# Patient Record
Sex: MALE | Race: WHITE | NOT HISPANIC OR LATINO | Employment: OTHER | URBAN - METROPOLITAN AREA
[De-identification: names, ages, dates, MRNs, and addresses within clinical notes are randomized per-mention and may not be internally consistent; named-entity substitution may affect disease eponyms.]

---

## 2019-11-22 ENCOUNTER — CATARACT EVALUATION (OUTPATIENT)
Dept: URBAN - METROPOLITAN AREA CLINIC 39 | Facility: CLINIC | Age: 74
End: 2019-11-22

## 2019-11-22 DIAGNOSIS — H25.811: ICD-10-CM

## 2019-11-22 DIAGNOSIS — H02.884: ICD-10-CM

## 2019-11-22 DIAGNOSIS — H43.21: ICD-10-CM

## 2019-11-22 DIAGNOSIS — H25.812: ICD-10-CM

## 2019-11-22 DIAGNOSIS — H04.123: ICD-10-CM

## 2019-11-22 DIAGNOSIS — H02.881: ICD-10-CM

## 2019-11-22 PROCEDURE — 99204 OFFICE O/P NEW MOD 45 MIN: CPT

## 2019-11-22 PROCEDURE — 92025-3 CORNEAL TOPO, REFUSED

## 2019-11-22 PROCEDURE — 92134 CPTRZ OPH DX IMG PST SGM RTA: CPT

## 2019-11-22 PROCEDURE — 92136TC INTERFEROMETRY - TECHNICAL COMPONENT

## 2019-11-22 RX ORDER — KETOROLAC TROMETHAMINE 5 MG/ML: 1 SOLUTION OPHTHALMIC

## 2019-11-22 RX ORDER — MOXIFLOXACIN HYDROCHLORIDE 5 MG/ML: 1 SOLUTION/ DROPS OPHTHALMIC

## 2019-11-22 RX ORDER — PREDNISOLONE ACETATE 10 MG/ML: 1 SUSPENSION/ DROPS OPHTHALMIC

## 2019-11-22 ASSESSMENT — KERATOMETRY
OD_AXISANGLE2_DEGREES: 11
OD_K1POWER_DIOPTERS: 45.25
OD_K2POWER_DIOPTERS: 46
OS_K1POWER_DIOPTERS: 44.25
OS_AXISANGLE2_DEGREES: 166
OS_AXISANGLE_DEGREES: 76
OS_K2POWER_DIOPTERS: 45.75
OD_AXISANGLE_DEGREES: 101

## 2019-11-22 ASSESSMENT — VISUAL ACUITY
OS_BAT: 20/400
OU_CC: 20/25-1
OU_SC: J16
OU_CC: J1
OS_SC: 20/80-1
OS_SC: J16
OS_CC: J8
OD_CC: 20/30-1
OD_BAT: 20/70
OD_SC: J16
OD_SC: 20/200-1
OU_SC: 20/80+1
OS_CC: 20/80-1
OD_CC: J2

## 2019-11-22 ASSESSMENT — TONOMETRY
OS_IOP_MMHG: 18
OD_IOP_MMHG: 18

## 2020-01-28 ENCOUNTER — SURGERY/PROCEDURE (OUTPATIENT)
Dept: URBAN - METROPOLITAN AREA CLINIC 39 | Facility: CLINIC | Age: 75
End: 2020-01-28

## 2020-01-28 ENCOUNTER — PRE-OP/H&P (OUTPATIENT)
Dept: URBAN - METROPOLITAN AREA CLINIC 39 | Facility: CLINIC | Age: 75
End: 2020-01-28

## 2020-01-28 DIAGNOSIS — H04.123: ICD-10-CM

## 2020-01-28 DIAGNOSIS — H25.811: ICD-10-CM

## 2020-01-28 DIAGNOSIS — H25.812: ICD-10-CM

## 2020-01-28 DIAGNOSIS — H02.881: ICD-10-CM

## 2020-01-28 DIAGNOSIS — H02.884: ICD-10-CM

## 2020-01-28 DIAGNOSIS — H21.81: ICD-10-CM

## 2020-01-28 DIAGNOSIS — H57.03: ICD-10-CM

## 2020-01-28 DIAGNOSIS — H43.21: ICD-10-CM

## 2020-01-28 PROCEDURE — 66982 XCAPSL CTRC RMVL CPLX WO ECP: CPT

## 2020-01-28 PROCEDURE — 99211T TECH SERVICE

## 2020-01-28 ASSESSMENT — KERATOMETRY
OD_AXISANGLE2_DEGREES: 11
OD_AXISANGLE_DEGREES: 101
OS_K2POWER_DIOPTERS: 45.75
OD_AXISANGLE2_DEGREES: 11
OS_AXISANGLE2_DEGREES: 166
OD_K1POWER_DIOPTERS: 45.25
OS_AXISANGLE2_DEGREES: 166
OD_AXISANGLE_DEGREES: 101
OD_K2POWER_DIOPTERS: 46
OS_K1POWER_DIOPTERS: 44.25
OS_AXISANGLE_DEGREES: 76
OS_K2POWER_DIOPTERS: 45.75
OS_K1POWER_DIOPTERS: 44.25
OD_K1POWER_DIOPTERS: 45.25
OS_AXISANGLE_DEGREES: 76
OD_K2POWER_DIOPTERS: 46

## 2020-01-29 ENCOUNTER — CATARACT POST-OP 1-DAY (OUTPATIENT)
Dept: URBAN - METROPOLITAN AREA CLINIC 35 | Facility: CLINIC | Age: 75
End: 2020-01-29

## 2020-01-29 DIAGNOSIS — Z96.1: ICD-10-CM

## 2020-01-29 PROCEDURE — 99024 POSTOP FOLLOW-UP VISIT: CPT

## 2020-01-29 ASSESSMENT — KERATOMETRY
OS_AXISANGLE_DEGREES: 76
OD_K1POWER_DIOPTERS: 45.25
OS_K2POWER_DIOPTERS: 45.75
OD_AXISANGLE2_DEGREES: 11
OD_K2POWER_DIOPTERS: 46
OD_AXISANGLE_DEGREES: 101
OS_AXISANGLE2_DEGREES: 166
OS_K1POWER_DIOPTERS: 44.25

## 2020-01-29 ASSESSMENT — VISUAL ACUITY
OD_SC: 20/200
OS_SC: J10
OS_SC: 20/40-2

## 2020-01-29 ASSESSMENT — TONOMETRY
OS_IOP_MMHG: 19
OD_IOP_MMHG: 18

## 2020-02-13 ENCOUNTER — POST-OP CATARACT (OUTPATIENT)
Dept: URBAN - METROPOLITAN AREA CLINIC 35 | Facility: CLINIC | Age: 75
End: 2020-02-13

## 2020-02-13 DIAGNOSIS — Z96.1: ICD-10-CM

## 2020-02-13 PROCEDURE — 99024 POSTOP FOLLOW-UP VISIT: CPT

## 2020-02-13 ASSESSMENT — VISUAL ACUITY
OD_SC: 20/100
OS_SC: 20/50

## 2020-02-13 ASSESSMENT — TONOMETRY
OS_IOP_MMHG: 12
OD_IOP_MMHG: 18

## 2024-01-01 ENCOUNTER — HOSPITAL ENCOUNTER (EMERGENCY)
Age: 79
Discharge: ANOTHER HOSPITAL | DRG: 288 | End: 2024-12-11
Attending: EMERGENCY MEDICINE
Payer: MEDICARE

## 2024-01-01 ENCOUNTER — HOSPITAL ENCOUNTER (INPATIENT)
Age: 79
LOS: 6 days | Discharge: HOSPICE | DRG: 288 | End: 2024-12-17
Attending: INTERNAL MEDICINE | Admitting: HOSPITALIST
Payer: MEDICARE

## 2024-01-01 ENCOUNTER — APPOINTMENT (OUTPATIENT)
Dept: GENERAL RADIOLOGY | Age: 79
DRG: 288 | End: 2024-01-01
Attending: INTERNAL MEDICINE
Payer: MEDICARE

## 2024-01-01 ENCOUNTER — APPOINTMENT (OUTPATIENT)
Dept: GENERAL RADIOLOGY | Age: 79
DRG: 288 | End: 2024-01-01
Payer: MEDICARE

## 2024-01-01 VITALS
WEIGHT: 156 LBS | OXYGEN SATURATION: 89 % | BODY MASS INDEX: 23.64 KG/M2 | SYSTOLIC BLOOD PRESSURE: 98 MMHG | HEART RATE: 120 BPM | HEIGHT: 68 IN | TEMPERATURE: 97.9 F | DIASTOLIC BLOOD PRESSURE: 47 MMHG | RESPIRATION RATE: 19 BRPM

## 2024-01-01 VITALS
HEIGHT: 68 IN | BODY MASS INDEX: 23.64 KG/M2 | HEART RATE: 96 BPM | WEIGHT: 156 LBS | DIASTOLIC BLOOD PRESSURE: 40 MMHG | OXYGEN SATURATION: 98 % | TEMPERATURE: 97.7 F | SYSTOLIC BLOOD PRESSURE: 132 MMHG | RESPIRATION RATE: 22 BRPM

## 2024-01-01 DIAGNOSIS — I33.0 INFECTIVE ENDOCARDITIS OF AORTIC VALVE: Primary | ICD-10-CM

## 2024-01-01 DIAGNOSIS — J18.9 PNEUMONIA OF BOTH LUNGS DUE TO INFECTIOUS ORGANISM, UNSPECIFIED PART OF LUNG: Primary | ICD-10-CM

## 2024-01-01 LAB
ABO + RH BLD: NORMAL
ALBUMIN SERPL-MCNC: 2.1 G/DL (ref 3.2–4.6)
ALBUMIN SERPL-MCNC: 2.3 G/DL (ref 3.2–4.6)
ALBUMIN SERPL-MCNC: 3.2 G/DL (ref 3.2–4.6)
ALBUMIN/GLOB SERPL: 0.5 (ref 1–1.9)
ALBUMIN/GLOB SERPL: 0.5 (ref 1–1.9)
ALBUMIN/GLOB SERPL: 0.8 (ref 1–1.9)
ALP SERPL-CCNC: 100 U/L (ref 40–129)
ALP SERPL-CCNC: 113 U/L (ref 40–129)
ALP SERPL-CCNC: 113 U/L (ref 40–129)
ALT SERPL-CCNC: 16 U/L (ref 8–55)
ALT SERPL-CCNC: 16 U/L (ref 8–55)
ALT SERPL-CCNC: 19 U/L (ref 12–65)
ANION GAP SERPL CALC-SCNC: 12 MMOL/L (ref 7–16)
ANION GAP SERPL CALC-SCNC: 13 MMOL/L (ref 7–16)
ANION GAP SERPL CALC-SCNC: 17 MMOL/L (ref 7–16)
APPEARANCE UR: ABNORMAL
APTT PPP: 32.6 SEC (ref 23.3–37.4)
ARTERIAL PATENCY WRIST A: POSITIVE
AST SERPL-CCNC: 31 U/L (ref 15–37)
AST SERPL-CCNC: 33 U/L (ref 15–37)
AST SERPL-CCNC: 36 U/L (ref 15–37)
BACTERIA SPEC CULT: NORMAL
BACTERIA URNS QL MICRO: ABNORMAL /HPF
BASE DEFICIT BLD-SCNC: 4.1 MMOL/L
BASOPHILS # BLD: 0 K/UL (ref 0–0.2)
BASOPHILS # BLD: 0.1 K/UL (ref 0–0.2)
BASOPHILS NFR BLD: 0 % (ref 0–2)
BASOPHILS NFR BLD: 1 % (ref 0–2)
BDY SITE: ABNORMAL
BILIRUB DIRECT SERPL-MCNC: <0.2 MG/DL (ref 0–0.4)
BILIRUB SERPL-MCNC: 0.2 MG/DL (ref 0–1.2)
BILIRUB SERPL-MCNC: 0.3 MG/DL (ref 0–1.2)
BILIRUB SERPL-MCNC: <0.2 MG/DL (ref 0–1.2)
BILIRUB UR QL: NEGATIVE
BLOOD GROUP ANTIBODIES SERPL: NORMAL
BUN SERPL-MCNC: 13 MG/DL (ref 8–23)
BUN SERPL-MCNC: 13 MG/DL (ref 8–23)
BUN SERPL-MCNC: 18 MG/DL (ref 8–23)
CALCIUM SERPL-MCNC: 8.4 MG/DL (ref 8.8–10.2)
CALCIUM SERPL-MCNC: 8.6 MG/DL (ref 8.8–10.2)
CALCIUM SERPL-MCNC: 8.8 MG/DL (ref 8.8–10.2)
CHLORIDE SERPL-SCNC: 103 MMOL/L (ref 98–107)
CHLORIDE SERPL-SCNC: 104 MMOL/L (ref 98–107)
CHLORIDE SERPL-SCNC: 105 MMOL/L (ref 98–107)
CO2 SERPL-SCNC: 16 MMOL/L (ref 20–29)
CO2 SERPL-SCNC: 20 MMOL/L (ref 20–29)
CO2 SERPL-SCNC: 22 MMOL/L (ref 20–29)
COLOR UR: ABNORMAL
CREAT SERPL-MCNC: 0.58 MG/DL (ref 0.8–1.3)
CREAT SERPL-MCNC: 0.64 MG/DL (ref 0.8–1.3)
CREAT SERPL-MCNC: 0.9 MG/DL (ref 0.8–1.3)
D DIMER PPP FEU-MCNC: 1.11 UG/ML(FEU)
DIFFERENTIAL METHOD BLD: ABNORMAL
DIFFERENTIAL METHOD BLD: ABNORMAL
EKG ATRIAL RATE: 115 BPM
EKG ATRIAL RATE: 94 BPM
EKG ATRIAL RATE: 95 BPM
EKG DIAGNOSIS: NORMAL
EKG P AXIS: 72 DEGREES
EKG P AXIS: 77 DEGREES
EKG P-R INTERVAL: 168 MS
EKG P-R INTERVAL: 175 MS
EKG Q-T INTERVAL: 316 MS
EKG Q-T INTERVAL: 384 MS
EKG Q-T INTERVAL: 410 MS
EKG QRS DURATION: 103 MS
EKG QRS DURATION: 111 MS
EKG QRS DURATION: 85 MS
EKG QTC CALCULATION (BAZETT): 447 MS
EKG QTC CALCULATION (BAZETT): 481 MS
EKG QTC CALCULATION (BAZETT): 519 MS
EKG R AXIS: 67 DEGREES
EKG R AXIS: 72 DEGREES
EKG R AXIS: 86 DEGREES
EKG T AXIS: 73 DEGREES
EKG T AXIS: 84 DEGREES
EKG T AXIS: 87 DEGREES
EKG VENTRICULAR RATE: 120 BPM
EKG VENTRICULAR RATE: 94 BPM
EKG VENTRICULAR RATE: 96 BPM
EOSINOPHIL # BLD: 0 K/UL (ref 0–0.8)
EOSINOPHIL # BLD: 0.3 K/UL (ref 0–0.8)
EOSINOPHIL NFR BLD: 0 % (ref 0.5–7.8)
EOSINOPHIL NFR BLD: 4 % (ref 0.5–7.8)
EPI CELLS #/AREA URNS HPF: ABNORMAL /HPF
ERYTHROCYTE [DISTWIDTH] IN BLOOD BY AUTOMATED COUNT: 20.3 % (ref 11.9–14.6)
ERYTHROCYTE [DISTWIDTH] IN BLOOD BY AUTOMATED COUNT: 20.6 % (ref 11.9–14.6)
ERYTHROCYTE [DISTWIDTH] IN BLOOD BY AUTOMATED COUNT: 20.9 % (ref 11.9–14.6)
GAS FLOW.O2 O2 DELIVERY SYS: ABNORMAL
GLOBULIN SER CALC-MCNC: 3.9 G/DL (ref 2.3–3.5)
GLOBULIN SER CALC-MCNC: 4.3 G/DL (ref 2.3–3.5)
GLOBULIN SER CALC-MCNC: 4.9 G/DL (ref 2.3–3.5)
GLUCOSE BLD STRIP.AUTO-MCNC: 180 MG/DL (ref 65–100)
GLUCOSE SERPL-MCNC: 107 MG/DL (ref 65–100)
GLUCOSE SERPL-MCNC: 139 MG/DL (ref 70–99)
GLUCOSE SERPL-MCNC: 235 MG/DL (ref 70–99)
GLUCOSE UR STRIP.AUTO-MCNC: NEGATIVE MG/DL
HCO3 BLD-SCNC: 19.5 MMOL/L (ref 22–26)
HCT VFR BLD AUTO: 29.1 % (ref 41.1–50.3)
HCT VFR BLD AUTO: 34.4 % (ref 41.1–50.3)
HCT VFR BLD AUTO: 34.7 % (ref 41.1–50.3)
HGB BLD-MCNC: 10.7 G/DL (ref 13.6–17.2)
HGB BLD-MCNC: 11 G/DL (ref 13.6–17.2)
HGB BLD-MCNC: 9.1 G/DL (ref 13.6–17.2)
HGB UR QL STRIP: ABNORMAL
IMM GRANULOCYTES # BLD AUTO: 0.1 K/UL (ref 0–0.5)
IMM GRANULOCYTES # BLD AUTO: 0.2 K/UL (ref 0–0.5)
IMM GRANULOCYTES NFR BLD AUTO: 1 % (ref 0–5)
IMM GRANULOCYTES NFR BLD AUTO: 1 % (ref 0–5)
INR PPP: 1.1
KETONES UR QL STRIP.AUTO: 15 MG/DL
LACTATE SERPL-SCNC: 1.6 MMOL/L (ref 0.5–2)
LACTATE SERPL-SCNC: 3.8 MMOL/L (ref 0.5–2)
LEUKOCYTE ESTERASE UR QL STRIP.AUTO: NEGATIVE
LYMPHOCYTES # BLD: 1.4 K/UL (ref 0.5–4.6)
LYMPHOCYTES # BLD: 2.1 K/UL (ref 0.5–4.6)
LYMPHOCYTES NFR BLD: 11 % (ref 13–44)
LYMPHOCYTES NFR BLD: 14 % (ref 13–44)
MAGNESIUM SERPL-MCNC: 1.9 MG/DL (ref 1.8–2.4)
MCH RBC QN AUTO: 27.6 PG (ref 26.1–32.9)
MCH RBC QN AUTO: 27.7 PG (ref 26.1–32.9)
MCH RBC QN AUTO: 28.5 PG (ref 26.1–32.9)
MCHC RBC AUTO-ENTMCNC: 30.8 G/DL (ref 31.4–35)
MCHC RBC AUTO-ENTMCNC: 31.3 G/DL (ref 31.4–35)
MCHC RBC AUTO-ENTMCNC: 32 G/DL (ref 31.4–35)
MCV RBC AUTO: 88.7 FL (ref 82–102)
MCV RBC AUTO: 89.1 FL (ref 82–102)
MCV RBC AUTO: 89.4 FL (ref 82–102)
MONOCYTES # BLD: 0.7 K/UL (ref 0.1–1.3)
MONOCYTES # BLD: 0.7 K/UL (ref 0.1–1.3)
MONOCYTES NFR BLD: 4 % (ref 4–12)
MONOCYTES NFR BLD: 7 % (ref 4–12)
MUCOUS THREADS URNS QL MICRO: 0 /LPF
NEUTS SEG # BLD: 16.3 K/UL (ref 1.7–8.2)
NEUTS SEG # BLD: 6.9 K/UL (ref 1.7–8.2)
NEUTS SEG NFR BLD: 73 % (ref 43–78)
NEUTS SEG NFR BLD: 84 % (ref 43–78)
NITRITE UR QL STRIP.AUTO: NEGATIVE
NRBC # BLD: 0 K/UL (ref 0–0.2)
NT PRO BNP: 6388 PG/ML (ref 0–450)
NT PRO BNP: 6875 PG/ML (ref 0–450)
OTHER OBSERVATIONS: ABNORMAL
PCO2 BLD: 30.5 MMHG (ref 35–45)
PH BLD: 7.41 (ref 7.35–7.45)
PH UR STRIP: 5.5 (ref 5–9)
PLATELET # BLD AUTO: 350 K/UL (ref 150–450)
PLATELET # BLD AUTO: 388 K/UL (ref 150–450)
PLATELET # BLD AUTO: 570 K/UL (ref 150–450)
PMV BLD AUTO: 7.9 FL (ref 9.4–12.3)
PMV BLD AUTO: 8.2 FL (ref 9.4–12.3)
PMV BLD AUTO: 8.3 FL (ref 9.4–12.3)
PO2 BLD: 57 MMHG (ref 75–100)
POC FIO2: 6
POTASSIUM SERPL-SCNC: 3.4 MMOL/L (ref 3.5–5.1)
POTASSIUM SERPL-SCNC: 3.7 MMOL/L (ref 3.5–5.1)
POTASSIUM SERPL-SCNC: 4.2 MMOL/L (ref 3.5–5.1)
PROCALCITONIN SERPL-MCNC: 0.11 NG/ML (ref 0–0.1)
PROCALCITONIN SERPL-MCNC: 0.13 NG/ML (ref 0–0.49)
PROT SERPL-MCNC: 6.5 G/DL (ref 6.3–8.2)
PROT SERPL-MCNC: 7.1 G/DL (ref 6.3–8.2)
PROT SERPL-MCNC: 7.3 G/DL (ref 6.3–8.2)
PROT UR STRIP-MCNC: 100 MG/DL
PROTHROMBIN TIME: 14.4 SEC (ref 11.3–14.9)
RBC # BLD AUTO: 3.28 M/UL (ref 4.23–5.6)
RBC # BLD AUTO: 3.86 M/UL (ref 4.23–5.6)
RBC # BLD AUTO: 3.88 M/UL (ref 4.23–5.6)
RBC #/AREA URNS HPF: ABNORMAL /HPF
SAO2 % BLD: 90.1 % (ref 95–98)
SERVICE CMNT-IMP: ABNORMAL
SERVICE CMNT-IMP: ABNORMAL
SERVICE CMNT-IMP: NORMAL
SODIUM SERPL-SCNC: 135 MMOL/L (ref 136–145)
SODIUM SERPL-SCNC: 137 MMOL/L (ref 136–145)
SODIUM SERPL-SCNC: 139 MMOL/L (ref 133–143)
SP GR UR REFRACTOMETRY: 1.02 (ref 1–1.02)
SPECIMEN EXP DATE BLD: NORMAL
SPECIMEN TYPE: ABNORMAL
TROPONIN T SERPL HS-MCNC: 64.2 NG/L (ref 0–22)
TROPONIN T SERPL HS-MCNC: 68.2 NG/L (ref 0–22)
TROPONIN T SERPL HS-MCNC: 71 NG/L (ref 0–22)
TROPONIN T SERPL HS-MCNC: 82 NG/L (ref 0–22)
UFH PPP CHRO-ACNC: 0.1 IU/ML (ref 0.3–0.7)
UROBILINOGEN UR QL STRIP.AUTO: 0.2 EU/DL (ref 0.2–1)
WBC # BLD AUTO: 19.3 K/UL (ref 4.3–11.1)
WBC # BLD AUTO: 9.3 K/UL (ref 4.3–11.1)
WBC # BLD AUTO: 9.4 K/UL (ref 4.3–11.1)
WBC URNS QL MICRO: ABNORMAL /HPF

## 2024-01-01 PROCEDURE — 6360000002 HC RX W HCPCS: Performed by: PHYSICIAN ASSISTANT

## 2024-01-01 PROCEDURE — 2580000003 HC RX 258: Performed by: PHYSICIAN ASSISTANT

## 2024-01-01 PROCEDURE — 2580000003 HC RX 258: Performed by: HOSPITALIST

## 2024-01-01 PROCEDURE — 93010 ELECTROCARDIOGRAM REPORT: CPT | Performed by: INTERNAL MEDICINE

## 2024-01-01 PROCEDURE — 84145 PROCALCITONIN (PCT): CPT

## 2024-01-01 PROCEDURE — 1100000003 HC PRIVATE W/ TELEMETRY

## 2024-01-01 PROCEDURE — 2500000003 HC RX 250 WO HCPCS: Performed by: STUDENT IN AN ORGANIZED HEALTH CARE EDUCATION/TRAINING PROGRAM

## 2024-01-01 PROCEDURE — 6370000000 HC RX 637 (ALT 250 FOR IP): Performed by: HOSPITALIST

## 2024-01-01 PROCEDURE — 85730 THROMBOPLASTIN TIME PARTIAL: CPT

## 2024-01-01 PROCEDURE — 83605 ASSAY OF LACTIC ACID: CPT

## 2024-01-01 PROCEDURE — 2580000003 HC RX 258: Performed by: INTERNAL MEDICINE

## 2024-01-01 PROCEDURE — 82803 BLOOD GASES ANY COMBINATION: CPT

## 2024-01-01 PROCEDURE — 94760 N-INVAS EAR/PLS OXIMETRY 1: CPT

## 2024-01-01 PROCEDURE — 85520 HEPARIN ASSAY: CPT

## 2024-01-01 PROCEDURE — 85027 COMPLETE CBC AUTOMATED: CPT

## 2024-01-01 PROCEDURE — 94660 CPAP INITIATION&MGMT: CPT

## 2024-01-01 PROCEDURE — 80053 COMPREHEN METABOLIC PANEL: CPT

## 2024-01-01 PROCEDURE — 6360000002 HC RX W HCPCS: Performed by: INTERNAL MEDICINE

## 2024-01-01 PROCEDURE — 86850 RBC ANTIBODY SCREEN: CPT

## 2024-01-01 PROCEDURE — 86901 BLOOD TYPING SEROLOGIC RH(D): CPT

## 2024-01-01 PROCEDURE — 84484 ASSAY OF TROPONIN QUANT: CPT

## 2024-01-01 PROCEDURE — 86900 BLOOD TYPING SEROLOGIC ABO: CPT

## 2024-01-01 PROCEDURE — 6370000000 HC RX 637 (ALT 250 FOR IP): Performed by: FAMILY MEDICINE

## 2024-01-01 PROCEDURE — 1100000000 HC RM PRIVATE

## 2024-01-01 PROCEDURE — 96366 THER/PROPH/DIAG IV INF ADDON: CPT

## 2024-01-01 PROCEDURE — 2700000000 HC OXYGEN THERAPY PER DAY

## 2024-01-01 PROCEDURE — 99223 1ST HOSP IP/OBS HIGH 75: CPT | Performed by: INTERNAL MEDICINE

## 2024-01-01 PROCEDURE — 85379 FIBRIN DEGRADATION QUANT: CPT

## 2024-01-01 PROCEDURE — 36415 COLL VENOUS BLD VENIPUNCTURE: CPT

## 2024-01-01 PROCEDURE — 81001 URINALYSIS AUTO W/SCOPE: CPT

## 2024-01-01 PROCEDURE — 71045 X-RAY EXAM CHEST 1 VIEW: CPT

## 2024-01-01 PROCEDURE — 83880 ASSAY OF NATRIURETIC PEPTIDE: CPT

## 2024-01-01 PROCEDURE — 6370000000 HC RX 637 (ALT 250 FOR IP): Performed by: STUDENT IN AN ORGANIZED HEALTH CARE EDUCATION/TRAINING PROGRAM

## 2024-01-01 PROCEDURE — 6360000002 HC RX W HCPCS: Performed by: HOSPITALIST

## 2024-01-01 PROCEDURE — 6360000002 HC RX W HCPCS: Performed by: STUDENT IN AN ORGANIZED HEALTH CARE EDUCATION/TRAINING PROGRAM

## 2024-01-01 PROCEDURE — 85025 COMPLETE CBC W/AUTO DIFF WBC: CPT

## 2024-01-01 PROCEDURE — 6360000002 HC RX W HCPCS: Performed by: EMERGENCY MEDICINE

## 2024-01-01 PROCEDURE — 6370000000 HC RX 637 (ALT 250 FOR IP)

## 2024-01-01 PROCEDURE — 99222 1ST HOSP IP/OBS MODERATE 55: CPT | Performed by: INTERNAL MEDICINE

## 2024-01-01 PROCEDURE — 94640 AIRWAY INHALATION TREATMENT: CPT

## 2024-01-01 PROCEDURE — 93005 ELECTROCARDIOGRAM TRACING: CPT | Performed by: STUDENT IN AN ORGANIZED HEALTH CARE EDUCATION/TRAINING PROGRAM

## 2024-01-01 PROCEDURE — 99285 EMERGENCY DEPT VISIT HI MDM: CPT

## 2024-01-01 PROCEDURE — 87040 BLOOD CULTURE FOR BACTERIA: CPT

## 2024-01-01 PROCEDURE — 2500000003 HC RX 250 WO HCPCS: Performed by: HOSPITALIST

## 2024-01-01 PROCEDURE — 96375 TX/PRO/DX INJ NEW DRUG ADDON: CPT

## 2024-01-01 PROCEDURE — 5A09357 ASSISTANCE WITH RESPIRATORY VENTILATION, LESS THAN 24 CONSECUTIVE HOURS, CONTINUOUS POSITIVE AIRWAY PRESSURE: ICD-10-PCS

## 2024-01-01 PROCEDURE — 2400000000

## 2024-01-01 PROCEDURE — 6370000000 HC RX 637 (ALT 250 FOR IP): Performed by: PHYSICIAN ASSISTANT

## 2024-01-01 PROCEDURE — 93005 ELECTROCARDIOGRAM TRACING: CPT | Performed by: HOSPITALIST

## 2024-01-01 PROCEDURE — 85610 PROTHROMBIN TIME: CPT

## 2024-01-01 PROCEDURE — 82962 GLUCOSE BLOOD TEST: CPT

## 2024-01-01 PROCEDURE — 93005 ELECTROCARDIOGRAM TRACING: CPT | Performed by: EMERGENCY MEDICINE

## 2024-01-01 PROCEDURE — 96365 THER/PROPH/DIAG IV INF INIT: CPT

## 2024-01-01 PROCEDURE — 82248 BILIRUBIN DIRECT: CPT

## 2024-01-01 PROCEDURE — 83735 ASSAY OF MAGNESIUM: CPT

## 2024-01-01 PROCEDURE — 36600 WITHDRAWAL OF ARTERIAL BLOOD: CPT

## 2024-01-01 RX ORDER — IPRATROPIUM BROMIDE AND ALBUTEROL SULFATE 2.5; .5 MG/3ML; MG/3ML
1 SOLUTION RESPIRATORY (INHALATION) EVERY 4 HOURS PRN
Status: DISCONTINUED | OUTPATIENT
Start: 2024-01-01 | End: 2024-01-01 | Stop reason: HOSPADM

## 2024-01-01 RX ORDER — HEPARIN SODIUM 1000 [USP'U]/ML
40 INJECTION, SOLUTION INTRAVENOUS; SUBCUTANEOUS PRN
Status: DISCONTINUED | OUTPATIENT
Start: 2024-01-01 | End: 2024-01-01

## 2024-01-01 RX ORDER — ACETAMINOPHEN 325 MG/1
650 TABLET ORAL EVERY 6 HOURS PRN
Status: DISCONTINUED | OUTPATIENT
Start: 2024-01-01 | End: 2024-01-01 | Stop reason: HOSPADM

## 2024-01-01 RX ORDER — HEPARIN SODIUM 1000 [USP'U]/ML
80 INJECTION, SOLUTION INTRAVENOUS; SUBCUTANEOUS PRN
Status: DISCONTINUED | OUTPATIENT
Start: 2024-01-01 | End: 2024-01-01

## 2024-01-01 RX ORDER — ASPIRIN 325 MG
325 TABLET ORAL ONCE
Status: COMPLETED | OUTPATIENT
Start: 2024-01-01 | End: 2024-01-01

## 2024-01-01 RX ORDER — MORPHINE SULFATE 4 MG/ML
4 INJECTION, SOLUTION INTRAMUSCULAR; INTRAVENOUS
Status: DISCONTINUED | OUTPATIENT
Start: 2024-01-01 | End: 2024-01-01

## 2024-01-01 RX ORDER — HEPARIN SODIUM 1000 [USP'U]/ML
2000 INJECTION, SOLUTION INTRAVENOUS; SUBCUTANEOUS PRN
Status: DISCONTINUED | OUTPATIENT
Start: 2024-01-01 | End: 2024-01-01

## 2024-01-01 RX ORDER — GUAIFENESIN/DEXTROMETHORPHAN 100-10MG/5
5 SYRUP ORAL EVERY 4 HOURS PRN
Status: DISCONTINUED | OUTPATIENT
Start: 2024-01-01 | End: 2024-01-01 | Stop reason: HOSPADM

## 2024-01-01 RX ORDER — LEVOFLOXACIN 5 MG/ML
750 INJECTION, SOLUTION INTRAVENOUS
Status: COMPLETED | OUTPATIENT
Start: 2024-01-01 | End: 2024-01-01

## 2024-01-01 RX ORDER — SODIUM CHLORIDE 0.9 % (FLUSH) 0.9 %
5-40 SYRINGE (ML) INJECTION EVERY 12 HOURS SCHEDULED
Status: DISCONTINUED | OUTPATIENT
Start: 2024-01-01 | End: 2024-01-01 | Stop reason: HOSPADM

## 2024-01-01 RX ORDER — MORPHINE SULFATE 2 MG/ML
2 INJECTION, SOLUTION INTRAMUSCULAR; INTRAVENOUS ONCE
Status: COMPLETED | OUTPATIENT
Start: 2024-01-01 | End: 2024-01-01

## 2024-01-01 RX ORDER — SODIUM CHLORIDE 0.9 % (FLUSH) 0.9 %
5-40 SYRINGE (ML) INJECTION PRN
Status: DISCONTINUED | OUTPATIENT
Start: 2024-01-01 | End: 2024-01-01 | Stop reason: HOSPADM

## 2024-01-01 RX ORDER — FUROSEMIDE 10 MG/ML
40 INJECTION INTRAMUSCULAR; INTRAVENOUS ONCE
Status: COMPLETED | OUTPATIENT
Start: 2024-01-01 | End: 2024-01-01

## 2024-01-01 RX ORDER — HEPARIN SODIUM 1000 [USP'U]/ML
80 INJECTION, SOLUTION INTRAVENOUS; SUBCUTANEOUS ONCE
Status: DISCONTINUED | OUTPATIENT
Start: 2024-01-01 | End: 2024-01-01

## 2024-01-01 RX ORDER — METOPROLOL TARTRATE 1 MG/ML
5 INJECTION, SOLUTION INTRAVENOUS ONCE
Status: COMPLETED | OUTPATIENT
Start: 2024-01-01 | End: 2024-01-01

## 2024-01-01 RX ORDER — POLYETHYLENE GLYCOL 3350 17 G/17G
17 POWDER, FOR SOLUTION ORAL DAILY PRN
Status: DISCONTINUED | OUTPATIENT
Start: 2024-01-01 | End: 2024-01-01 | Stop reason: HOSPADM

## 2024-01-01 RX ORDER — ASPIRIN 81 MG/1
81 TABLET, CHEWABLE ORAL DAILY
Status: DISCONTINUED | OUTPATIENT
Start: 2024-01-01 | End: 2024-01-01

## 2024-01-01 RX ORDER — ENOXAPARIN SODIUM 100 MG/ML
40 INJECTION SUBCUTANEOUS DAILY
Status: DISCONTINUED | OUTPATIENT
Start: 2024-01-01 | End: 2024-01-01

## 2024-01-01 RX ORDER — METOPROLOL TARTRATE 1 MG/ML
INJECTION, SOLUTION INTRAVENOUS
Status: DISPENSED
Start: 2024-01-01 | End: 2024-01-01

## 2024-01-01 RX ORDER — MORPHINE SULFATE 4 MG/ML
4 INJECTION, SOLUTION INTRAMUSCULAR; INTRAVENOUS
Status: DISCONTINUED | OUTPATIENT
Start: 2024-01-01 | End: 2024-01-01 | Stop reason: HOSPADM

## 2024-01-01 RX ORDER — NITROGLYCERIN 0.4 MG/1
0.4 TABLET SUBLINGUAL EVERY 5 MIN PRN
Status: DISCONTINUED | OUTPATIENT
Start: 2024-01-01 | End: 2024-01-01 | Stop reason: HOSPADM

## 2024-01-01 RX ORDER — IPRATROPIUM BROMIDE AND ALBUTEROL SULFATE 2.5; .5 MG/3ML; MG/3ML
1 SOLUTION RESPIRATORY (INHALATION)
Status: DISCONTINUED | OUTPATIENT
Start: 2024-01-01 | End: 2024-01-01 | Stop reason: SDUPTHER

## 2024-01-01 RX ORDER — SODIUM CHLORIDE 9 MG/ML
INJECTION, SOLUTION INTRAVENOUS PRN
Status: DISCONTINUED | OUTPATIENT
Start: 2024-01-01 | End: 2024-01-01 | Stop reason: HOSPADM

## 2024-01-01 RX ORDER — PANTOPRAZOLE SODIUM 40 MG/1
40 TABLET, DELAYED RELEASE ORAL
Status: DISCONTINUED | OUTPATIENT
Start: 2024-01-01 | End: 2024-01-01

## 2024-01-01 RX ORDER — HEPARIN SODIUM 10000 [USP'U]/100ML
5-30 INJECTION, SOLUTION INTRAVENOUS CONTINUOUS
Status: DISCONTINUED | OUTPATIENT
Start: 2024-01-01 | End: 2024-01-01

## 2024-01-01 RX ORDER — SCOPOLAMINE 1 MG/3D
1 PATCH, EXTENDED RELEASE TRANSDERMAL
Status: DISCONTINUED | OUTPATIENT
Start: 2024-01-01 | End: 2024-01-01 | Stop reason: HOSPADM

## 2024-01-01 RX ORDER — HEPARIN SODIUM 1000 [USP'U]/ML
4000 INJECTION, SOLUTION INTRAVENOUS; SUBCUTANEOUS PRN
Status: DISCONTINUED | OUTPATIENT
Start: 2024-01-01 | End: 2024-01-01

## 2024-01-01 RX ORDER — LACTOBACILLUS RHAMNOSUS GG 10B CELL
1 CAPSULE ORAL
Status: DISCONTINUED | OUTPATIENT
Start: 2024-01-01 | End: 2024-01-01

## 2024-01-01 RX ORDER — ONDANSETRON 4 MG/1
4 TABLET, ORALLY DISINTEGRATING ORAL EVERY 8 HOURS PRN
Status: DISCONTINUED | OUTPATIENT
Start: 2024-01-01 | End: 2024-01-01 | Stop reason: HOSPADM

## 2024-01-01 RX ORDER — ONDANSETRON 2 MG/ML
4 INJECTION INTRAMUSCULAR; INTRAVENOUS EVERY 6 HOURS PRN
Status: DISCONTINUED | OUTPATIENT
Start: 2024-01-01 | End: 2024-01-01 | Stop reason: HOSPADM

## 2024-01-01 RX ORDER — LORAZEPAM 1 MG/1
1 TABLET ORAL ONCE
Status: DISCONTINUED | OUTPATIENT
Start: 2024-01-01 | End: 2024-01-01

## 2024-01-01 RX ORDER — IPRATROPIUM BROMIDE AND ALBUTEROL SULFATE 2.5; .5 MG/3ML; MG/3ML
1 SOLUTION RESPIRATORY (INHALATION)
Status: DISCONTINUED | OUTPATIENT
Start: 2024-01-01 | End: 2024-01-01

## 2024-01-01 RX ORDER — DEXMEDETOMIDINE HYDROCHLORIDE 4 UG/ML
.1-1.5 INJECTION, SOLUTION INTRAVENOUS CONTINUOUS
Status: DISCONTINUED | OUTPATIENT
Start: 2024-01-01 | End: 2024-01-01

## 2024-01-01 RX ORDER — ATORVASTATIN CALCIUM 80 MG/1
80 TABLET, FILM COATED ORAL NIGHTLY
Status: DISCONTINUED | OUTPATIENT
Start: 2024-01-01 | End: 2024-01-01

## 2024-01-01 RX ORDER — NITROGLYCERIN 0.4 MG/1
TABLET SUBLINGUAL
Status: COMPLETED
Start: 2024-01-01 | End: 2024-01-01

## 2024-01-01 RX ORDER — MORPHINE SULFATE 4 MG/ML
4 INJECTION, SOLUTION INTRAMUSCULAR; INTRAVENOUS ONCE
Status: COMPLETED | OUTPATIENT
Start: 2024-01-01 | End: 2024-01-01

## 2024-01-01 RX ORDER — HEPARIN SODIUM 1000 [USP'U]/ML
4000 INJECTION, SOLUTION INTRAVENOUS; SUBCUTANEOUS ONCE
Status: COMPLETED | OUTPATIENT
Start: 2024-01-01 | End: 2024-01-01

## 2024-01-01 RX ORDER — HYDRALAZINE HYDROCHLORIDE 20 MG/ML
10 INJECTION INTRAMUSCULAR; INTRAVENOUS EVERY 6 HOURS PRN
Status: DISCONTINUED | OUTPATIENT
Start: 2024-01-01 | End: 2024-01-01 | Stop reason: HOSPADM

## 2024-01-01 RX ORDER — ACETAMINOPHEN 650 MG/1
650 SUPPOSITORY RECTAL EVERY 6 HOURS PRN
Status: DISCONTINUED | OUTPATIENT
Start: 2024-01-01 | End: 2024-01-01 | Stop reason: HOSPADM

## 2024-01-01 RX ORDER — NITROGLYCERIN 0.4 MG/1
TABLET SUBLINGUAL
Status: DISCONTINUED
Start: 2024-01-01 | End: 2024-01-01 | Stop reason: WASHOUT

## 2024-01-01 RX ADMIN — MORPHINE SULFATE 4 MG: 4 INJECTION INTRAVENOUS at 10:22

## 2024-01-01 RX ADMIN — CEFTRIAXONE SODIUM 2000 MG: 2 INJECTION, POWDER, FOR SOLUTION INTRAMUSCULAR; INTRAVENOUS at 14:51

## 2024-01-01 RX ADMIN — NITROGLYCERIN 0.4 MG: 0.4 TABLET SUBLINGUAL at 02:48

## 2024-01-01 RX ADMIN — SODIUM CHLORIDE, PRESERVATIVE FREE 1 MG: 5 INJECTION INTRAVENOUS at 19:31

## 2024-01-01 RX ADMIN — SODIUM CHLORIDE, PRESERVATIVE FREE 1 MG: 5 INJECTION INTRAVENOUS at 01:13

## 2024-01-01 RX ADMIN — SODIUM CHLORIDE, PRESERVATIVE FREE 10 ML: 5 INJECTION INTRAVENOUS at 09:02

## 2024-01-01 RX ADMIN — ENOXAPARIN SODIUM 40 MG: 100 INJECTION SUBCUTANEOUS at 08:48

## 2024-01-01 RX ADMIN — SODIUM CHLORIDE, PRESERVATIVE FREE 1 MG: 5 INJECTION INTRAVENOUS at 05:22

## 2024-01-01 RX ADMIN — VANCOMYCIN HYDROCHLORIDE 750 MG: 750 INJECTION, POWDER, LYOPHILIZED, FOR SOLUTION INTRAVENOUS at 11:48

## 2024-01-01 RX ADMIN — SODIUM CHLORIDE, PRESERVATIVE FREE 40 ML: 5 INJECTION INTRAVENOUS at 19:54

## 2024-01-01 RX ADMIN — ASPIRIN 325 MG: 325 TABLET, FILM COATED ORAL at 21:06

## 2024-01-01 RX ADMIN — MORPHINE SULFATE 4 MG: 4 INJECTION INTRAVENOUS at 01:15

## 2024-01-01 RX ADMIN — SODIUM CHLORIDE, PRESERVATIVE FREE 5 ML: 5 INJECTION INTRAVENOUS at 08:48

## 2024-01-01 RX ADMIN — SODIUM CHLORIDE, PRESERVATIVE FREE 1 MG: 5 INJECTION INTRAVENOUS at 16:28

## 2024-01-01 RX ADMIN — WATER 40 MG: 1 INJECTION INTRAMUSCULAR; INTRAVENOUS; SUBCUTANEOUS at 01:18

## 2024-01-01 RX ADMIN — MORPHINE SULFATE 4 MG: 4 INJECTION INTRAVENOUS at 22:50

## 2024-01-01 RX ADMIN — MORPHINE SULFATE 4 MG: 4 INJECTION INTRAVENOUS at 05:15

## 2024-01-01 RX ADMIN — MORPHINE SULFATE 4 MG: 4 INJECTION INTRAVENOUS at 17:15

## 2024-01-01 RX ADMIN — SODIUM CHLORIDE, PRESERVATIVE FREE 1 MG: 5 INJECTION INTRAVENOUS at 04:41

## 2024-01-01 RX ADMIN — MORPHINE SULFATE 4 MG: 4 INJECTION INTRAVENOUS at 09:37

## 2024-01-01 RX ADMIN — MORPHINE SULFATE 4 MG: 4 INJECTION INTRAVENOUS at 17:59

## 2024-01-01 RX ADMIN — FUROSEMIDE 40 MG: 10 INJECTION, SOLUTION INTRAMUSCULAR; INTRAVENOUS at 18:29

## 2024-01-01 RX ADMIN — ONDANSETRON 4 MG: 2 INJECTION INTRAMUSCULAR; INTRAVENOUS at 02:57

## 2024-01-01 RX ADMIN — SODIUM CHLORIDE, PRESERVATIVE FREE 1 MG: 5 INJECTION INTRAVENOUS at 05:15

## 2024-01-01 RX ADMIN — MORPHINE SULFATE 4 MG: 4 INJECTION INTRAVENOUS at 08:12

## 2024-01-01 RX ADMIN — SODIUM CHLORIDE, PRESERVATIVE FREE 1 MG: 5 INJECTION INTRAVENOUS at 19:57

## 2024-01-01 RX ADMIN — SODIUM CHLORIDE, PRESERVATIVE FREE 1 MG: 5 INJECTION INTRAVENOUS at 10:03

## 2024-01-01 RX ADMIN — WATER 40 MG: 1 INJECTION INTRAMUSCULAR; INTRAVENOUS; SUBCUTANEOUS at 00:46

## 2024-01-01 RX ADMIN — DEXMEDETOMIDINE HYDROCHLORIDE 0.2 MCG/KG/HR: 400 INJECTION INTRAVENOUS at 09:06

## 2024-01-01 RX ADMIN — MORPHINE SULFATE 4 MG: 4 INJECTION INTRAVENOUS at 19:37

## 2024-01-01 RX ADMIN — PIPERACILLIN AND TAZOBACTAM 4500 MG: 4; .5 INJECTION, POWDER, FOR SOLUTION INTRAVENOUS at 00:50

## 2024-01-01 RX ADMIN — SODIUM CHLORIDE, PRESERVATIVE FREE 1 MG: 5 INJECTION INTRAVENOUS at 02:27

## 2024-01-01 RX ADMIN — SODIUM CHLORIDE, PRESERVATIVE FREE 1 MG: 5 INJECTION INTRAVENOUS at 12:29

## 2024-01-01 RX ADMIN — CEFTRIAXONE SODIUM 2000 MG: 2 INJECTION, POWDER, FOR SOLUTION INTRAMUSCULAR; INTRAVENOUS at 01:13

## 2024-01-01 RX ADMIN — NITROGLYCERIN: 0.4 TABLET SUBLINGUAL at 18:56

## 2024-01-01 RX ADMIN — WATER 40 MG: 1 INJECTION INTRAMUSCULAR; INTRAVENOUS; SUBCUTANEOUS at 08:48

## 2024-01-01 RX ADMIN — SODIUM CHLORIDE, PRESERVATIVE FREE 1 MG: 5 INJECTION INTRAVENOUS at 23:51

## 2024-01-01 RX ADMIN — SODIUM CHLORIDE, PRESERVATIVE FREE 1 MG: 5 INJECTION INTRAVENOUS at 04:36

## 2024-01-01 RX ADMIN — IPRATROPIUM BROMIDE AND ALBUTEROL SULFATE 1 DOSE: 2.5; .5 SOLUTION RESPIRATORY (INHALATION) at 08:54

## 2024-01-01 RX ADMIN — SODIUM CHLORIDE, PRESERVATIVE FREE 1 MG: 5 INJECTION INTRAVENOUS at 11:07

## 2024-01-01 RX ADMIN — IPRATROPIUM BROMIDE AND ALBUTEROL SULFATE 1 DOSE: 2.5; .5 SOLUTION RESPIRATORY (INHALATION) at 07:33

## 2024-01-01 RX ADMIN — SODIUM CHLORIDE, PRESERVATIVE FREE 1 MG: 5 INJECTION INTRAVENOUS at 23:05

## 2024-01-01 RX ADMIN — PIPERACILLIN AND TAZOBACTAM 3375 MG: 3; .375 INJECTION, POWDER, LYOPHILIZED, FOR SOLUTION INTRAVENOUS at 05:54

## 2024-01-01 RX ADMIN — MORPHINE SULFATE 4 MG: 4 INJECTION INTRAVENOUS at 16:22

## 2024-01-01 RX ADMIN — MORPHINE SULFATE 4 MG: 4 INJECTION INTRAVENOUS at 08:06

## 2024-01-01 RX ADMIN — METOROPROLOL TARTRATE 5 MG: 5 INJECTION, SOLUTION INTRAVENOUS at 02:51

## 2024-01-01 RX ADMIN — LEVOFLOXACIN 750 MG: 750 INJECTION, SOLUTION INTRAVENOUS at 20:01

## 2024-01-01 RX ADMIN — SODIUM CHLORIDE, PRESERVATIVE FREE 10 ML: 5 INJECTION INTRAVENOUS at 21:06

## 2024-01-01 RX ADMIN — MORPHINE SULFATE 4 MG: 4 INJECTION INTRAVENOUS at 12:29

## 2024-01-01 RX ADMIN — WATER 40 MG: 1 INJECTION INTRAMUSCULAR; INTRAVENOUS; SUBCUTANEOUS at 18:29

## 2024-01-01 RX ADMIN — MORPHINE SULFATE 4 MG: 4 INJECTION INTRAVENOUS at 07:55

## 2024-01-01 RX ADMIN — ASPIRIN 81 MG: 81 TABLET, CHEWABLE ORAL at 18:29

## 2024-01-01 RX ADMIN — IPRATROPIUM BROMIDE AND ALBUTEROL SULFATE 1 DOSE: 2.5; .5 SOLUTION RESPIRATORY (INHALATION) at 11:23

## 2024-01-01 RX ADMIN — MORPHINE SULFATE 4 MG: 4 INJECTION INTRAVENOUS at 00:27

## 2024-01-01 RX ADMIN — SODIUM CHLORIDE 20 MILLION UNITS: 9 INJECTION, SOLUTION INTRAVENOUS at 15:50

## 2024-01-01 RX ADMIN — SODIUM CHLORIDE, PRESERVATIVE FREE 1 MG: 5 INJECTION INTRAVENOUS at 19:51

## 2024-01-01 RX ADMIN — SODIUM CHLORIDE, PRESERVATIVE FREE 1 MG: 5 INJECTION INTRAVENOUS at 14:24

## 2024-01-01 RX ADMIN — FUROSEMIDE 40 MG: 10 INJECTION, SOLUTION INTRAMUSCULAR; INTRAVENOUS at 21:34

## 2024-01-01 RX ADMIN — HEPARIN SODIUM 4000 UNITS: 1000 INJECTION INTRAVENOUS; SUBCUTANEOUS at 21:05

## 2024-01-01 RX ADMIN — SODIUM CHLORIDE 1 MG: 9 INJECTION INTRAMUSCULAR; INTRAVENOUS; SUBCUTANEOUS at 17:43

## 2024-01-01 RX ADMIN — MORPHINE SULFATE 4 MG: 4 INJECTION INTRAVENOUS at 11:57

## 2024-01-01 RX ADMIN — MORPHINE SULFATE 4 MG: 4 INJECTION INTRAVENOUS at 17:13

## 2024-01-01 RX ADMIN — HEPARIN SODIUM 12 UNITS/KG/HR: 10000 INJECTION, SOLUTION INTRAVENOUS at 21:05

## 2024-01-01 RX ADMIN — NITROGLYCERIN: 0.4 TABLET SUBLINGUAL at 18:59

## 2024-01-01 RX ADMIN — SODIUM CHLORIDE 1500 MG: 9 INJECTION, SOLUTION INTRAVENOUS at 00:55

## 2024-01-01 RX ADMIN — SODIUM CHLORIDE, PRESERVATIVE FREE 1 MG: 5 INJECTION INTRAVENOUS at 06:36

## 2024-01-01 RX ADMIN — SODIUM CHLORIDE, PRESERVATIVE FREE 10 ML: 5 INJECTION INTRAVENOUS at 08:09

## 2024-01-01 RX ADMIN — SODIUM CHLORIDE, PRESERVATIVE FREE 1 MG: 5 INJECTION INTRAVENOUS at 02:31

## 2024-01-01 RX ADMIN — MORPHINE SULFATE 4 MG: 4 INJECTION INTRAVENOUS at 08:42

## 2024-01-01 RX ADMIN — MORPHINE SULFATE 4 MG: 4 INJECTION INTRAVENOUS at 01:14

## 2024-01-01 RX ADMIN — MORPHINE SULFATE 4 MG: 4 INJECTION INTRAVENOUS at 14:27

## 2024-01-01 RX ADMIN — MORPHINE SULFATE 4 MG: 4 INJECTION INTRAVENOUS at 08:59

## 2024-01-01 RX ADMIN — SODIUM CHLORIDE, PRESERVATIVE FREE 1 MG: 5 INJECTION INTRAVENOUS at 12:16

## 2024-01-01 RX ADMIN — ATORVASTATIN CALCIUM 80 MG: 80 TABLET, FILM COATED ORAL at 21:06

## 2024-01-01 RX ADMIN — IPRATROPIUM BROMIDE AND ALBUTEROL SULFATE 1 DOSE: 2.5; .5 SOLUTION RESPIRATORY (INHALATION) at 19:52

## 2024-01-01 RX ADMIN — MORPHINE SULFATE 4 MG: 4 INJECTION INTRAVENOUS at 02:34

## 2024-01-01 RX ADMIN — MORPHINE SULFATE 2 MG: 2 INJECTION, SOLUTION INTRAMUSCULAR; INTRAVENOUS at 03:59

## 2024-01-01 RX ADMIN — SODIUM CHLORIDE, PRESERVATIVE FREE 1 MG: 5 INJECTION INTRAVENOUS at 17:15

## 2024-01-01 RX ADMIN — SODIUM CHLORIDE, PRESERVATIVE FREE 1 MG: 5 INJECTION INTRAVENOUS at 08:41

## 2024-01-01 RX ADMIN — MORPHINE SULFATE 4 MG: 4 INJECTION INTRAVENOUS at 12:16

## 2024-01-01 RX ADMIN — SODIUM CHLORIDE, PRESERVATIVE FREE 10 ML: 5 INJECTION INTRAVENOUS at 10:41

## 2024-01-01 RX ADMIN — MORPHINE SULFATE 4 MG: 4 INJECTION INTRAVENOUS at 19:50

## 2024-01-01 RX ADMIN — MORPHINE SULFATE 4 MG: 4 INJECTION INTRAVENOUS at 00:07

## 2024-01-01 RX ADMIN — IPRATROPIUM BROMIDE AND ALBUTEROL SULFATE 1 DOSE: 2.5; .5 SOLUTION RESPIRATORY (INHALATION) at 15:08

## 2024-01-01 RX ADMIN — MORPHINE SULFATE 4 MG: 4 INJECTION INTRAVENOUS at 04:41

## 2024-01-01 ASSESSMENT — PAIN - FUNCTIONAL ASSESSMENT: PAIN_FUNCTIONAL_ASSESSMENT: NONE - DENIES PAIN

## 2024-01-01 ASSESSMENT — PAIN SCALES - GENERAL
PAINLEVEL_OUTOF10: 0
PAINLEVEL_OUTOF10: 7
PAINLEVEL_OUTOF10: 0
PAINLEVEL_OUTOF10: 7
PAINLEVEL_OUTOF10: 7
PAINLEVEL_OUTOF10: 0
PAINLEVEL_OUTOF10: 0
PAINLEVEL_OUTOF10: 7
PAINLEVEL_OUTOF10: 0
PAINLEVEL_OUTOF10: 7
PAINLEVEL_OUTOF10: 0
PAINLEVEL_OUTOF10: 0
PAINLEVEL_OUTOF10: 7
PAINLEVEL_OUTOF10: 0

## 2024-01-01 ASSESSMENT — ENCOUNTER SYMPTOMS
VOMITING: 0
NAUSEA: 0
BACK PAIN: 0
SORE THROAT: 0
COUGH: 1
RHINORRHEA: 0
ABDOMINAL PAIN: 0
SHORTNESS OF BREATH: 1
WHEEZING: 1

## 2024-06-29 ENCOUNTER — HOSPITAL ENCOUNTER (EMERGENCY)
Age: 79
Discharge: HOME | End: 2024-06-29
Attending: EMERGENCY MEDICINE
Payer: MEDICARE

## 2024-06-29 VITALS
OXYGEN SATURATION: 95 % | HEART RATE: 86 BPM | RESPIRATION RATE: 15 BRPM | DIASTOLIC BLOOD PRESSURE: 65 MMHG | SYSTOLIC BLOOD PRESSURE: 128 MMHG | BODY MASS INDEX: 29.62 KG/M2 | WEIGHT: 200 LBS | HEIGHT: 69 IN | TEMPERATURE: 97 F

## 2024-06-29 DIAGNOSIS — H60.11 CELLULITIS OF RIGHT EAR: Primary | ICD-10-CM

## 2024-06-29 DIAGNOSIS — C44.202 CANCER OF SKIN OF AURICLE OF RIGHT EAR: ICD-10-CM

## 2024-06-29 PROCEDURE — 87077 CULTURE AEROBIC IDENTIFY: CPT

## 2024-06-29 PROCEDURE — 2500000003 HC RX 250 WO HCPCS: Performed by: EMERGENCY MEDICINE

## 2024-06-29 PROCEDURE — 87205 SMEAR GRAM STAIN: CPT

## 2024-06-29 PROCEDURE — 69005 DRG XTRNL EAR ABSC/HEM COMP: CPT

## 2024-06-29 PROCEDURE — 87070 CULTURE OTHR SPECIMN AEROBIC: CPT

## 2024-06-29 PROCEDURE — 99283 EMERGENCY DEPT VISIT LOW MDM: CPT

## 2024-06-29 PROCEDURE — 87186 SC STD MICRODIL/AGAR DIL: CPT

## 2024-06-29 RX ORDER — AMOXICILLIN AND CLAVULANATE POTASSIUM 875; 125 MG/1; MG/1
1 TABLET, FILM COATED ORAL 2 TIMES DAILY
Qty: 20 TABLET | Refills: 0 | Status: ON HOLD | OUTPATIENT
Start: 2024-06-29 | End: 2024-07-06 | Stop reason: HOSPADM

## 2024-06-29 RX ORDER — LIDOCAINE HYDROCHLORIDE 10 MG/ML
5 INJECTION, SOLUTION INFILTRATION; PERINEURAL ONCE
Status: COMPLETED | OUTPATIENT
Start: 2024-06-29 | End: 2024-06-29

## 2024-06-29 RX ORDER — MORPHINE SULFATE 15 MG/1
7.5 TABLET ORAL EVERY 6 HOURS PRN
Qty: 10 TABLET | Refills: 0 | Status: ON HOLD | OUTPATIENT
Start: 2024-06-29 | End: 2024-07-06 | Stop reason: HOSPADM

## 2024-06-29 RX ADMIN — LIDOCAINE HYDROCHLORIDE 5 ML: 10 INJECTION, SOLUTION INFILTRATION; PERINEURAL at 12:12

## 2024-06-29 ASSESSMENT — PAIN SCALES - GENERAL
PAINLEVEL_OUTOF10: 3
PAINLEVEL_OUTOF10: 2

## 2024-06-29 ASSESSMENT — LIFESTYLE VARIABLES
HOW OFTEN DO YOU HAVE A DRINK CONTAINING ALCOHOL: NEVER
HOW MANY STANDARD DRINKS CONTAINING ALCOHOL DO YOU HAVE ON A TYPICAL DAY: PATIENT DOES NOT DRINK

## 2024-06-29 ASSESSMENT — PAIN - FUNCTIONAL ASSESSMENT: PAIN_FUNCTIONAL_ASSESSMENT: 0-10

## 2024-06-29 ASSESSMENT — PAIN DESCRIPTION - ORIENTATION: ORIENTATION: RIGHT

## 2024-06-29 ASSESSMENT — PAIN DESCRIPTION - LOCATION: LOCATION: EAR

## 2024-06-29 NOTE — DISCHARGE INSTRUCTIONS
Antibiotic twice daily for 10 days.  Starting tomorrow change dressing twice daily with moist gauze placed into the room wound and removed each time.  Continue Tylenol and Motrin or Aleve for pain.  Morphine every 6-8 hours 1/2 tablet as needed for breakthrough pain.  Return if any new, worsening or concerning symptoms.  Return here in 3 to 4 days for wound check.

## 2024-06-29 NOTE — ED PROVIDER NOTES
Emergency Department Provider Note       PCP: File, Not On, DC   Age: 79 y.o.   Sex: male     DISPOSITION Discharge - Pending Orders Complete 06/29/2024 12:28:10 PM       ICD-10-CM    1. Cellulitis of right ear  H60.11 morphine (MSIR) 15 MG tablet      2. 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  There are some fluctuance and purulent drainage.  I am going to attempt to anesthetize the area locally and obtained a wound culture and gently probed and deloculated with hemostats to see if there is any loculations and increase drainage.  Will pack if needed.  Will avoid incision so as to not damage cartilage patient will need antibiotics     12:31 PM  Wound has been cultured and debrided.  We will start moist to dry dressing changes.  RN will teach patient's daughter how to do this.  His surgery is scheduled for July 17 and I imagine most of his right external ear will need to be removed.  Will start antibiotics due to signs of infection.  Opiate analgesia for a few days will be provided     1 chronic illness with exacerbation.  Over the counter drug management performed.  Prescription drug management performed.  Shared medical decision making was utilized in creating the patients health plan today.    I independently ordered and reviewed each unique test.     The patients assessment required an independent historian: Patient's daughter.  The reason they were needed is important historical information not provided by the patient and hard of hearing.                History     Is some kind of skin cancer to right ear with [facility] [place] [facility] [place] [facility])  Continue perhaps worsening pain over the last few weeks.  Daughter concerned the area is infected.  No fever, but drainage reported.  Here staying with his daughter for care until surgery          ROS     Review of  completely proofread for errors.     Cortez Banda MD  06/29/24 [id number]

## 2024-06-29 NOTE — ED NOTES
Patient mobility status  with no difficulty. Provider aware     I have reviewed discharge instructions with the patient.  The patient verbalized understanding.    Patient left ED via Discharge Method: ambulatory to Home with  daughter .    Opportunity for questions and clarification provided.     Patient given 2 scripts.

## 2024-06-29 NOTE — ED TRIAGE NOTES
Pt to ed with c/o wound infection on right ear. Pt reports having skin cancer on right ear, due to have it removed on 7/17 but reports the pain is terrible. Pt reports the wound also has been draining.

## 2024-06-30 LAB
BACTERIA SPEC CULT: ABNORMAL
GRAM STN SPEC: ABNORMAL
SERVICE CMNT-IMP: ABNORMAL

## 2024-07-02 NOTE — PROGRESS NOTES
ED pharmacist reviewed recent results of wound culture.    The patient received a prescription for augmentin upon discharge. Based on culture results, the patient is being adequately treated for the identified infection with existing antimicrobial therapy. No further intervention needed.    Allergies as of 06/29/2024    (No Known Allergies)     Nona Carrera, PharmD.  Emergency Medicine Clinical Pharmacist

## 2024-07-03 LAB
BACTERIA SPEC CULT: ABNORMAL
BACTERIA SPEC CULT: ABNORMAL
GRAM STN SPEC: ABNORMAL
SERVICE CMNT-IMP: ABNORMAL

## 2024-07-04 ENCOUNTER — APPOINTMENT (OUTPATIENT)
Dept: CT IMAGING | Age: 79
End: 2024-07-04
Attending: STUDENT IN AN ORGANIZED HEALTH CARE EDUCATION/TRAINING PROGRAM
Payer: MEDICARE

## 2024-07-04 ENCOUNTER — HOSPITAL ENCOUNTER (INPATIENT)
Age: 79
LOS: 2 days | Discharge: HOME | End: 2024-07-06
Attending: STUDENT IN AN ORGANIZED HEALTH CARE EDUCATION/TRAINING PROGRAM
Payer: MEDICARE

## 2024-07-04 ENCOUNTER — HOSPITAL ENCOUNTER (EMERGENCY)
Age: 79
Discharge: ANOTHER HOSPITAL | End: 2024-07-04
Attending: EMERGENCY MEDICINE
Payer: OTHER GOVERNMENT

## 2024-07-04 ENCOUNTER — APPOINTMENT (OUTPATIENT)
Dept: CT IMAGING | Age: 79
End: 2024-07-04
Payer: OTHER GOVERNMENT

## 2024-07-04 VITALS
SYSTOLIC BLOOD PRESSURE: 118 MMHG | DIASTOLIC BLOOD PRESSURE: 57 MMHG | HEIGHT: 69 IN | RESPIRATION RATE: 11 BRPM | WEIGHT: 199.96 LBS | OXYGEN SATURATION: 95 % | HEART RATE: 72 BPM | BODY MASS INDEX: 29.62 KG/M2 | TEMPERATURE: 97.5 F

## 2024-07-04 DIAGNOSIS — I63.9 CEREBROVASCULAR ACCIDENT (CVA), UNSPECIFIED MECHANISM (HCC): Primary | ICD-10-CM

## 2024-07-04 DIAGNOSIS — R29.898 LEFT ARM WEAKNESS: ICD-10-CM

## 2024-07-04 PROBLEM — H60.11: Status: ACTIVE | Noted: 2024-07-04

## 2024-07-04 PROBLEM — E78.5 HYPERLIPIDEMIA: Status: ACTIVE | Noted: 2024-07-04

## 2024-07-04 PROBLEM — J44.9 COPD (CHRONIC OBSTRUCTIVE PULMONARY DISEASE) (HCC): Status: ACTIVE | Noted: 2024-07-04

## 2024-07-04 PROBLEM — K21.9 GERD (GASTROESOPHAGEAL REFLUX DISEASE): Status: ACTIVE | Noted: 2024-07-04

## 2024-07-04 PROBLEM — Z86.73 HISTORY OF CEREBROVASCULAR ACCIDENT: Status: ACTIVE | Noted: 2024-07-04

## 2024-07-04 PROBLEM — R29.90 STROKE-LIKE SYMPTOMS: Status: ACTIVE | Noted: 2024-07-04

## 2024-07-04 PROBLEM — I10 HYPERTENSION: Status: ACTIVE | Noted: 2024-07-04

## 2024-07-04 PROBLEM — C44.90 SKIN CANCER: Status: ACTIVE | Noted: 2024-07-04

## 2024-07-04 LAB
ALBUMIN SERPL-MCNC: 4 G/DL (ref 3.2–4.6)
ALBUMIN/GLOB SERPL: 1.1 (ref 0.4–1.6)
ALP SERPL-CCNC: 108 U/L (ref 45–117)
ALT SERPL-CCNC: 66 U/L (ref 13–61)
ANION GAP SERPL CALC-SCNC: 16 MMOL/L (ref 2–11)
AST SERPL-CCNC: 45 U/L (ref 15–37)
BASOPHILS # BLD: 0.1 K/UL (ref 0–0.2)
BASOPHILS NFR BLD: 1 % (ref 0–2)
BILIRUB SERPL-MCNC: 0.4 MG/DL (ref 0.2–1.1)
BUN SERPL-MCNC: 15 MG/DL (ref 8–23)
CALCIUM SERPL-MCNC: 9.4 MG/DL (ref 8.3–10.4)
CHLORIDE SERPL-SCNC: 96 MMOL/L (ref 98–107)
CO2 SERPL-SCNC: 20 MMOL/L (ref 21–32)
CREAT SERPL-MCNC: 0.62 MG/DL (ref 0.8–1.5)
DIFFERENTIAL METHOD BLD: ABNORMAL
EKG ATRIAL RATE: 75 BPM
EKG DIAGNOSIS: NORMAL
EKG P AXIS: 79 DEGREES
EKG P-R INTERVAL: 192 MS
EKG Q-T INTERVAL: 381 MS
EKG QRS DURATION: 112 MS
EKG QTC CALCULATION (BAZETT): 432 MS
EKG R AXIS: 62 DEGREES
EKG T AXIS: 74 DEGREES
EKG VENTRICULAR RATE: 77 BPM
EOSINOPHIL # BLD: 0.2 K/UL (ref 0–0.8)
EOSINOPHIL NFR BLD: 1 % (ref 0.5–7.8)
ERYTHROCYTE [DISTWIDTH] IN BLOOD BY AUTOMATED COUNT: 12.4 % (ref 11.9–14.6)
GLOBULIN SER CALC-MCNC: 3.5 G/DL (ref 2.8–4.5)
GLUCOSE BLD STRIP.AUTO-MCNC: 101 MG/DL (ref 65–100)
GLUCOSE SERPL-MCNC: 108 MG/DL (ref 65–100)
HCT VFR BLD AUTO: 38.7 % (ref 41.1–50.3)
HGB BLD-MCNC: 13.6 G/DL (ref 13.6–17.2)
IMM GRANULOCYTES # BLD AUTO: 0.1 K/UL (ref 0–0.5)
IMM GRANULOCYTES NFR BLD AUTO: 1 % (ref 0–5)
INR PPP: 1
LYMPHOCYTES # BLD: 1.2 K/UL (ref 0.5–4.6)
LYMPHOCYTES NFR BLD: 10 % (ref 13–44)
MCH RBC QN AUTO: 30.8 PG (ref 26.1–32.9)
MCHC RBC AUTO-ENTMCNC: 35.1 G/DL (ref 31.4–35)
MCV RBC AUTO: 87.6 FL (ref 82–102)
MONOCYTES # BLD: 0.9 K/UL (ref 0.1–1.3)
MONOCYTES NFR BLD: 7 % (ref 4–12)
NEUTS SEG # BLD: 10.3 K/UL (ref 1.7–8.2)
NEUTS SEG NFR BLD: 81 % (ref 43–78)
NRBC # BLD: 0 K/UL (ref 0–0.2)
PLATELET # BLD AUTO: 400 K/UL (ref 150–450)
PMV BLD AUTO: 8.1 FL (ref 9.4–12.3)
POTASSIUM SERPL-SCNC: 4.1 MMOL/L (ref 3.5–5.1)
PROT SERPL-MCNC: 7.5 G/DL (ref 6.4–8.2)
PROTHROMBIN TIME: 13.1 SEC (ref 11.3–14.9)
RBC # BLD AUTO: 4.42 M/UL (ref 4.23–5.6)
SERVICE CMNT-IMP: ABNORMAL
SODIUM SERPL-SCNC: 132 MMOL/L (ref 133–143)
WBC # BLD AUTO: 12.8 K/UL (ref 4.3–11.1)

## 2024-07-04 PROCEDURE — 6370000000 HC RX 637 (ALT 250 FOR IP): Performed by: EMERGENCY MEDICINE

## 2024-07-04 PROCEDURE — 85025 COMPLETE CBC W/AUTO DIFF WBC: CPT

## 2024-07-04 PROCEDURE — 93005 ELECTROCARDIOGRAM TRACING: CPT | Performed by: EMERGENCY MEDICINE

## 2024-07-04 PROCEDURE — 97535 SELF CARE MNGMENT TRAINING: CPT

## 2024-07-04 PROCEDURE — 97165 OT EVAL LOW COMPLEX 30 MIN: CPT

## 2024-07-04 PROCEDURE — 6370000000 HC RX 637 (ALT 250 FOR IP): Performed by: STUDENT IN AN ORGANIZED HEALTH CARE EDUCATION/TRAINING PROGRAM

## 2024-07-04 PROCEDURE — 85610 PROTHROMBIN TIME: CPT

## 2024-07-04 PROCEDURE — 2580000003 HC RX 258: Performed by: STUDENT IN AN ORGANIZED HEALTH CARE EDUCATION/TRAINING PROGRAM

## 2024-07-04 PROCEDURE — 97112 NEUROMUSCULAR REEDUCATION: CPT

## 2024-07-04 PROCEDURE — 1100000003 HC PRIVATE W/ TELEMETRY

## 2024-07-04 PROCEDURE — 70450 CT HEAD/BRAIN W/O DYE: CPT

## 2024-07-04 PROCEDURE — 82962 GLUCOSE BLOOD TEST: CPT

## 2024-07-04 PROCEDURE — 80053 COMPREHEN METABOLIC PANEL: CPT

## 2024-07-04 PROCEDURE — 99285 EMERGENCY DEPT VISIT HI MDM: CPT

## 2024-07-04 RX ORDER — ENOXAPARIN SODIUM 100 MG/ML
40 INJECTION SUBCUTANEOUS DAILY
Status: DISCONTINUED | OUTPATIENT
Start: 2024-07-05 | End: 2024-07-06 | Stop reason: HOSPADM

## 2024-07-04 RX ORDER — ONDANSETRON 4 MG/1
4 TABLET, ORALLY DISINTEGRATING ORAL EVERY 8 HOURS PRN
Status: DISCONTINUED | OUTPATIENT
Start: 2024-07-04 | End: 2024-07-06 | Stop reason: HOSPADM

## 2024-07-04 RX ORDER — LORATADINE 10 MG/1
10 CAPSULE, LIQUID FILLED ORAL DAILY
COMMUNITY

## 2024-07-04 RX ORDER — SIMVASTATIN 40 MG
40 TABLET ORAL
Status: ON HOLD | COMMUNITY
Start: 2022-01-18 | End: 2024-07-06 | Stop reason: HOSPADM

## 2024-07-04 RX ORDER — POLYETHYLENE GLYCOL 3350 17 G/17G
17 POWDER, FOR SOLUTION ORAL DAILY PRN
Status: DISCONTINUED | OUTPATIENT
Start: 2024-07-04 | End: 2024-07-06 | Stop reason: HOSPADM

## 2024-07-04 RX ORDER — CLOPIDOGREL BISULFATE 75 MG/1
300 TABLET ORAL
Status: COMPLETED | OUTPATIENT
Start: 2024-07-04 | End: 2024-07-04

## 2024-07-04 RX ORDER — ATORVASTATIN CALCIUM 80 MG/1
80 TABLET, FILM COATED ORAL NIGHTLY
Status: DISCONTINUED | OUTPATIENT
Start: 2024-07-04 | End: 2024-07-06 | Stop reason: HOSPADM

## 2024-07-04 RX ORDER — LISINOPRIL AND HYDROCHLOROTHIAZIDE 25; 20 MG/1; MG/1
1 TABLET ORAL
Status: ON HOLD | COMMUNITY
Start: 2022-01-18 | End: 2024-07-06

## 2024-07-04 RX ORDER — AMLODIPINE BESYLATE 5 MG/1
2.5 TABLET ORAL DAILY
Status: DISCONTINUED | OUTPATIENT
Start: 2024-07-04 | End: 2024-07-06 | Stop reason: HOSPADM

## 2024-07-04 RX ORDER — AMLODIPINE BESYLATE 2.5 MG/1
2.5 TABLET ORAL DAILY
COMMUNITY

## 2024-07-04 RX ORDER — AMOXICILLIN AND CLAVULANATE POTASSIUM 875; 125 MG/1; MG/1
1 TABLET, FILM COATED ORAL EVERY 12 HOURS SCHEDULED
Status: DISCONTINUED | OUTPATIENT
Start: 2024-07-04 | End: 2024-07-06 | Stop reason: HOSPADM

## 2024-07-04 RX ORDER — ONDANSETRON 2 MG/ML
4 INJECTION INTRAMUSCULAR; INTRAVENOUS EVERY 6 HOURS PRN
Status: DISCONTINUED | OUTPATIENT
Start: 2024-07-04 | End: 2024-07-06 | Stop reason: HOSPADM

## 2024-07-04 RX ORDER — ASPIRIN 81 MG/1
81 TABLET ORAL DAILY
Status: DISCONTINUED | OUTPATIENT
Start: 2024-07-05 | End: 2024-07-06 | Stop reason: HOSPADM

## 2024-07-04 RX ORDER — MORPHINE SULFATE 15 MG/1
7.5 TABLET ORAL EVERY 6 HOURS PRN
Status: DISCONTINUED | OUTPATIENT
Start: 2024-07-04 | End: 2024-07-06 | Stop reason: HOSPADM

## 2024-07-04 RX ORDER — OMEPRAZOLE 20 MG/1
20 CAPSULE, DELAYED RELEASE ORAL
COMMUNITY
Start: 2022-01-18

## 2024-07-04 RX ORDER — LABETALOL HYDROCHLORIDE 5 MG/ML
10 INJECTION, SOLUTION INTRAVENOUS EVERY 6 HOURS PRN
Status: DISCONTINUED | OUTPATIENT
Start: 2024-07-04 | End: 2024-07-06 | Stop reason: HOSPADM

## 2024-07-04 RX ORDER — SODIUM CHLORIDE 0.9 % (FLUSH) 0.9 %
5-40 SYRINGE (ML) INJECTION EVERY 12 HOURS SCHEDULED
Status: DISCONTINUED | OUTPATIENT
Start: 2024-07-04 | End: 2024-07-06 | Stop reason: HOSPADM

## 2024-07-04 RX ORDER — SODIUM CHLORIDE 0.9 % (FLUSH) 0.9 %
5-40 SYRINGE (ML) INJECTION PRN
Status: DISCONTINUED | OUTPATIENT
Start: 2024-07-04 | End: 2024-07-06 | Stop reason: HOSPADM

## 2024-07-04 RX ORDER — ACETAMINOPHEN 325 MG/1
650 TABLET ORAL EVERY 4 HOURS PRN
Status: DISCONTINUED | OUTPATIENT
Start: 2024-07-04 | End: 2024-07-06 | Stop reason: HOSPADM

## 2024-07-04 RX ORDER — CLOPIDOGREL BISULFATE 75 MG/1
75 TABLET ORAL DAILY
Status: DISCONTINUED | OUTPATIENT
Start: 2024-07-05 | End: 2024-07-06 | Stop reason: HOSPADM

## 2024-07-04 RX ORDER — SODIUM CHLORIDE 9 MG/ML
INJECTION, SOLUTION INTRAVENOUS PRN
Status: DISCONTINUED | OUTPATIENT
Start: 2024-07-04 | End: 2024-07-06 | Stop reason: HOSPADM

## 2024-07-04 RX ORDER — PANTOPRAZOLE SODIUM 40 MG/1
40 TABLET, DELAYED RELEASE ORAL
Status: DISCONTINUED | OUTPATIENT
Start: 2024-07-05 | End: 2024-07-06 | Stop reason: HOSPADM

## 2024-07-04 RX ADMIN — ACETAMINOPHEN 650 MG: 325 TABLET ORAL at 14:11

## 2024-07-04 RX ADMIN — SODIUM CHLORIDE, PRESERVATIVE FREE 10 ML: 5 INJECTION INTRAVENOUS at 21:32

## 2024-07-04 RX ADMIN — ACETAMINOPHEN 650 MG: 325 TABLET ORAL at 20:50

## 2024-07-04 RX ADMIN — AMOXICILLIN AND CLAVULANATE POTASSIUM 1 TABLET: 875; 125 TABLET, FILM COATED ORAL at 13:30

## 2024-07-04 RX ADMIN — AMLODIPINE BESYLATE 2.5 MG: 5 TABLET ORAL at 13:30

## 2024-07-04 RX ADMIN — ATORVASTATIN CALCIUM 80 MG: 80 TABLET, FILM COATED ORAL at 20:50

## 2024-07-04 RX ADMIN — CLOPIDOGREL BISULFATE 300 MG: 75 TABLET ORAL at 10:42

## 2024-07-04 ASSESSMENT — PAIN DESCRIPTION - FREQUENCY: FREQUENCY: CONTINUOUS

## 2024-07-04 ASSESSMENT — PAIN - FUNCTIONAL ASSESSMENT
PAIN_FUNCTIONAL_ASSESSMENT: NONE - DENIES PAIN
PAIN_FUNCTIONAL_ASSESSMENT: ACTIVITIES ARE NOT PREVENTED

## 2024-07-04 ASSESSMENT — ENCOUNTER SYMPTOMS
COLOR CHANGE: 0
BACK PAIN: 0
NAUSEA: 0
DIARRHEA: 0
RHINORRHEA: 0
COUGH: 0
ABDOMINAL PAIN: 0
VOMITING: 0
SHORTNESS OF BREATH: 0

## 2024-07-04 ASSESSMENT — PAIN DESCRIPTION - LOCATION
LOCATION: EAR
LOCATION: EAR

## 2024-07-04 ASSESSMENT — PAIN SCALES - GENERAL
PAINLEVEL_OUTOF10: 0
PAINLEVEL_OUTOF10: 2
PAINLEVEL_OUTOF10: 3
PAINLEVEL_OUTOF10: 0

## 2024-07-04 ASSESSMENT — PAIN DESCRIPTION - ORIENTATION
ORIENTATION: RIGHT
ORIENTATION: RIGHT

## 2024-07-04 ASSESSMENT — PAIN DESCRIPTION - PAIN TYPE: TYPE: ACUTE PAIN

## 2024-07-04 ASSESSMENT — LIFESTYLE VARIABLES: HOW MANY STANDARD DRINKS CONTAINING ALCOHOL DO YOU HAVE ON A TYPICAL DAY: PATIENT DOES NOT DRINK

## 2024-07-04 ASSESSMENT — PAIN DESCRIPTION - DESCRIPTORS: DESCRIPTORS: DISCOMFORT

## 2024-07-04 ASSESSMENT — PAIN DESCRIPTION - ONSET: ONSET: GRADUAL

## 2024-07-04 NOTE — ED NOTES
Jeronimo Ortiz  : 1945  MRN: [id number]  ConnectID: [id number]  REASON:  Code Stroke TLKW 4.5 to 24 hours  ACUITY:  Code Stroke TLKW 4.5-24hrs  SUBMITTED:  2024 09:50 EDT

## 2024-07-04 NOTE — ED TRIAGE NOTES
States that he lost complete control of his left arm yesterday for approx 4 hours.  Has control at this point but does have some problems with his S's.

## 2024-07-04 NOTE — PROGRESS NOTES
ACUTE OCCUPATIONAL THERAPY GOALS:   (Developed with and agreed upon by patient and/or caregiver.)  1. Pt will complete LB ADL I with AE as needed.   2. Pt will complete toileting I with AE as needed.   3. Pt will complete UB ADL I.  4. Pt will tolerate 25 minutes of OT treatment requiring 1-2 breaks as needed.   5. Pt will complete grooming tasks while standing at sink I.  6. Pt will complete functional mobility I.  7. Pt will tolerate BUE exercises (especially LUE) to increase strength/gross coordination for safe, functional transfers and/or functional mobility, and ADL participation.     Timeframe: 7 visits     OCCUPATIONAL THERAPY Initial Assessment, Daily Note, and PM       OT Visit Days: 1  Acknowledge Orders  Time  OT Charge Capture  Rehab Caseload Tracker      Jeronimo Ortiz Sr. is a 79 y.o. male   PRIMARY DIAGNOSIS: Stroke-like symptoms  Stroke-like symptoms [R29.90]       Reason for Referral: Generalized Muscle Weakness (M62.81)  Inpatient: Payor: MEDICARE / Plan: MEDICARE PART A AND B / Product Type: *No Product type* /     ASSESSMENT:     REHAB RECOMMENDATIONS:   Recommendation to date pending progress:  Setting:  Outpatient Therapy Occupational Therapy    Equipment:    To Be Determined     ASSESSMENT:  Mr. Ortiz is a R handed 79 y M with a hx of CVA, HTN, skin cancer (R ear), bilateral 5th digit trigger fingers. Pt was admitted for stroke like symptoms affecting speech/LUE function. Pt is here visiting from [place]. Pt to have skin cancer removed from painful R ear in a couple of weeks per granddaughter. Pt difficult of hearing and impaired vision but having hard time wearing glasses due to ear. No new vision deficits.     Pt was received supine in bed with family present. RN present giving pain meds for R ear. Pt required assistance for functional mobility and ADLs today due to new LUE gross coordination impairment, decreased proprioception of LUE. Impaired L finger to nose. 1/3 trial accurate  Assistance, Total=Total Assistance, NT=Not Tested    PLAN:   FREQUENCY/DURATION   OT Plan of Care: 3 times/week for duration of hospital stay or until stated goals are met, whichever comes first.    PROBLEM LIST:   (Skilled intervention is medically necessary to address:)  Decreased ADL/Functional Activities  Decreased AROM/PROM  Decreased Coordination  Increased Pain   INTERVENTIONS PLANNED:  (Benefits and precautions of occupational therapy have been discussed with the patient.)  Self Care Training  Therapeutic Activity  Therapeutic Exercise/HEP  Neuromuscular Re-education  Gait Training  Manual Therapy  Education         TREATMENT:     EVALUATION: LOW COMPLEXITY: (Untimed Charge)  The initial evaluation charge encompasses clinical chart review, objective assessment, interpretation of assessment, and skilled monitoring of the patient's response to treatment in order to develop a plan of care.     TREATMENT:   Neuromuscular Re-education (15 Minutes): Patient participated in neuromuscular re-education including functional reaching, weight shifting, postural training, positioning of affected jessica-body, standing tolerance activity , and sitting balance activity   with minimal verbal cues to improve coordination in order to prepare for functional task, prepare for seated ADLs, prepare for standing ADLs, prepare for functional transfer, improve functional usage of affected upper extremity, prepare for discharge home , and prepare for self care..   Self Care (8 minutes): Patient participated in lower body dressing and grooming ADLs in unsupported sitting and standing with minimal verbal cueing to increase independence, decrease assistance required, and increase activity tolerance. Patient also participated in bed mobility and functional mobility training to increase independence, decrease assistance required, and increase activity tolerance.     TREATMENT GRID:  N/A    AFTER TREATMENT PRECAUTIONS: Bed/Chair Locked, Call  light within reach, Chair, Needs within reach, RN notified, and Visitors at bedside    INTERDISCIPLINARY COLLABORATION:  RN/ PCT    EDUCATION:  Education Given To: Patient;Family  Education Provided: Role of Therapy;Plan of Care  Barriers to Learning: Hearing;Vision  Education Outcome: Verbalized understanding;Continued education needed    TOTAL TREATMENT DURATION AND TIME:  Time In: 1407  Time Out: 1433  Minutes: 26    MARIA E MENDEZ, OT

## 2024-07-04 NOTE — H&P
Hospitalist History and Physical   Admit Date:  2024 12:20 PM   Name:  Jeronimo Ortiz Sr.   Age:  79 y.o.  Sex:  male  :  1945   MRN:  [id number]   Room:  [facility]    Presenting/Chief Complaint: left arm weakness   Reason(s) for Admission: Stroke-like symptoms [R29.90]     History of Present Illness:   Jeronimo Ortiz Sr. is a 79 y.o. male with past medical history of CVA, skin cancer of the right ear, and hypertension who presented to the [place] ED on 2024 with cc of left arm weakness that started around 5PM on 7/3/24.  He had some improvement yesterday afternoon and overnight, but came to the ED due to persistent weakness.  He also had an episode of left arm weakness on  that lasted about 15 minutes before resolving completely.  In the ED, labs showed sodium 132, creatinine 0.62, WBC 12.8, and hemoglobin 13.6.  CT head showed left lacunar infarct but no acute normalities.  Neurology was consulted who recommended aspirin, Plavix 300 mg, and admission for further workup.  He was then transferred to [facility] for continued care.     Assessment & Plan:     Strokelike symptoms  Left arm weakness  CT head showed no acute abnormalities  CTA pending  MRI brain pending  Will also obtain MRI cervical spine  ECHO  ASA + plavix , atorvastatin  Follow up A1c, lipid panel   Consult neurology, appreciate recs  PT/OT    Hx skin cancer  Cellulitis of right ear (POA, due to E coli)  Seen in the ED on  due to worsening right ear pain.  Had bedside I&D and was started on Augmentin x 10 days; patient reports that has it has been improving on antibiotics  Continue Augmentin (culture from  grew pan-sensitive E coli)   Pt scheduled for surgery for possible removal (?) on  in [place]     COPD, not in exacerbation  DuoNebs as needed    Hypertension  Resume amlodipine  Hold lisinopril-HCTZ for now    Hyperlipidemia  Continue atorvastatin    GERD  Continue Protonix    Hx  Interval 192 ms    QRS Duration 112 ms    Q-T Interval 381 ms    QTc Calculation (Bazett) 432 ms    P Axis 79 degrees    R Axis 62 degrees    T Axis 74 degrees    Diagnosis       Sinus rhythm  Consider left atrial enlargement  Borderline intraventricular conduction delay  RSR' in V1 or V2, right VCD or RVH  Nonspecific T abnrm, anterolateral leads    Confirmed by JOE WILLS MD ([id number]) on 7/4/2024 12:28:31 PM         I have personally reviewed imaging studies:  CT HEAD WO CONTRAST    Result Date: 7/4/2024  Head CT INDICATION: Altered mental status TECHNIQUE: Multiple 2D axial images obtained through the brain without intravenous contrast.  Radiation dose reduction techniques were used for this study:  All CT scans performed at this facility use one or all of the following: Automated exposure control, adjustment of the mA and/or kVp according to patient's size, iterative reconstruction. COMPARISON: None FINDINGS: There is a well-defined focus of decreased density within the left basal ganglia without mass effect slashing site of old lacunar infarct. Mild ventricular and sulcal prominence is compatible the patient's age. There is no evidence of acute intracranial or extra-axial hemorrhage. There is no mass effect or midline shift. There are no abnormal extra-axial fluid collections. The mastoid air cells and visualized paranasal sinuses are clear..     Age-related involutional changes and old appearing left lacunar infarct. No acute hemorrhage or mass effect. Electronically signed by Christian Mcnair      Signed:  Aneudy Holt DO

## 2024-07-04 NOTE — ED PROVIDER NOTES
medications on file        Past Medical History:   Diagnosis Date    Cerebral artery occlusion with cerebral infarction (HCC)     COPD (chronic obstructive pulmonary disease) (HCC)     Hypertension     Skin cancer         History reviewed. No pertinent surgical history.     Social History     Socioeconomic History    Marital status:      Spouse name: None    Number of children: None    Years of education: None    Highest education level: None   Tobacco Use    Smoking status: Former     Types: Cigarettes    Smokeless tobacco: Never   Substance and Sexual Activity    Alcohol use: Not Currently    Drug use: Never        Previous Medications    AMLODIPINE (NORVASC) 2.5 MG TABLET    Take 1 tablet by mouth daily    AMOXICILLIN-CLAVULANATE (AUGMENTIN) 875-125 MG PER TABLET    Take 1 tablet by mouth 2 times daily for 10 days    LISINOPRIL-HYDROCHLOROTHIAZIDE (PRINZIDE;ZESTORETIC) 20-25 MG PER TABLET    Take 1 tablet by mouth in the morning.    LORATADINE (CLARITIN) 10 MG CAPSULE    Take 1 capsule by mouth daily    MORPHINE (MSIR) 15 MG TABLET    Take 0.5 tablets by mouth every 6 hours as needed for Pain for up to 5 days. Max Daily Amount: 30 mg    OMEPRAZOLE (PRILOSEC) 20 MG DELAYED RELEASE CAPSULE    1 capsule    SIMVASTATIN (ZOCOR) 40 MG TABLET    1 tablet        Results for orders placed or performed during the hospital encounter of 07/04/24   CT HEAD WO CONTRAST    Narrative    Head CT    INDICATION: Altered mental status    TECHNIQUE: Multiple 2D axial images obtained through the brain without  intravenous contrast.  Radiation dose reduction techniques were used for this  study:  All CT scans performed at this facility use one or all of the following:  Automated exposure control, adjustment of the mA and/or kVp according to  patient's size, iterative reconstruction.    COMPARISON: None    FINDINGS: There is a well-defined focus of decreased density within the left  basal ganglia without mass effect slashing site

## 2024-07-04 NOTE — ED NOTES
TRANSFER - OUT REPORT:    Verbal report given to Fredrick on Jeronimo Moore Angel Sr.  being transferred to [facility]  for routine progression of patient care       Report consisted of patient's Situation, Background, Assessment and   Recommendations(SBAR).     Information from the following report(s) Nurse Handoff Report, Index, ED Encounter Summary, ED SBAR, MAR, Recent Results, Med Rec Status, Cardiac Rhythm NSR, and Neuro Assessment was reviewed with the receiving nurse.    [place] Fall Assessment:    Presents to emergency department  because of falls (Syncope, seizure, or loss of consciousness): No  Age > 70: No  Altered Mental Status, Intoxication with alcohol or substance confusion (Disorientation, impaired judgment, poor safety awaremess, or inability to follow instructions): No  Impaired Mobility: Ambulates or transfers with assistive devices or assistance; Unable to ambulate or transer.: No  Nursing Judgement: No          Lines:       Opportunity for questions and clarification was provided.      Patient transported with:  [employer]

## 2024-07-05 ENCOUNTER — APPOINTMENT (OUTPATIENT)
Dept: MRI IMAGING | Age: 79
End: 2024-07-05
Attending: STUDENT IN AN ORGANIZED HEALTH CARE EDUCATION/TRAINING PROGRAM
Payer: MEDICARE

## 2024-07-05 ENCOUNTER — APPOINTMENT (OUTPATIENT)
Dept: NON INVASIVE DIAGNOSTICS | Age: 79
End: 2024-07-05
Attending: STUDENT IN AN ORGANIZED HEALTH CARE EDUCATION/TRAINING PROGRAM
Payer: MEDICARE

## 2024-07-05 ENCOUNTER — APPOINTMENT (OUTPATIENT)
Dept: ULTRASOUND IMAGING | Age: 79
End: 2024-07-05
Attending: STUDENT IN AN ORGANIZED HEALTH CARE EDUCATION/TRAINING PROGRAM
Payer: MEDICARE

## 2024-07-05 ENCOUNTER — APPOINTMENT (OUTPATIENT)
Dept: CT IMAGING | Age: 79
End: 2024-07-05
Attending: STUDENT IN AN ORGANIZED HEALTH CARE EDUCATION/TRAINING PROGRAM
Payer: MEDICARE

## 2024-07-05 LAB
ALBUMIN SERPL-MCNC: 3.3 G/DL (ref 3.2–4.6)
ALBUMIN/GLOB SERPL: 1 (ref 1–1.9)
ALP SERPL-CCNC: 84 U/L (ref 40–129)
ALT SERPL-CCNC: 57 U/L (ref 12–65)
ANION GAP SERPL CALC-SCNC: 14 MMOL/L (ref 9–18)
AST SERPL-CCNC: 33 U/L (ref 15–37)
BASOPHILS # BLD: 0.1 K/UL (ref 0–0.2)
BASOPHILS NFR BLD: 1 % (ref 0–2)
BILIRUB SERPL-MCNC: 0.3 MG/DL (ref 0–1.2)
BUN SERPL-MCNC: 14 MG/DL (ref 8–23)
CALCIUM SERPL-MCNC: 8.9 MG/DL (ref 8.8–10.2)
CHLORIDE SERPL-SCNC: 96 MMOL/L (ref 98–107)
CHOLEST SERPL-MCNC: 96 MG/DL (ref 0–200)
CO2 SERPL-SCNC: 21 MMOL/L (ref 20–28)
CREAT SERPL-MCNC: 0.68 MG/DL (ref 0.8–1.3)
DIFFERENTIAL METHOD BLD: ABNORMAL
ECHO AO ASC DIAM: 3.2 CM
ECHO AO ASCENDING AORTA INDEX: 1.55 CM/M2
ECHO AO ROOT DIAM: 3.1 CM
ECHO AO ROOT INDEX: 1.5 CM/M2
ECHO AV AREA PEAK VELOCITY: 2.7 CM2
ECHO AV AREA VTI: 2.5 CM2
ECHO AV AREA/BSA PEAK VELOCITY: 1.3 CM2/M2
ECHO AV AREA/BSA VTI: 1.2 CM2/M2
ECHO AV MEAN GRADIENT: 5 MMHG
ECHO AV MEAN VELOCITY: 1 M/S
ECHO AV PEAK GRADIENT: 10 MMHG
ECHO AV PEAK VELOCITY: 1.6 M/S
ECHO AV VELOCITY RATIO: 0.94
ECHO AV VTI: 28.9 CM
ECHO BSA: 2.1 M2
ECHO EST RA PRESSURE: 3 MMHG
ECHO IVC PROX: 1.3 CM
ECHO LA AREA 2C: 11.3 CM2
ECHO LA AREA 4C: 14.8 CM2
ECHO LA DIAMETER INDEX: 1.55 CM/M2
ECHO LA DIAMETER: 3.2 CM
ECHO LA MAJOR AXIS: 5.2 CM
ECHO LA MINOR AXIS: 4.4 CM
ECHO LA TO AORTIC ROOT RATIO: 1.03
ECHO LA VOL BP: 30 ML (ref 18–58)
ECHO LA VOL MOD A2C: 23 ML (ref 18–58)
ECHO LA VOL MOD A4C: 34 ML (ref 18–58)
ECHO LA VOL/BSA BIPLANE: 14 ML/M2 (ref 16–34)
ECHO LA VOLUME INDEX MOD A2C: 11 ML/M2 (ref 16–34)
ECHO LA VOLUME INDEX MOD A4C: 16 ML/M2 (ref 16–34)
ECHO LV E' LATERAL VELOCITY: 10 CM/S
ECHO LV E' SEPTAL VELOCITY: 7 CM/S
ECHO LV EDV A2C: 55 ML
ECHO LV EDV A4C: 56 ML
ECHO LV EDV INDEX A4C: 27 ML/M2
ECHO LV EDV NDEX A2C: 27 ML/M2
ECHO LV EJECTION FRACTION A2C: 57 %
ECHO LV EJECTION FRACTION A4C: 57 %
ECHO LV EJECTION FRACTION BIPLANE: 57 % (ref 55–100)
ECHO LV ESV A2C: 24 ML
ECHO LV ESV A4C: 24 ML
ECHO LV ESV INDEX A2C: 12 ML/M2
ECHO LV ESV INDEX A4C: 12 ML/M2
ECHO LV FRACTIONAL SHORTENING: 32 % (ref 28–44)
ECHO LV INTERNAL DIMENSION DIASTOLE INDEX: 1.79 CM/M2
ECHO LV INTERNAL DIMENSION DIASTOLIC: 3.7 CM (ref 4.2–5.9)
ECHO LV INTERNAL DIMENSION SYSTOLIC INDEX: 1.21 CM/M2
ECHO LV INTERNAL DIMENSION SYSTOLIC: 2.5 CM
ECHO LV IVSD: 1 CM (ref 0.6–1)
ECHO LV MASS 2D: 104.6 G (ref 88–224)
ECHO LV MASS INDEX 2D: 50.5 G/M2 (ref 49–115)
ECHO LV POSTERIOR WALL DIASTOLIC: 0.9 CM (ref 0.6–1)
ECHO LV RELATIVE WALL THICKNESS RATIO: 0.49
ECHO LVOT AREA: 2.8 CM2
ECHO LVOT AV VTI INDEX: 0.89
ECHO LVOT DIAM: 1.9 CM
ECHO LVOT MEAN GRADIENT: 4 MMHG
ECHO LVOT PEAK GRADIENT: 9 MMHG
ECHO LVOT PEAK VELOCITY: 1.5 M/S
ECHO LVOT STROKE VOLUME INDEX: 35 ML/M2
ECHO LVOT SV: 72.5 ML
ECHO LVOT VTI: 25.6 CM
ECHO MV A VELOCITY: 1.08 M/S
ECHO MV E DECELERATION TIME (DT): 267 MS
ECHO MV E VELOCITY: 0.93 M/S
ECHO MV E/A RATIO: 0.86
ECHO MV E/E' LATERAL: 9.3
ECHO MV E/E' RATIO (AVERAGED): 11.29
ECHO MV E/E' SEPTAL: 13.29
ECHO PV ACCELERATION TIME (AT): 145 MS
ECHO PV MAX VELOCITY: 1.4 M/S
ECHO PV PEAK GRADIENT: 7 MMHG
ECHO RV BASAL DIMENSION: 3.3 CM
ECHO RV FREE WALL PEAK S': 13 CM/S
ECHO RV INTERNAL DIMENSION: 3.1 CM
ECHO RV TAPSE: 2.1 CM (ref 1.7–?)
EOSINOPHIL # BLD: 0.3 K/UL (ref 0–0.8)
EOSINOPHIL NFR BLD: 2 % (ref 0.5–7.8)
ERYTHROCYTE [DISTWIDTH] IN BLOOD BY AUTOMATED COUNT: 12.6 % (ref 11.9–14.6)
EST. AVERAGE GLUCOSE BLD GHB EST-MCNC: 120 MG/DL
GLOBULIN SER CALC-MCNC: 3.2 G/DL (ref 2.3–3.5)
GLUCOSE SERPL-MCNC: 116 MG/DL (ref 70–99)
HBA1C MFR BLD: 5.8 % (ref 0–5.6)
HCT VFR BLD AUTO: 37.3 % (ref 41.1–50.3)
HDLC SERPL-MCNC: 29 MG/DL (ref 40–60)
HDLC SERPL: 3.3 (ref 0–5)
HGB BLD-MCNC: 12.5 G/DL (ref 13.6–17.2)
IMM GRANULOCYTES # BLD AUTO: 0.2 K/UL (ref 0–0.5)
IMM GRANULOCYTES NFR BLD AUTO: 1 % (ref 0–5)
LDLC SERPL CALC-MCNC: 51 MG/DL (ref 0–100)
LYMPHOCYTES # BLD: 1.4 K/UL (ref 0.5–4.6)
LYMPHOCYTES NFR BLD: 11 % (ref 13–44)
MAGNESIUM SERPL-MCNC: 1.9 MG/DL (ref 1.8–2.4)
MCH RBC QN AUTO: 30.9 PG (ref 26.1–32.9)
MCHC RBC AUTO-ENTMCNC: 33.5 G/DL (ref 31.4–35)
MCV RBC AUTO: 92.1 FL (ref 82–102)
MONOCYTES # BLD: 1.1 K/UL (ref 0.1–1.3)
MONOCYTES NFR BLD: 8 % (ref 4–12)
NEUTS SEG # BLD: 10.3 K/UL (ref 1.7–8.2)
NEUTS SEG NFR BLD: 77 % (ref 43–78)
NRBC # BLD: 0 K/UL (ref 0–0.2)
PLATELET # BLD AUTO: 373 K/UL (ref 150–450)
PMV BLD AUTO: 8.2 FL (ref 9.4–12.3)
POTASSIUM SERPL-SCNC: 4 MMOL/L (ref 3.5–5.1)
PROT SERPL-MCNC: 6.5 G/DL (ref 6.3–8.2)
RBC # BLD AUTO: 4.05 M/UL (ref 4.23–5.6)
SODIUM SERPL-SCNC: 131 MMOL/L (ref 136–145)
TRIGL SERPL-MCNC: 77 MG/DL (ref 0–150)
VLDLC SERPL CALC-MCNC: 15 MG/DL (ref 6–23)
WBC # BLD AUTO: 13.3 K/UL (ref 4.3–11.1)

## 2024-07-05 PROCEDURE — 2580000003 HC RX 258: Performed by: PSYCHIATRY & NEUROLOGY

## 2024-07-05 PROCEDURE — 80053 COMPREHEN METABOLIC PANEL: CPT

## 2024-07-05 PROCEDURE — 93306 TTE W/DOPPLER COMPLETE: CPT | Performed by: INTERNAL MEDICINE

## 2024-07-05 PROCEDURE — 6360000004 HC RX CONTRAST MEDICATION: Performed by: STUDENT IN AN ORGANIZED HEALTH CARE EDUCATION/TRAINING PROGRAM

## 2024-07-05 PROCEDURE — 93306 TTE W/DOPPLER COMPLETE: CPT

## 2024-07-05 PROCEDURE — 6360000002 HC RX W HCPCS: Performed by: STUDENT IN AN ORGANIZED HEALTH CARE EDUCATION/TRAINING PROGRAM

## 2024-07-05 PROCEDURE — 2580000003 HC RX 258: Performed by: STUDENT IN AN ORGANIZED HEALTH CARE EDUCATION/TRAINING PROGRAM

## 2024-07-05 PROCEDURE — 83036 HEMOGLOBIN GLYCOSYLATED A1C: CPT

## 2024-07-05 PROCEDURE — 6370000000 HC RX 637 (ALT 250 FOR IP): Performed by: STUDENT IN AN ORGANIZED HEALTH CARE EDUCATION/TRAINING PROGRAM

## 2024-07-05 PROCEDURE — 83735 ASSAY OF MAGNESIUM: CPT

## 2024-07-05 PROCEDURE — 1100000003 HC PRIVATE W/ TELEMETRY

## 2024-07-05 PROCEDURE — 99222 1ST HOSP IP/OBS MODERATE 55: CPT | Performed by: PSYCHIATRY & NEUROLOGY

## 2024-07-05 PROCEDURE — 97530 THERAPEUTIC ACTIVITIES: CPT

## 2024-07-05 PROCEDURE — 97161 PT EVAL LOW COMPLEX 20 MIN: CPT

## 2024-07-05 PROCEDURE — 70498 CT ANGIOGRAPHY NECK: CPT

## 2024-07-05 PROCEDURE — 36415 COLL VENOUS BLD VENIPUNCTURE: CPT

## 2024-07-05 PROCEDURE — 70496 CT ANGIOGRAPHY HEAD: CPT | Performed by: RADIOLOGY

## 2024-07-05 PROCEDURE — 6360000002 HC RX W HCPCS: Performed by: PSYCHIATRY & NEUROLOGY

## 2024-07-05 PROCEDURE — 70551 MRI BRAIN STEM W/O DYE: CPT

## 2024-07-05 PROCEDURE — 70498 CT ANGIOGRAPHY NECK: CPT | Performed by: RADIOLOGY

## 2024-07-05 PROCEDURE — 80061 LIPID PANEL: CPT

## 2024-07-05 PROCEDURE — 85025 COMPLETE CBC W/AUTO DIFF WBC: CPT

## 2024-07-05 RX ADMIN — ACETAMINOPHEN 650 MG: 325 TABLET ORAL at 22:11

## 2024-07-05 RX ADMIN — AMLODIPINE BESYLATE 2.5 MG: 5 TABLET ORAL at 10:06

## 2024-07-05 RX ADMIN — ASPIRIN 81 MG: 81 TABLET, COATED ORAL at 10:06

## 2024-07-05 RX ADMIN — ACETAMINOPHEN 650 MG: 325 TABLET ORAL at 04:06

## 2024-07-05 RX ADMIN — CLOPIDOGREL BISULFATE 75 MG: 75 TABLET ORAL at 10:06

## 2024-07-05 RX ADMIN — AMOXICILLIN AND CLAVULANATE POTASSIUM 1 TABLET: 875; 125 TABLET, FILM COATED ORAL at 20:11

## 2024-07-05 RX ADMIN — SODIUM CHLORIDE, PRESERVATIVE FREE 10 ML: 5 INJECTION INTRAVENOUS at 20:12

## 2024-07-05 RX ADMIN — AMOXICILLIN AND CLAVULANATE POTASSIUM 1 TABLET: 875; 125 TABLET, FILM COATED ORAL at 10:06

## 2024-07-05 RX ADMIN — SODIUM CHLORIDE 2 MG: 9 INJECTION INTRAMUSCULAR; INTRAVENOUS; SUBCUTANEOUS at 11:57

## 2024-07-05 RX ADMIN — ENOXAPARIN SODIUM 40 MG: 100 INJECTION SUBCUTANEOUS at 10:06

## 2024-07-05 RX ADMIN — PANTOPRAZOLE SODIUM 40 MG: 40 TABLET, DELAYED RELEASE ORAL at 04:06

## 2024-07-05 RX ADMIN — SODIUM CHLORIDE, PRESERVATIVE FREE 10 ML: 5 INJECTION INTRAVENOUS at 10:07

## 2024-07-05 RX ADMIN — IOPAMIDOL 60 ML: 755 INJECTION, SOLUTION INTRAVENOUS at 08:46

## 2024-07-05 RX ADMIN — ATORVASTATIN CALCIUM 80 MG: 80 TABLET, FILM COATED ORAL at 20:11

## 2024-07-05 ASSESSMENT — PAIN SCALES - GENERAL
PAINLEVEL_OUTOF10: 0
PAINLEVEL_OUTOF10: 0
PAINLEVEL_OUTOF10: 4
PAINLEVEL_OUTOF10: 3

## 2024-07-05 ASSESSMENT — PAIN DESCRIPTION - PAIN TYPE
TYPE: ACUTE PAIN
TYPE: CHRONIC PAIN

## 2024-07-05 ASSESSMENT — PAIN DESCRIPTION - ORIENTATION
ORIENTATION: RIGHT
ORIENTATION: RIGHT

## 2024-07-05 ASSESSMENT — PAIN DESCRIPTION - LOCATION
LOCATION: EAR
LOCATION: EAR

## 2024-07-05 ASSESSMENT — PAIN - FUNCTIONAL ASSESSMENT
PAIN_FUNCTIONAL_ASSESSMENT: ACTIVITIES ARE NOT PREVENTED
PAIN_FUNCTIONAL_ASSESSMENT: ACTIVITIES ARE NOT PREVENTED

## 2024-07-05 ASSESSMENT — PAIN DESCRIPTION - DESCRIPTORS
DESCRIPTORS: DISCOMFORT
DESCRIPTORS: ACHING;DISCOMFORT

## 2024-07-05 ASSESSMENT — PAIN DESCRIPTION - ONSET
ONSET: GRADUAL
ONSET: ON-GOING

## 2024-07-05 ASSESSMENT — PAIN DESCRIPTION - FREQUENCY
FREQUENCY: CONTINUOUS
FREQUENCY: CONTINUOUS

## 2024-07-05 NOTE — PROGRESS NOTES
Stroke Education provided to patient and the following topics were discussed    1. Patients personal risk factors for stroke are none    2. Warning signs of Stroke:        * Sudden numbness or weakness of the face, arm or leg, especially on one side of          The body            * Sudden confusion, trouble speaking or understanding        * Sudden trouble seeing in one or both eyes        * Sudden trouble walking, dizziness, loss of balance or coordination        * Sudden severe headache with no known cause      3. Importance of activation Emergency Medical Services ( 9-1-1 ) immediately if experience any warning signs of stroke.    4. Be sure and schedule a follow-up appointment with your primary care doctor or any specialists as instructed.     5. You must take medicine every day to treat your risk factors for stroke.  Be sure to take your medicines exactly as your doctor tells you: no more, no less.  Know what your medicines are for , what they do.  Anti-thrombotics /anticoagulants can help prevent strokes.  You are taking the following medicine(s)      6.  Smoking and second-hand smoke greatly increase your risk of stroke, cardiovascular disease and death. Smoking history never    7. Information provided was Stroke Handouts or Verbal Education    8. Documentation of teaching completed in Patient Education Activity and on Care Plan with teaching response noted?  yes

## 2024-07-05 NOTE — PLAN OF CARE
Problem: Discharge Planning  Goal: Discharge to home or other facility with appropriate resources  7/4/2024 2208 by Mandi Bauman RN  Outcome: Progressing  Flowsheets (Taken 7/4/2024 2000)  Discharge to home or other facility with appropriate resources: Identify barriers to discharge with patient and caregiver  7/4/2024 1613 by Fredrick Dodd RN  Outcome: Progressing     Problem: Pain  Goal: Verbalizes/displays adequate comfort level or baseline comfort level  7/4/2024 2208 by Mandi Bauman RN  Outcome: Progressing  7/4/2024 1613 by Fredrick Dodd RN  Outcome: Progressing     Problem: Neurosensory - Adult  Goal: Achieves stable or improved neurological status  Outcome: Progressing  Flowsheets (Taken 7/4/2024 2000)  Achieves stable or improved neurological status: Assess for and report changes in neurological status     Problem: Respiratory - Adult  Goal: Achieves optimal ventilation and oxygenation  Outcome: Progressing  Flowsheets (Taken 7/4/2024 2000)  Achieves optimal ventilation and oxygenation: Assess for changes in respiratory status     Problem: Cardiovascular - Adult  Goal: Maintains optimal cardiac output and hemodynamic stability  Outcome: Progressing  Flowsheets (Taken 7/4/2024 2000)  Maintains optimal cardiac output and hemodynamic stability: Monitor blood pressure and heart rate     Problem: Skin/Tissue Integrity - Adult  Goal: Skin integrity remains intact  Outcome: Progressing  Flowsheets (Taken 7/4/2024 2000)  Skin Integrity Remains Intact: Monitor for areas of redness and/or skin breakdown     Problem: Musculoskeletal - Adult  Goal: Return mobility to safest level of function  Outcome: Progressing  Flowsheets (Taken 7/4/2024 2000)  Return Mobility to Safest Level of Function: Assess patient stability and activity tolerance for standing, transferring and ambulating with or without assistive devices     Problem: Gastrointestinal - Adult  Goal: Minimal or absence of nausea and

## 2024-07-05 NOTE — PROGRESS NOTES
Hospitalist Progress Note   Admit Date:  2024 12:20 PM   Name:  Jeronimo Ortiz Sr.   Age:  79 y.o.  Sex:  male  :  1945   MRN:  [id number]   Room:  [facility]/    Presenting/Chief Complaint: No chief complaint on file.     Reason(s) for Admission: Stroke-like symptoms [R29.90]     Hospital Course:   Jeronimo Ortiz Sr. is a 79 y.o. male with medical history of CVA, skin cancer of the right ear, and hypertension who presented to the [place] ED on 2024 with cc of left arm weakness that started around 5PM on 7/3/24.  He had some improvement yesterday afternoon and overnight, but came to the ED due to persistent weakness.  He also had an episode of left arm weakness on  that lasted about 15 minutes before resolving completely.  In the ED, labs showed sodium 132, creatinine 0.62, WBC 12.8, and hemoglobin 13.6.  CT head showed left lacunar infarct but no acute normalities.  Neurology was consulted who recommended aspirin, Plavix 300 mg, and admission for further workup.    Subjective & 24hr Events:   Still pending CTA, mri brain anfd echo results.  Patient hard of hearing.  Both daughters at the bedside.  Based on physical exam explained that he likely has had a stroke.  Family reports that neurology agrees as well.  Explained depending on findings patient may require Plavix for 21 versus 90 days.  He surgery planned to excise malignancy from his ear in 2 weeks.  Explained to him it is recommended to postpone nonemergent surgeries until after the 90-day..    Assessment & Plan:     Strokelike symptoms  Left arm weakness  CT head showed no acute abnormalities  CTA without LVO, does show right bronchial obstruction-will order CT chest  MRI brain pending  ECHO-normal-structure noted in the liver, recommending liver ultrasound  ASA + plavix , atorvastatin  Consult neurology, appreciate recs  PT/OT     Hx skin cancer  Cellulitis of right ear (POA, due to E coli)  Seen in the ED on  due to  AM   Result Value Ref Range    Cholesterol, Total 96 0 - 200 MG/DL    Triglycerides 77 0 - 150 MG/DL    HDL 29 (L) 40 - 60 MG/DL    LDL Cholesterol 51 0 - 100 MG/DL    VLDL Cholesterol Calculated 15 6 - 23 MG/DL    Chol/HDL Ratio 3.3 0.0 - 5.0     Magnesium    Collection Time: 07/05/24  4:23 AM   Result Value Ref Range    Magnesium 1.9 1.8 - 2.4 mg/dL   Comprehensive Metabolic Panel    Collection Time: 07/05/24  4:23 AM   Result Value Ref Range    Sodium 131 (L) 136 - 145 mmol/L    Potassium 4.0 3.5 - 5.1 mmol/L    Chloride 96 (L) 98 - 107 mmol/L    CO2 21 20 - 28 mmol/L    Anion Gap 14 9 - 18 mmol/L    Glucose 116 (H) 70 - 99 mg/dL    BUN 14 8 - 23 MG/DL    Creatinine 0.68 (L) 0.80 - 1.30 MG/DL    Est, Glom Filt Rate >90 >60 ml/min/1.73m2    Calcium 8.9 8.8 - 10.2 MG/DL    Total Bilirubin 0.3 0.0 - 1.2 MG/DL    ALT 57 12 - 65 U/L    AST 33 15 - 37 U/L    Alk Phosphatase 84 40 - 129 U/L    Total Protein 6.5 6.3 - 8.2 g/dL    Albumin 3.3 3.2 - 4.6 g/dL    Globulin 3.2 2.3 - 3.5 g/dL    Albumin/Globulin Ratio 1.0 1.0 - 1.9     CBC with Auto Differential    Collection Time: 07/05/24  4:23 AM   Result Value Ref Range    WBC 13.3 (H) 4.3 - 11.1 K/uL    RBC 4.05 (L) 4.23 - 5.6 M/uL    Hemoglobin 12.5 (L) 13.6 - 17.2 g/dL    Hematocrit 37.3 (L) 41.1 - 50.3 %    MCV 92.1 82 - 102 FL    MCH 30.9 26.1 - 32.9 PG    MCHC 33.5 31.4 - 35.0 g/dL    RDW 12.6 11.9 - 14.6 %    Platelets 373 150 - 450 K/uL    MPV 8.2 (L) 9.4 - 12.3 FL    nRBC 0.00 0.0 - 0.2 K/uL    Differential Type AUTOMATED      Neutrophils % 77 43 - 78 %    Lymphocytes % 11 (L) 13 - 44 %    Monocytes % 8 4.0 - 12.0 %    Eosinophils % 2 0.5 - 7.8 %    Basophils % 1 0.0 - 2.0 %    Immature Granulocytes % 1 0.0 - 5.0 %    Neutrophils Absolute 10.3 (H) 1.7 - 8.2 K/UL    Lymphocytes Absolute 1.4 0.5 - 4.6 K/UL    Monocytes Absolute 1.1 0.1 - 1.3 K/UL    Eosinophils Absolute 0.3 0.0 - 0.8 K/UL    Basophils Absolute 0.1 0.0 - 0.2 K/UL    Immature Granulocytes Absolute 0.2  ml/m2    LA Volume Index MOD A4C 16 16 - 34 ml/m2    LA Size Index 1.55 cm/m2    LA/AO Root Ratio 1.03     Ao Root Index 1.50 cm/m2    Ascending Aorta Index 1.55 cm/m2    AV Velocity Ratio 0.94     LVOT:AV VTI Index 0.89     FELI/BSA VTI 1.2 cm2/m2    FELI/BSA Peak Velocity 1.3 cm2/m2       Current Meds:  Current Facility-Administered Medications   Medication Dose Route Frequency    sodium chloride flush 0.9 % injection 5-40 mL  5-40 mL IntraVENous 2 times per day    sodium chloride flush 0.9 % injection 5-40 mL  5-40 mL IntraVENous PRN    0.9 % sodium chloride infusion   IntraVENous PRN    ondansetron (ZOFRAN-ODT) disintegrating tablet 4 mg  4 mg Oral Q8H PRN    Or    ondansetron (ZOFRAN) injection 4 mg  4 mg IntraVENous Q6H PRN    polyethylene glycol (GLYCOLAX) packet 17 g  17 g Oral Daily PRN    atorvastatin (LIPITOR) tablet 80 mg  80 mg Oral Nightly    clopidogrel (PLAVIX) tablet 75 mg  75 mg Oral Daily    aspirin EC tablet 81 mg  81 mg Oral Daily    labetalol (NORMODYNE;TRANDATE) injection 10 mg  10 mg IntraVENous Q6H PRN    amoxicillin-clavulanate (AUGMENTIN) 875-125 MG per tablet 1 tablet  1 tablet Oral 2 times per day    amLODIPine (NORVASC) tablet 2.5 mg  2.5 mg Oral Daily    morphine (MSIR) tablet 7.5 mg  7.5 mg Oral Q6H PRN    pantoprazole (PROTONIX) tablet 40 mg  40 mg Oral QAM AC    enoxaparin (LOVENOX) injection 40 mg  40 mg SubCUTAneous Daily    acetaminophen (TYLENOL) tablet 650 mg  650 mg Oral Q4H PRN       Signed:  Maria De Jesus Bonner MD    Part of this note may have been written by using a voice dictation software.  The note has been proof read but may still contain some grammatical/other typographical errors.

## 2024-07-05 NOTE — CARE COORDINATION
CM screened chart this AM for noted admitting diagnosis of \"stroke-like symptoms.\"  CM to room to see patient and/or family, however, patient is off the floor for testing.  No family at bedside.  Therapy evals/recs pending.  No recent encounters noted in EPIC.  No PCP listed.  CM will continue to try to meet with patient in order to assist with any necessary and agreed upon transitions of care planning.        07/05/24 0930   Service Assessment   Patient Orientation Alert and Oriented   Cognition Alert   History Provided By Medical Record   Primary Caregiver Self   Accompanied By/Relationship No one at bedside   Support Systems Children   Patient's Healthcare Decision Maker is: Legal Next of Kin   PCP Verified by CM No   Prior Functional Level Other (see comment)  (Awaiting therapy evals/recs)   Current Functional Level Other (see comment)  (Awaiting therapy evals/recs)   Can patient return to prior living arrangement Unknown at present   Ability to make needs known: Good   Family able to assist with home care needs: Other (comment)  (Need to confirm with patient)   Would you like for me to discuss the discharge plan with any other family members/significant others, and if so, who? No  (WIll await discussion with patient)   Financial Resources Medicare   Community Resources None   CM/SW Referral No PCP;Other (see comment)  (Code S protocol)

## 2024-07-05 NOTE — PROGRESS NOTES
Occupational Therapy Note:    Attempted to see patient this AM for occupational therapy treatment  session. Patient off floor. Will follow and re-attempt as schedule permits/patient available. Thank you,  Janine Grant, OT     Rehab Caseload Tracker

## 2024-07-05 NOTE — CONSULTS
Neurology Consult Note       History: This is an 79-year-old man who very recently experienced left upper extremity weakness, worse distally.  This lasted for short while and then went away.  Later, he had another episode which lasted about 8 hours and is now improving, but he continues to have weakness in the left hand.    Scheduled Meds:   sodium chloride flush  5-40 mL IntraVENous 2 times per day    atorvastatin  80 mg Oral Nightly    clopidogrel  75 mg Oral Daily    aspirin  81 mg Oral Daily    amoxicillin-clavulanate  1 tablet Oral 2 times per day    amLODIPine  2.5 mg Oral Daily    pantoprazole  40 mg Oral QAM AC    enoxaparin  40 mg SubCUTAneous Daily     Continuous Infusions:   sodium chloride       PRN Meds:.sodium chloride flush, sodium chloride, ondansetron **OR** ondansetron, polyethylene glycol, labetalol, LORazepam (ATIVAN) 1 mg in sodium chloride (PF) 0.9 % 10 mL injection, morphine, acetaminophen    Past Medical History:   Diagnosis Date    Cerebral artery occlusion with cerebral infarction (HCC)     COPD (chronic obstructive pulmonary disease) (HCC)     Hypertension     Skin cancer            Exam: Pertinent positives and negatives include:      General - Well developed, well nourished, in no apparent distress. Pleasant and conversant.   HEENT - Normocephalic, atraumatic. Conjunctiva, tympanic membranes, and oropharynx are clear.   Neck - Supple without masses, no bruits   Cardiovascular - Regular rate and rhythm. Normal S1, S2 without murmurs, rubs, or gallops.  Lungs - Clear to auscultation.  Abdomen - Soft, nontender with normal bowel sounds.   Extremities - Peripheral pulses intact. No edema and no rashes.     Neurological examination - Comprehension, attention , memory and reasoning are intact. Language and speech are normal. On cranial nerve examination pupils are equal round and reactive to light. Fundoscopic examination is normal. Visual acuity is adequate. Visual fields are full to finger

## 2024-07-05 NOTE — PROGRESS NOTES
MRI screening form complete, waiting on pt signature. Computers would not allow it to be completed at this time.

## 2024-07-05 NOTE — PROGRESS NOTES
ACUTE PHYSICAL THERAPY GOALS:   (Developed with and agreed upon by patient and/or caregiver.)    LTG:  (1.)Mr. Ortiz will move from supine to sit and sit to supine , scoot up and down, and roll side to side in bed with INDEPENDENT within 7 treatment day(s).    (2.)Mr. Ortiz will transfer from bed to chair and chair to bed with INDEPENDENT using the least restrictive device within 7 treatment day(s).    (3.)Mr. Ortiz will ambulate with INDEPENDENT for 500 feet with the least restrictive device within 7 treatment day(s).  (4.)Mr. Ortiz will tolerate at least 23 min of dynamic standing activity to assist standing ADLs with the least restrictive device within 7 treatment days.      ________________________________________________________________________________________________      PHYSICAL THERAPY Initial Assessment, Daily Note, and PM  (Link to Caseload Tracking: PT Visit Days : 1  Acknowledge Orders  Time In/Out  PT Charge Capture  Rehab Caseload Tracker    Jeronimo Ortiz . is a 79 y.o. male   PRIMARY DIAGNOSIS: Stroke-like symptoms  Stroke-like symptoms [R29.90]       Reason for Referral: Generalized Muscle Weakness (M62.81)  Other lack of cordination (R27.8)  Difficulty in walking, Not elsewhere classified (R26.2)  Other abnormalities of gait and mobility (R26.89)  Inpatient: Payor: MEDICARE / Plan: MEDICARE PART A AND B / Product Type: *No Product type* /     ASSESSMENT:     REHAB RECOMMENDATIONS:   Recommendation to date pending progress:  Setting:  Outpatient physical therapy for balance    Equipment:    To Be Determined     ASSESSMENT:  Mr. Ortiz presents in supine, agreeable to session. He is [place] and demonstrates decreased coordination with finger to nose testing in L UE.      Upon entering, Pnt is agreeable to PT treatment.  he reports no pain at rest. Pnt performed supine > sit with CGA, sitting EOB with good sitting balance control. He dons socks independently w/ extra time at EOB.  Sit >  LINES / DRAINS:   Pre Treatment: 0         Post Treatment: 0 Vitals        Oxygen      {LDA:31438}    RESTRICTIONS/PRECAUTIONS:                    GROSS EVALUATION:  Intact Impaired (Comments):   AROM []   Decreased bilateral hip extension   PROM []    Strength []  Decreased L wrist and elbow extensors   Balance []     Posture [] {Posture:60098::\"N/A\"}   Sensation []     Coordination []      Tone []     Edema []    Activity Tolerance []      []      COGNITION/  PERCEPTION: Intact Impaired (Comments):   Orientation []     Vision []     Hearing []     Cognition  []       MOBILITY: I Mod I S SBA CGA Min Mod Max Total  NT x2 Comments:   Bed Mobility    Rolling [] [] [] [] [] [] [] [] [] [] []    Supine to Sit [] [] [] [] [] [] [] [] [] [] []    Scooting [] [] [] [] [] [] [] [] [] [] []    Sit to Supine [] [] [] [] [] [] [] [] [] [] []    Transfers    Sit to Stand [] [] [] [] [] [] [] [] [] [] []    Bed to Chair [] [] [] [] [] [] [] [] [] [] []    Stand to Sit [] [] [] [] [] [] [] [] [] [] []     [] [] [] [] [] [] [] [] [] [] []    I=Independent, Mod I=Modified Independent, S=Supervision, SBA=Standby Assistance, CGA=Contact Guard Assistance,   Min=Minimal Assistance, Mod=Moderate Assistance, Max=Maximal Assistance, Total=Total Assistance, NT=Not Tested    GAIT: I Mod I S SBA CGA Min Mod Max Total  NT x2 Comments:   Level of Assistance [] [] [] [] [] [] [] [] [] [] []    Distance   feet    DME {Assistive Device:98026}    Gait Quality {Gait Quality:60997}    Weightbearing Status      Stairs      I=Independent, Mod I=Modified Independent, S=Supervision, SBA=Standby Assistance, CGA=Contact Guard Assistance,   Min=Minimal Assistance, Mod=Moderate Assistance, Max=Maximal Assistance, Total=Total Assistance, NT=Not Tested    PLAN:   FREQUENCY AND DURATION: 3 times/week for duration of hospital stay or until stated goals are met, whichever comes first.    THERAPY PROGNOSIS: {Prognosis:56434}    PROBLEM LIST:   (Skilled  intervention is medically necessary to address:)  {Problem List:83733} INTERVENTIONS PLANNED:   (Benefits and precautions of physical therapy have been discussed with the patient.)  {Interventions Planned:68329}       TREATMENT:   EVALUATION: {Complexity:37210}: (Untimed Charge)  The initial evaluation charge encompasses clinical chart review, objective assessment, interpretation of assessment, and skilled monitoring of the patient's response to treatment in order to develop a plan of care.     TREATMENT:   {Treatment:22137}    TREATMENT GRID:  {Grid:49292}    AFTER TREATMENT PRECAUTIONS: {After Treatment Precautions:81857}    INTERDISCIPLINARY COLLABORATION:  {Interdisciplinary Collaboration:70315}    EDUCATION:      TIME IN/OUT:  Time In: 1321  Time Out: 1334  Minutes: 13    Leticia West, PT     [Follow-Up - Clinic] : a clinic follow-up of [Coronary Artery Disease] : coronary artery disease [Hyperlipidemia] : hyperlipidemia [Hypertension] : hypertension [Peripheral Vascular Disease] : peripheral vascular disease [FreeTextEntry1] : I saw this 60-year-old man in followup on  12/12/18\par  He is  more than 7years post myocardial infarction with stenting. He has had no recurrence. His blood pressure is   well controlled, and says it is normal when he sees his primary care doctor,  a few weeks ago. his lipid profile is not as good as before, so I had  increase the Atorvastatin to 80mg. Now I will add Fenofibrate.\par He has no cardiac symptoms.\par A recent Stress test showed a small area of ischemia, but he was asymptomatic

## 2024-07-05 NOTE — PLAN OF CARE
Problem: Discharge Planning  Goal: Discharge to home or other facility with appropriate resources  Outcome: Progressing     Problem: Pain  Goal: Verbalizes/displays adequate comfort level or baseline comfort level  Outcome: Progressing     Problem: Neurosensory - Adult  Goal: Achieves stable or improved neurological status  Outcome: Progressing     Problem: Respiratory - Adult  Goal: Achieves optimal ventilation and oxygenation  Outcome: Progressing     Problem: Cardiovascular - Adult  Goal: Maintains optimal cardiac output and hemodynamic stability  Outcome: Progressing     Problem: Skin/Tissue Integrity - Adult  Goal: Skin integrity remains intact  Outcome: Progressing     Problem: Musculoskeletal - Adult  Goal: Return mobility to safest level of function  Outcome: Progressing     Problem: Gastrointestinal - Adult  Goal: Minimal or absence of nausea and vomiting  Outcome: Progressing     Problem: Genitourinary - Adult  Goal: Absence of urinary retention  Outcome: Progressing     Problem: Infection - Adult  Goal: Absence of infection at discharge  Outcome: Progressing     Problem: Metabolic/Fluid and Electrolytes - Adult  Goal: Electrolytes maintained within normal limits  Outcome: Progressing  Goal: Hemodynamic stability and optimal renal function maintained  Outcome: Progressing  Goal: Glucose maintained within prescribed range  Outcome: Progressing     Problem: Hematologic - Adult  Goal: Maintains hematologic stability  Outcome: Progressing

## 2024-07-05 NOTE — PROGRESS NOTES
Physical Therapy Note:    Attempted to see patient this AM for physical therapy evaluation session. Patient off floor. Will follow and re-attempt as schedule permits/patient available. Thank you,    Leticia West, PT     Rehab Caseload Tracker

## 2024-07-06 ENCOUNTER — APPOINTMENT (OUTPATIENT)
Dept: ULTRASOUND IMAGING | Age: 79
End: 2024-07-06
Attending: STUDENT IN AN ORGANIZED HEALTH CARE EDUCATION/TRAINING PROGRAM
Payer: MEDICARE

## 2024-07-06 ENCOUNTER — APPOINTMENT (OUTPATIENT)
Dept: CT IMAGING | Age: 79
End: 2024-07-06
Attending: STUDENT IN AN ORGANIZED HEALTH CARE EDUCATION/TRAINING PROGRAM
Payer: MEDICARE

## 2024-07-06 VITALS
DIASTOLIC BLOOD PRESSURE: 54 MMHG | RESPIRATION RATE: 21 BRPM | SYSTOLIC BLOOD PRESSURE: 114 MMHG | TEMPERATURE: 97.7 F | HEART RATE: 76 BPM | OXYGEN SATURATION: 95 %

## 2024-07-06 PROBLEM — I63.9 CEREBROVASCULAR ACCIDENT (CVA) (HCC): Status: ACTIVE | Noted: 2024-07-06

## 2024-07-06 PROBLEM — R91.8 ENDOBRONCHIAL MASS: Status: ACTIVE | Noted: 2024-07-06

## 2024-07-06 LAB
ALBUMIN SERPL-MCNC: 3.1 G/DL (ref 3.2–4.6)
ANION GAP SERPL CALC-SCNC: 11 MMOL/L (ref 9–18)
BASOPHILS # BLD: 0.1 K/UL (ref 0–0.2)
BASOPHILS NFR BLD: 1 % (ref 0–2)
BUN SERPL-MCNC: 13 MG/DL (ref 8–23)
CALCIUM SERPL-MCNC: 8.8 MG/DL (ref 8.8–10.2)
CHLORIDE SERPL-SCNC: 98 MMOL/L (ref 98–107)
CO2 SERPL-SCNC: 23 MMOL/L (ref 20–28)
CREAT SERPL-MCNC: 0.75 MG/DL (ref 0.8–1.3)
DIFFERENTIAL METHOD BLD: ABNORMAL
EOSINOPHIL # BLD: 0.3 K/UL (ref 0–0.8)
EOSINOPHIL NFR BLD: 3 % (ref 0.5–7.8)
ERYTHROCYTE [DISTWIDTH] IN BLOOD BY AUTOMATED COUNT: 12.4 % (ref 11.9–14.6)
GLUCOSE SERPL-MCNC: 99 MG/DL (ref 70–99)
HCT VFR BLD AUTO: 33.9 % (ref 41.1–50.3)
HGB BLD-MCNC: 11.9 G/DL (ref 13.6–17.2)
IMM GRANULOCYTES # BLD AUTO: 0.2 K/UL (ref 0–0.5)
IMM GRANULOCYTES NFR BLD AUTO: 1 % (ref 0–5)
LYMPHOCYTES # BLD: 1.2 K/UL (ref 0.5–4.6)
LYMPHOCYTES NFR BLD: 11 % (ref 13–44)
MCH RBC QN AUTO: 31.4 PG (ref 26.1–32.9)
MCHC RBC AUTO-ENTMCNC: 35.1 G/DL (ref 31.4–35)
MCV RBC AUTO: 89.4 FL (ref 82–102)
MONOCYTES # BLD: 0.9 K/UL (ref 0.1–1.3)
MONOCYTES NFR BLD: 8 % (ref 4–12)
NEUTS SEG # BLD: 8.4 K/UL (ref 1.7–8.2)
NEUTS SEG NFR BLD: 76 % (ref 43–78)
NRBC # BLD: 0 K/UL (ref 0–0.2)
PHOSPHATE SERPL-MCNC: 3.3 MG/DL (ref 2.5–4.5)
PLATELET # BLD AUTO: 355 K/UL (ref 150–450)
PMV BLD AUTO: 8.1 FL (ref 9.4–12.3)
POTASSIUM SERPL-SCNC: 3.9 MMOL/L (ref 3.5–5.1)
RBC # BLD AUTO: 3.79 M/UL (ref 4.23–5.6)
SODIUM SERPL-SCNC: 133 MMOL/L (ref 136–145)
WBC # BLD AUTO: 11.1 K/UL (ref 4.3–11.1)

## 2024-07-06 PROCEDURE — 2580000003 HC RX 258: Performed by: INTERNAL MEDICINE

## 2024-07-06 PROCEDURE — 86606 ASPERGILLUS ANTIBODY: CPT

## 2024-07-06 PROCEDURE — 80069 RENAL FUNCTION PANEL: CPT

## 2024-07-06 PROCEDURE — 76705 ECHO EXAM OF ABDOMEN: CPT

## 2024-07-06 PROCEDURE — 6370000000 HC RX 637 (ALT 250 FOR IP): Performed by: INTERNAL MEDICINE

## 2024-07-06 PROCEDURE — 6360000004 HC RX CONTRAST MEDICATION: Performed by: PSYCHIATRY & NEUROLOGY

## 2024-07-06 PROCEDURE — 82785 ASSAY OF IGE: CPT

## 2024-07-06 PROCEDURE — 6360000002 HC RX W HCPCS: Performed by: STUDENT IN AN ORGANIZED HEALTH CARE EDUCATION/TRAINING PROGRAM

## 2024-07-06 PROCEDURE — 94640 AIRWAY INHALATION TREATMENT: CPT

## 2024-07-06 PROCEDURE — 94760 N-INVAS EAR/PLS OXIMETRY 1: CPT

## 2024-07-06 PROCEDURE — 71260 CT THORAX DX C+: CPT | Performed by: RADIOLOGY

## 2024-07-06 PROCEDURE — 85025 COMPLETE CBC W/AUTO DIFF WBC: CPT

## 2024-07-06 PROCEDURE — 99223 1ST HOSP IP/OBS HIGH 75: CPT | Performed by: INTERNAL MEDICINE

## 2024-07-06 PROCEDURE — 36415 COLL VENOUS BLD VENIPUNCTURE: CPT

## 2024-07-06 PROCEDURE — 6370000000 HC RX 637 (ALT 250 FOR IP): Performed by: STUDENT IN AN ORGANIZED HEALTH CARE EDUCATION/TRAINING PROGRAM

## 2024-07-06 PROCEDURE — 2580000003 HC RX 258: Performed by: STUDENT IN AN ORGANIZED HEALTH CARE EDUCATION/TRAINING PROGRAM

## 2024-07-06 PROCEDURE — 71260 CT THORAX DX C+: CPT

## 2024-07-06 RX ORDER — ATORVASTATIN CALCIUM 80 MG/1
80 TABLET, FILM COATED ORAL NIGHTLY
Qty: 30 TABLET | Refills: 3 | Status: SHIPPED | OUTPATIENT
Start: 2024-07-06

## 2024-07-06 RX ORDER — ASPIRIN 81 MG/1
81 TABLET ORAL DAILY
Qty: 30 TABLET | Refills: 3 | Status: SHIPPED | OUTPATIENT
Start: 2024-07-07

## 2024-07-06 RX ORDER — GUAIFENESIN 600 MG/1
600 TABLET, EXTENDED RELEASE ORAL 2 TIMES DAILY
Status: DISCONTINUED | OUTPATIENT
Start: 2024-07-06 | End: 2024-07-06 | Stop reason: HOSPADM

## 2024-07-06 RX ORDER — CLOPIDOGREL BISULFATE 75 MG/1
75 TABLET ORAL DAILY
Qty: 30 TABLET | Refills: 0 | Status: SHIPPED | OUTPATIENT
Start: 2024-07-06 | End: 2024-08-05

## 2024-07-06 RX ORDER — LISINOPRIL AND HYDROCHLOROTHIAZIDE 25; 20 MG/1; MG/1
1 TABLET ORAL DAILY
Qty: 30 TABLET | Refills: 3 | Status: SHIPPED | OUTPATIENT
Start: 2024-07-06

## 2024-07-06 RX ORDER — GUAIFENESIN 600 MG/1
600 TABLET, EXTENDED RELEASE ORAL 2 TIMES DAILY
Qty: 20 TABLET | Refills: 0 | Status: SHIPPED | OUTPATIENT
Start: 2024-07-06 | End: 2024-07-16

## 2024-07-06 RX ORDER — SODIUM CHLORIDE FOR INHALATION 3 %
4 VIAL, NEBULIZER (ML) INHALATION 2 TIMES DAILY
Status: DISCONTINUED | OUTPATIENT
Start: 2024-07-06 | End: 2024-07-06 | Stop reason: HOSPADM

## 2024-07-06 RX ORDER — AMOXICILLIN AND CLAVULANATE POTASSIUM 875; 125 MG/1; MG/1
1 TABLET, FILM COATED ORAL EVERY 12 HOURS SCHEDULED
Qty: 10 TABLET | Refills: 0 | Status: SHIPPED | OUTPATIENT
Start: 2024-07-06 | End: 2024-07-11

## 2024-07-06 RX ORDER — SODIUM CHLORIDE FOR INHALATION 3 %
4 VIAL, NEBULIZER (ML) INHALATION 2 TIMES DAILY
Qty: 240 ML | Refills: 0 | Status: SHIPPED | OUTPATIENT
Start: 2024-07-06 | End: 2024-08-05

## 2024-07-06 RX ADMIN — GUAIFENESIN 600 MG: 600 TABLET ORAL at 14:37

## 2024-07-06 RX ADMIN — SODIUM CHLORIDE, PRESERVATIVE FREE 10 ML: 5 INJECTION INTRAVENOUS at 10:33

## 2024-07-06 RX ADMIN — ACETAMINOPHEN 650 MG: 325 TABLET ORAL at 05:59

## 2024-07-06 RX ADMIN — IOPAMIDOL 100 ML: 755 INJECTION, SOLUTION INTRAVENOUS at 09:49

## 2024-07-06 RX ADMIN — ACETAMINOPHEN 650 MG: 325 TABLET ORAL at 12:47

## 2024-07-06 RX ADMIN — PANTOPRAZOLE SODIUM 40 MG: 40 TABLET, DELAYED RELEASE ORAL at 05:35

## 2024-07-06 RX ADMIN — AMLODIPINE BESYLATE 2.5 MG: 5 TABLET ORAL at 10:33

## 2024-07-06 RX ADMIN — IPRATROPIUM BROMIDE 0.5 MG: 0.5 SOLUTION RESPIRATORY (INHALATION) at 15:21

## 2024-07-06 RX ADMIN — ENOXAPARIN SODIUM 40 MG: 100 INJECTION SUBCUTANEOUS at 10:32

## 2024-07-06 RX ADMIN — SODIUM CHLORIDE SOLN NEBU 3% 4 ML: 3 NEBU SOLN at 15:21

## 2024-07-06 RX ADMIN — AMOXICILLIN AND CLAVULANATE POTASSIUM 1 TABLET: 875; 125 TABLET, FILM COATED ORAL at 10:33

## 2024-07-06 RX ADMIN — ASPIRIN 81 MG: 81 TABLET, COATED ORAL at 10:33

## 2024-07-06 ASSESSMENT — PAIN DESCRIPTION - DESCRIPTORS
DESCRIPTORS: ACHING;DISCOMFORT;SORE
DESCRIPTORS: ACHING

## 2024-07-06 ASSESSMENT — PAIN DESCRIPTION - ONSET: ONSET: ON-GOING

## 2024-07-06 ASSESSMENT — PAIN - FUNCTIONAL ASSESSMENT: PAIN_FUNCTIONAL_ASSESSMENT: ACTIVITIES ARE NOT PREVENTED

## 2024-07-06 ASSESSMENT — PAIN DESCRIPTION - PAIN TYPE: TYPE: CHRONIC PAIN

## 2024-07-06 ASSESSMENT — PAIN SCALES - GENERAL
PAINLEVEL_OUTOF10: 2
PAINLEVEL_OUTOF10: 5
PAINLEVEL_OUTOF10: 6

## 2024-07-06 ASSESSMENT — PAIN DESCRIPTION - FREQUENCY: FREQUENCY: CONTINUOUS

## 2024-07-06 ASSESSMENT — PAIN DESCRIPTION - ORIENTATION
ORIENTATION: RIGHT
ORIENTATION: RIGHT

## 2024-07-06 ASSESSMENT — PAIN DESCRIPTION - LOCATION
LOCATION: EAR
LOCATION: EAR

## 2024-07-06 NOTE — PLAN OF CARE
Problem: Discharge Planning  Goal: Discharge to home or other facility with appropriate resources  7/5/2024 2155 by Samantha Leal RN  Outcome: Progressing  Flowsheets (Taken 7/5/2024 2000)  Discharge to home or other facility with appropriate resources:   Identify barriers to discharge with patient and caregiver   Arrange for needed discharge resources and transportation as appropriate  7/5/2024 1647 by Fredrick Dodd RN  Outcome: Progressing     Problem: Pain  Goal: Verbalizes/displays adequate comfort level or baseline comfort level  7/5/2024 2155 by Samantha Leal RN  Outcome: Progressing  7/5/2024 1647 by Fredrick Dodd RN  Outcome: Progressing     Problem: Neurosensory - Adult  Goal: Achieves stable or improved neurological status  7/5/2024 2155 by Samantha Leal RN  Outcome: Progressing  7/5/2024 1647 by Fredrick Dodd RN  Outcome: Progressing     Problem: Respiratory - Adult  Goal: Achieves optimal ventilation and oxygenation  7/5/2024 2155 by Samantha Leal RN  Outcome: Progressing  7/5/2024 1647 by Fredrick Dodd RN  Outcome: Progressing     Problem: Cardiovascular - Adult  Goal: Maintains optimal cardiac output and hemodynamic stability  7/5/2024 2155 by Samantha Leal RN  Outcome: Progressing  Flowsheets (Taken 7/5/2024 2000)  Maintains optimal cardiac output and hemodynamic stability:   Monitor blood pressure and heart rate   Monitor urine output and notify Licensed Independent Practitioner for values outside of normal range   Assess for signs of decreased cardiac output  7/5/2024 1647 by Fredrick Dodd RN  Outcome: Progressing     Problem: Skin/Tissue Integrity - Adult  Goal: Skin integrity remains intact  7/5/2024 2155 by Samantha Leal RN  Outcome: Progressing  Flowsheets (Taken 7/5/2024 2000)  Skin Integrity Remains Intact: Monitor for areas of redness and/or skin breakdown  7/5/2024 1647 by Fredrick Dodd RN  Outcome: Progressing     Problem: Musculoskeletal - Adult  Goal:

## 2024-07-06 NOTE — DISCHARGE INSTRUCTIONS
Please hold your lisinopril-hydrochlorothiazide medicine as your blood pressure was controlled without it. I am writing a prescription for clopidogrel (plavix) but do not take it until instructed by Dr. Loera  (pulmonology) or Dr. Rao (neurology).   You will get a call from neurology and pulmonology offices to set up appointments, I have attached their information just in case. Please go to Dr. Caldwell's office at 9 am for a consultation. The address is attached. Let them know Dr. Caldwell spoke to Dr. Bonner at [facility] on 7/6 afternoon and he agreed to see you regarding your ear.  A referral has been sent for you to establish a primary care provider here.     Get well soon !

## 2024-07-06 NOTE — PROGRESS NOTES
Neurology Progress Note       The patient was off the floor getting a CT scan of the chest and ultrasound of the liver.  Family was at bedside.  MRI confirms an infarct in the right frontal lobe.  There is also concerning signs for malignancy into his lungs and liver which are being further investigated.    Recommendations  Continue aspirin.  I will hold Plavix in case the patient needs a biopsy.    10% of strokes are caused by cancer.  If he does have a confirmed malignancy then his stroke is likely from a hypercoagulable state and would warrant anticoagulation.  If this ends up being the case, then stop aspirin and start Eliquis 5 mg twice daily for secondary stroke prevention in the setting of malignancy.    Further recommendations to follow

## 2024-07-06 NOTE — CONSULTS
the lungs/liver.  Further evaluation with bronchial biopsy or bronchial washout may benefit diagnosis.     Principal Problem:    Stroke-like symptoms    Left arm weakness    Acute infarct, right frontal lobe  Plan:   Neurololgy Recommendations 7/6/24:  \"Continue aspirin.  I will hold Plavix in case the patient needs a biopsy of lungs/liver.     10% of strokes are caused by cancer.  If he does have a confirmed malignancy then his stroke is likely from a hypercoagulable state and would warrant anticoagulation.  If this ends up being the case, then stop aspirin and start Eliquis 5 mg twice daily for secondary stroke prevention in the setting of malignancy\".    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 Coli)  -Scheduled for surgery for further excision 7/17 in [place]. It was recommended he delay it until after DAPT. It is very painful and he does not wish to delay treatment if not necessary.   -Perhaps since Plavix is held for further pulmonary malignancy workup, surgical evaluation/treatment (general surgery or plastics?) could be considered at the same time.   -May be able to be done as out patient.   -Family is interested in moving pt from [place] to [place] & changing treatment teams to a facility in the [facility].   Hepatic lesion  -noted on echo  -US ordered & shows the following  IMPRESSION:  1. Multiple simple  appearing cysts are seen in the left liver.  2. Common bile duct is mildly dilated measuring 7 mm. No intrahepatic biliary  ductal dilatation.  COPD (chronic obstructive pulmonary disease) (HCC)  Plan:   -DuoNebs PRN  -Triotropium (Spiriva)      History of cerebrovascular accident  Plan:   -DAPT (Aspirin, Plavix), and atorvastatin      Full Code    Thank you very much for this referral.  We appreciate the opportunity to participate in this patient's care.  Will follow along with above stated plan.    In this split/shared evaluation I performed performed a medically appropriate history and exam, counseled and educated the patient and/or family member, ordered medications, tests or procedures, documented information in EMR, and coordinated care. which accounted for 28 minutes clinical time.     Anju Fagan Head, FNP      In this split/shared evaluation I performed reviewed the patients's H&P, available images, labs, cultures., discussed case in detail with NPP, performed a medically appropriate history and exam, counseled and educated the patient and/or family member, ordered and/or reviewed medications, tests or procedures, documented information in EMR, independently interpreted images, and coordinated care. which accounted for 30 minutes clinical time.     Impression:   Pt with a R ear, fungating cancer that has been biopsied in [place] but unknown results.   Getting larger as time progresses and seems to be taking a [facility].     Also now admitted with CVA, incidentally found to have RUL endobronchial abnormalities on CT scan.   Hard to tell if this is tumor obscuring the airway or potentially a non malignant process such as ABPA. Ultimately needs bronchoscopy to diagnose.   If ok with neurology would cont to hold plavix and will look to get him schedule for bronch with possible endobronchial biopsies toward the end of the coming week.   Will check Ige and aspergillus ab. No real

## 2024-07-06 NOTE — CARE COORDINATION
Pt is for discharge home today with family.  Referrals sent for a new [facility] PCP and  Out Pt PT and OT.  Pt will be contacted by  to coordinate appointments.  No needs/supportive care orders recieved for MSW at this time.     07/06/24 7956   Service Assessment   Patient's Healthcare Decision Maker is: Legal Next of Kin   Services At/After Discharge   Transition of Care Consult (CM Consult) Discharge Planning;Other   Services At/After Discharge PT;OT;Outpatient  (referral to  Out pt PT and OT, [facility] PCP referral)    Resource Information Provided? No   Mode of Transport at Discharge Other (see comment)  (family)   Confirm Follow Up Transport Family   Condition of Participation: Discharge Planning   The Plan for Transition of Care is related to the following treatment goals: Pt will return home with referrals for out pt PT and OT.   The Patient and/or Patient Representative was provided with a Choice of Provider? Patient   The Patient and/Or Patient Representative agree with the Discharge Plan? Yes   Freedom of Choice list was provided with basic dialogue that supports the patient's individualized plan of care/goals, treatment preferences, and shares the quality data associated with the providers?  Yes

## 2024-07-06 NOTE — DISCHARGE SUMMARY
Hospitalist Discharge Summary   Admit Date:  2024 12:20 PM   DC Note date: 2024  Name:  Jeronimo Ortiz Sr.   Age:  79 y.o.  Sex:  male  :  1945   MRN:  [id number]   Room:  [facility]  PCP:  Unknown, Provider, JAIRO Sanchez NP    Presenting Complaint: No chief complaint on file.     Initial Admission Diagnosis: Stroke-like symptoms [R29.90]     Problem List for this Hospitalization (present on admission):    Principal Problem:    Stroke-like symptoms  Active Problems:    Left arm weakness    Cellulitis of helix of right ear    Hypertension    Hyperlipidemia    COPD (chronic obstructive pulmonary disease) (HCC)    Skin cancer    GERD (gastroesophageal reflux disease)    History of cerebrovascular accident    Cerebrovascular accident (CVA) (HCC)    Endobronchial mass  Resolved Problems:    * No resolved hospital problems. *      Hospital Course:  Jeronimo Ortiz Sr. is a 79 y.o. male with medical history of CVA, skin cancer of the right ear, and hypertension who presented to the [place] ED on 2024 with cc of left arm weakness that started around 5PM on 7/3/24.  He had some improvement yesterday afternoon and overnight, but came to the ED due to persistent weakness.  He also had an episode of left arm weakness on  that lasted about 15 minutes before resolving completely.  In the ED, labs showed sodium 132, creatinine 0.62, WBC 12.8, and hemoglobin 13.6.  CT head showed left lacunar infarct but no acute normalities.  Neurology was consulted who recommended aspirin, Plavix 300 mg, and admission for further workup.  CTA without LVO but did note right upper lobe bronchus obstruction.  MRI brain Right frontal lobe cortical infarct in addition to the lung mass. Echocardiogram with normal EF, normal systolic function, normal diastolic function but did note questionable structure in the liver.  Ultrasound abdomen confirmed simple cyst in the liver.  Common bile duct was reported as mildly dilated at  16 (!) 105/46 95 %   07/06/24 0000 97.3 °F (36.3 °C) 74 16 (!) 104/55 91 %   07/05/24 2000 97.6 °F (36.4 °C) 89 18 (!) 117/54 94 %       Oxygen Therapy  SpO2: 95 %  Pulse Oximeter Device Mode: Intermittent  O2 Device: None (Room air)    Estimated body mass index is 29.51 kg/m² as calculated from the following:    Height as of an earlier encounter on 7/4/24: 1.753 m (5' 9.02\").    Weight as of an earlier encounter on 7/4/24: 90.7 kg (199 lb 15.3 oz).  No intake or output data in the 24 hours ending 07/06/24 1656      Physical Exam:  General:          Well nourished.  Elderly, [place]  Head:               Normocephalic, atraumatic  Eyes:               Sclerae appear normal.  Pupils equally round.  ENT:                Nares appear normal.  Moist oral mucosa, lesion noted on right ear  Neck:               No restricted ROM.  Trachea midline   CV:                  RRR.  No m/r/g.  No jugular venous distension.  Lungs:             CTAB.  No wheezing, rhonchi, or rales.  Symmetric expansion.  Abdomen:        Soft, nontender, nondistended.  Extremities:     No cyanosis or clubbing.  No edema  Skin:                No rashes and normal coloration.   Warm and dry.    Neuro:             CN II-XII grossly intact.  4/5 strength in the left upper and left lower extremity, sensation mildly diminished  Psych:             Normal mood and affect.      Signed:  Maria De Jesus Bonner MD    Part of this note may have been written by using a voice dictation software.  The note has been proof read but may still contain some grammatical/other typographical errors.

## 2024-07-07 DIAGNOSIS — I63.9 ACUTE CVA (CEREBROVASCULAR ACCIDENT) (HCC): Primary | ICD-10-CM

## 2024-07-08 ENCOUNTER — PREP FOR PROCEDURE (OUTPATIENT)
Dept: PULMONOLOGY | Age: 79
End: 2024-07-08

## 2024-07-08 ENCOUNTER — TELEPHONE (OUTPATIENT)
Dept: PULMONOLOGY | Age: 79
End: 2024-07-08

## 2024-07-08 DIAGNOSIS — R93.89 ABNORMAL CHEST CT: ICD-10-CM

## 2024-07-08 NOTE — TELEPHONE ENCOUNTER
----- Message from Michael Loera MD sent at 7/7/2024  9:11 AM EDT -----  Pt seen during admission over the weekend for CVA. Found to have endobronchial abnormality. Can we try to set him up for outpt bronchoscopy later this week.   Last dose of plavix was 7/5 or maybe morning of 7/6 and need to be held 5-7 days.     Michael Loera MD    I have spoken with patient's daughter, Stephanie.  She prefers 7/16 for procedure with Dr Loera.  She will have patient at main admitting on 7/16 at 0930.  He will be NPO with exception of lisinopril and Norvasc with a small sip of water.  He will have an adult with him for transportation. Offered 7/11 but she is attempting to obtain [facility] benefits for patient.  She reports that she was instructed for patient to stay off Plavix at this time by MD.

## 2024-07-09 RX ORDER — SODIUM CHLORIDE 9 MG/ML
INJECTION, SOLUTION INTRAVENOUS PRN
Status: CANCELLED | OUTPATIENT
Start: 2024-07-09

## 2024-07-09 RX ORDER — SODIUM CHLORIDE 0.9 % (FLUSH) 0.9 %
5-40 SYRINGE (ML) INJECTION PRN
Status: CANCELLED | OUTPATIENT
Start: 2024-07-09

## 2024-07-09 RX ORDER — SODIUM CHLORIDE 0.9 % (FLUSH) 0.9 %
5-40 SYRINGE (ML) INJECTION EVERY 12 HOURS SCHEDULED
Status: CANCELLED | OUTPATIENT
Start: 2024-07-09

## 2024-07-10 LAB — IGE SERPL-ACNC: 26 IU/ML (ref 6–495)

## 2024-07-11 LAB
A FLAVUS AB SER QL ID: NEGATIVE
A FUMIGATUS AB SER QL ID: NEGATIVE
A NIGER AB SER QL ID: NEGATIVE

## 2024-07-15 NOTE — PROGRESS NOTES
Physician Progress Note      PATIENT:               DAVID LERMA  CSN #:                  [id number]  :                       1945  ADMIT DATE:       2024 12:20 PM  DISCH DATE:        2024 5:40 PM  RESPONDING  PROVIDER #:        Maria De Jesus Douglas MD          QUERY TEXT:    Pt admitted with CVA.  Noted documentation of lung mass concerning for   malignancy. Planning biopsy as outpatient. After study, can the patient's   concerning condition be further specified as:    The medical record reflects the following:    Risk Factors: hx of skin cancer  Clinical Indicators: CT chest: Abnormal mass occluding the right upper lobe   bronchus  Treatment: Pulmonary consult, plans for outpatient bronchoscopy with biopsy    Thank you!    Eber Nava, BSN,RN, CRCR  RN Clinical   Options provided:  -- Suspected lung cancer  -- Clinically insignificant radiology finding only  -- Other - I will add my own diagnosis  -- Disagree - Not applicable / Not valid  -- Disagree - Clinically unable to determine / Unknown  -- Refer to Clinical Documentation Reviewer    PROVIDER RESPONSE TEXT:    This patient has suspected lung cancer.    Query created by: Eber Nava on 7/15/2024 1:36 PM      Electronically signed by:  Maria De Jesus Douglas MD 7/15/2024 3:54 PM

## 2024-07-16 ENCOUNTER — TELEPHONE (OUTPATIENT)
Dept: PULMONOLOGY | Age: 79
End: 2024-07-16

## 2024-07-16 NOTE — TELEPHONE ENCOUNTER
Shoshana Chowdhury Debra L, RCP  PER [facility] PATIENT NO SHOWED. I have removed this case from the schedule. Please let us know within the next 3 days if you would like to reschedule or cancel this procedure. Thank you.    Left message on VM of Stephanie for return call.  Unable to contact patient.

## 2024-08-01 NOTE — TELEPHONE ENCOUNTER
Able to reach his daughter Stephanie, reports patient has gone back to [place], she is trying to get him to come back to [place] to complete appts and have f/u care. She is aware we remain available and will call us if further needs.

## 2024-10-08 ENCOUNTER — OFFICE VISIT (OUTPATIENT)
Dept: NEUROLOGY | Age: 79
End: 2024-10-08
Payer: MEDICARE

## 2024-10-08 VITALS
BODY MASS INDEX: 27.7 KG/M2 | WEIGHT: 187 LBS | SYSTOLIC BLOOD PRESSURE: 116 MMHG | HEART RATE: 92 BPM | HEIGHT: 69 IN | DIASTOLIC BLOOD PRESSURE: 84 MMHG | OXYGEN SATURATION: 96 %

## 2024-10-08 DIAGNOSIS — I69.30 HISTORY OF STROKE WITH RESIDUAL DEFICIT: Primary | ICD-10-CM

## 2024-10-08 DIAGNOSIS — I69.398 DEPRESSION AS LATE EFFECT OF CEREBROVASCULAR ACCIDENT (CVA): ICD-10-CM

## 2024-10-08 DIAGNOSIS — Z87.891 FORMER TOBACCO USE: ICD-10-CM

## 2024-10-08 DIAGNOSIS — M72.0 DUPUYTRENS CONTRACTURE: ICD-10-CM

## 2024-10-08 DIAGNOSIS — R29.898 LEFT ARM WEAKNESS: ICD-10-CM

## 2024-10-08 DIAGNOSIS — F06.31 DEPRESSION AS LATE EFFECT OF CEREBROVASCULAR ACCIDENT (CVA): ICD-10-CM

## 2024-10-08 DIAGNOSIS — R91.8 ENDOBRONCHIAL MASS: ICD-10-CM

## 2024-10-08 DIAGNOSIS — C44.222 SQUAMOUS CELL CARCINOMA OF SKIN OF RIGHT EAR AND EXTERNAL AURICULAR CANAL: ICD-10-CM

## 2024-10-08 PROBLEM — R29.90 STROKE-LIKE SYMPTOMS: Status: RESOLVED | Noted: 2024-07-04 | Resolved: 2024-10-08

## 2024-10-08 PROBLEM — M10.9 GOUT: Status: ACTIVE | Noted: 2024-10-08

## 2024-10-08 PROBLEM — H90.3 SENSORINEURAL HEARING LOSS, BILATERAL: Status: ACTIVE | Noted: 2024-10-08

## 2024-10-08 PROBLEM — M19.90 OSTEOARTHRITIS: Status: ACTIVE | Noted: 2024-10-08

## 2024-10-08 PROBLEM — I63.9 CEREBROVASCULAR ACCIDENT (CVA) (HCC): Status: RESOLVED | Noted: 2024-07-06 | Resolved: 2024-10-08

## 2024-10-08 PROCEDURE — 3074F SYST BP LT 130 MM HG: CPT | Performed by: NURSE PRACTITIONER

## 2024-10-08 PROCEDURE — 3079F DIAST BP 80-89 MM HG: CPT | Performed by: NURSE PRACTITIONER

## 2024-10-08 PROCEDURE — 99204 OFFICE O/P NEW MOD 45 MIN: CPT | Performed by: NURSE PRACTITIONER

## 2024-10-08 PROCEDURE — G8427 DOCREV CUR MEDS BY ELIG CLIN: HCPCS | Performed by: NURSE PRACTITIONER

## 2024-10-08 PROCEDURE — G8419 CALC BMI OUT NRM PARAM NOF/U: HCPCS | Performed by: NURSE PRACTITIONER

## 2024-10-08 PROCEDURE — 1123F ACP DISCUSS/DSCN MKR DOCD: CPT | Performed by: NURSE PRACTITIONER

## 2024-10-08 PROCEDURE — G8482 FLU IMMUNIZE ORDER/ADMIN: HCPCS | Performed by: NURSE PRACTITIONER

## 2024-10-08 PROCEDURE — 1036F TOBACCO NON-USER: CPT | Performed by: NURSE PRACTITIONER

## 2024-10-08 RX ORDER — ESCITALOPRAM OXALATE 10 MG/1
10 TABLET ORAL DAILY
Qty: 30 TABLET | Refills: 0 | Status: SHIPPED | OUTPATIENT
Start: 2024-10-08

## 2024-10-08 RX ORDER — ATORVASTATIN CALCIUM 40 MG/1
40 TABLET, FILM COATED ORAL DAILY
Qty: 30 TABLET | Refills: 0 | Status: SHIPPED | OUTPATIENT
Start: 2024-10-08

## 2024-10-08 RX ORDER — ESCITALOPRAM OXALATE 10 MG/1
10 TABLET ORAL DAILY
Qty: 90 TABLET | Refills: 1 | Status: SHIPPED | OUTPATIENT
Start: 2024-11-07

## 2024-10-08 RX ORDER — ATORVASTATIN CALCIUM 40 MG/1
40 TABLET, FILM COATED ORAL DAILY
Qty: 90 TABLET | Refills: 1 | Status: SHIPPED | OUTPATIENT
Start: 2024-11-07

## 2024-10-08 ASSESSMENT — PATIENT HEALTH QUESTIONNAIRE - PHQ9
2. FEELING DOWN, DEPRESSED OR HOPELESS: NOT AT ALL
SUM OF ALL RESPONSES TO PHQ QUESTIONS 1-9: 0
SUM OF ALL RESPONSES TO PHQ QUESTIONS 1-9: 0
1. LITTLE INTEREST OR PLEASURE IN DOING THINGS: NOT AT ALL
SUM OF ALL RESPONSES TO PHQ QUESTIONS 1-9: 0
SUM OF ALL RESPONSES TO PHQ9 QUESTIONS 1 & 2: 0
SUM OF ALL RESPONSES TO PHQ QUESTIONS 1-9: 0

## 2024-10-08 ASSESSMENT — ENCOUNTER SYMPTOMS
WHEEZING: 0
ALLERGIC/IMMUNOLOGIC NEGATIVE: 1
SHORTNESS OF BREATH: 1
EYES NEGATIVE: 1
GASTROINTESTINAL NEGATIVE: 1
TROUBLE SWALLOWING: 0
VOICE CHANGE: 0

## 2024-10-08 NOTE — PROGRESS NOTES
reduction/dose modulation techniques, as appropriate the performed exam, including the following:   Automated Exposure Control; Adjustment of the mA and/or kV according to patient size (this includes techniques or standardized protocols for targeted exams where dose is matched to indication/reason for exam); and Use of Iterative Reconstruction Technique.     Comparison: None.     Findings:     Lungs: Moderate to severe emphysema. There is obstruction of the right upper  lobe bronchus. This may simply represent mucous plugging however endobronchial mass is not excluded. There is peripheral lung opacities which may represent postobstructive atelectasis. Consider pulmonary consultation to further evaluate.  Soft Tissues: Small lymph nodes in the mediastinum are indeterminate  Cervical Spine: Degenerative changes  Aorta: Conventional 3 vessel arch  Great Vessels: Patent     Right ICA: Hard plaque of the bulb and the cavernous sinus  % Stenosis: 0  Right ECA: Patent  Right MCA: Patent  Right SANTI: Patent     Left ICA: Hard plaque of the bulb and cavernous sinus  % Stenosis: 0  Left ECA:  Patent  Left MCA: Patent  Left SANTI: The A1 segment is atretic     Right Vertebral: Small caliber vessel which ends in PICA  Left Vertebral: Patent  Dominance: Left  Basilar: Patent  Right PCA: Patent  Left PCA: Patent     Other Vascular: Bilateral fetal origin of the PCAs        IMPRESSION:  1. No evidence of large vessel occlusion.  2. No significant ICA stenosis.  3. Right upper lobe bronchus is obstructed. While this may simply represent  mucous plugging, an endobronchial mass is not excluded. Pulmonary consultation is recommended       CT CHEST WO CONTRAST 09/12/2024    Narrative  EXAM: CT CHEST : NON-CONTRAST.    HISTORY: R91.8 Other nonspecific abnormal finding of lung field I10;;Lung mass and lymphadenopathy    COMPARISON: 7/6/2024 CT chest    TECHNIQUE: Volume acquisition of the chest was obtained from the thoracic inlet to the

## 2024-10-12 ENCOUNTER — HOSPITAL ENCOUNTER (EMERGENCY)
Age: 79
Discharge: HOME | End: 2024-10-12
Attending: EMERGENCY MEDICINE
Payer: OTHER GOVERNMENT

## 2024-10-12 ENCOUNTER — APPOINTMENT (OUTPATIENT)
Dept: GENERAL RADIOLOGY | Age: 79
End: 2024-10-12
Payer: OTHER GOVERNMENT

## 2024-10-12 VITALS
WEIGHT: 187 LBS | HEART RATE: 71 BPM | RESPIRATION RATE: 17 BRPM | BODY MASS INDEX: 27.7 KG/M2 | OXYGEN SATURATION: 93 % | TEMPERATURE: 97.4 F | HEIGHT: 69 IN | DIASTOLIC BLOOD PRESSURE: 51 MMHG | SYSTOLIC BLOOD PRESSURE: 115 MMHG

## 2024-10-12 DIAGNOSIS — N39.0 URINARY TRACT INFECTION WITHOUT HEMATURIA, SITE UNSPECIFIED: Primary | ICD-10-CM

## 2024-10-12 LAB
ALBUMIN SERPL-MCNC: 3.1 G/DL (ref 3.2–4.6)
ALBUMIN/GLOB SERPL: 0.7 (ref 0.4–1.6)
ALP SERPL-CCNC: 93 U/L (ref 45–117)
ALT SERPL-CCNC: 60 U/L (ref 13–61)
ANION GAP SERPL CALC-SCNC: 13 MMOL/L (ref 9–18)
APPEARANCE UR: CLEAR
AST SERPL-CCNC: 54 U/L (ref 15–37)
BACTERIA URNS QL MICRO: ABNORMAL /HPF
BASOPHILS # BLD: 0 K/UL (ref 0–0.2)
BASOPHILS NFR BLD: 1 % (ref 0–2)
BILIRUB SERPL-MCNC: 0.6 MG/DL (ref 0.2–1.1)
BILIRUB UR QL: ABNORMAL
BUN SERPL-MCNC: 15 MG/DL (ref 8–23)
CALCIUM SERPL-MCNC: 8.5 MG/DL (ref 8.3–10.4)
CHLORIDE SERPL-SCNC: 101 MMOL/L (ref 98–107)
CO2 SERPL-SCNC: 20 MMOL/L (ref 21–32)
COLOR UR: ABNORMAL
CREAT SERPL-MCNC: 0.57 MG/DL (ref 0.8–1.5)
DIFFERENTIAL METHOD BLD: ABNORMAL
EKG ATRIAL RATE: 88 BPM
EKG DIAGNOSIS: NORMAL
EKG P AXIS: 79 DEGREES
EKG P-R INTERVAL: 164 MS
EKG Q-T INTERVAL: 369 MS
EKG QRS DURATION: 99 MS
EKG QTC CALCULATION (BAZETT): 444 MS
EKG R AXIS: 67 DEGREES
EKG T AXIS: 72 DEGREES
EKG VENTRICULAR RATE: 87 BPM
EOSINOPHIL # BLD: 0.1 K/UL (ref 0–0.8)
EOSINOPHIL NFR BLD: 1 % (ref 0.5–7.8)
EPI CELLS #/AREA URNS HPF: ABNORMAL /HPF
ERYTHROCYTE [DISTWIDTH] IN BLOOD BY AUTOMATED COUNT: 16.4 % (ref 11.9–14.6)
GLOBULIN SER CALC-MCNC: 4.2 G/DL (ref 2.8–4.5)
GLUCOSE SERPL-MCNC: 105 MG/DL (ref 65–100)
GLUCOSE UR STRIP.AUTO-MCNC: NEGATIVE MG/DL
HCT VFR BLD AUTO: 33.3 % (ref 41.1–50.3)
HGB BLD-MCNC: 10.8 G/DL (ref 13.6–17.2)
HGB UR QL STRIP: ABNORMAL
IMM GRANULOCYTES # BLD AUTO: 0.1 K/UL (ref 0–0.5)
IMM GRANULOCYTES NFR BLD AUTO: 1 % (ref 0–5)
KETONES UR QL STRIP.AUTO: 80 MG/DL
LEUKOCYTE ESTERASE UR QL STRIP.AUTO: NEGATIVE
LYMPHOCYTES # BLD: 1.1 K/UL (ref 0.5–4.6)
LYMPHOCYTES NFR BLD: 13 % (ref 13–44)
MAGNESIUM SERPL-MCNC: 2 MG/DL (ref 1.2–2.6)
MCH RBC QN AUTO: 27.1 PG (ref 26.1–32.9)
MCHC RBC AUTO-ENTMCNC: 32.4 G/DL (ref 31.4–35)
MCV RBC AUTO: 83.7 FL (ref 82–102)
MONOCYTES # BLD: 0.6 K/UL (ref 0.1–1.3)
MONOCYTES NFR BLD: 7 % (ref 4–12)
MUCOUS THREADS URNS QL MICRO: ABNORMAL /LPF
NEUTS SEG # BLD: 6.9 K/UL (ref 1.7–8.2)
NEUTS SEG NFR BLD: 79 % (ref 43–78)
NITRITE UR QL STRIP.AUTO: NEGATIVE
NRBC # BLD: 0 K/UL (ref 0–0.2)
OTHER OBSERVATIONS: ABNORMAL
PH UR STRIP: 6 (ref 5–9)
PLATELET # BLD AUTO: 277 K/UL (ref 150–450)
PMV BLD AUTO: 8 FL (ref 9.4–12.3)
POTASSIUM SERPL-SCNC: 3.7 MMOL/L (ref 3.5–5.1)
PROT SERPL-MCNC: 7.3 G/DL (ref 6.4–8.2)
PROT UR STRIP-MCNC: 30 MG/DL
RBC # BLD AUTO: 3.98 M/UL (ref 4.23–5.6)
RBC #/AREA URNS HPF: ABNORMAL /HPF
SODIUM SERPL-SCNC: 134 MMOL/L (ref 133–143)
SP GR UR REFRACTOMETRY: 1.02 (ref 1–1.02)
UROBILINOGEN UR QL STRIP.AUTO: 4 EU/DL (ref 0.2–1)
WBC # BLD AUTO: 8.8 K/UL (ref 4.3–11.1)
WBC URNS QL MICRO: ABNORMAL /HPF

## 2024-10-12 PROCEDURE — 71045 X-RAY EXAM CHEST 1 VIEW: CPT

## 2024-10-12 PROCEDURE — 81001 URINALYSIS AUTO W/SCOPE: CPT

## 2024-10-12 PROCEDURE — 96361 HYDRATE IV INFUSION ADD-ON: CPT

## 2024-10-12 PROCEDURE — 93005 ELECTROCARDIOGRAM TRACING: CPT | Performed by: EMERGENCY MEDICINE

## 2024-10-12 PROCEDURE — 99285 EMERGENCY DEPT VISIT HI MDM: CPT

## 2024-10-12 PROCEDURE — 85025 COMPLETE CBC W/AUTO DIFF WBC: CPT

## 2024-10-12 PROCEDURE — 96374 THER/PROPH/DIAG INJ IV PUSH: CPT

## 2024-10-12 PROCEDURE — 80053 COMPREHEN METABOLIC PANEL: CPT

## 2024-10-12 PROCEDURE — 96375 TX/PRO/DX INJ NEW DRUG ADDON: CPT

## 2024-10-12 PROCEDURE — 2580000003 HC RX 258: Performed by: EMERGENCY MEDICINE

## 2024-10-12 PROCEDURE — 6360000002 HC RX W HCPCS: Performed by: EMERGENCY MEDICINE

## 2024-10-12 PROCEDURE — 83735 ASSAY OF MAGNESIUM: CPT

## 2024-10-12 RX ORDER — KETOROLAC TROMETHAMINE 10 MG/1
10 TABLET, FILM COATED ORAL EVERY 6 HOURS PRN
Qty: 20 TABLET | Refills: 0 | Status: SHIPPED | OUTPATIENT
Start: 2024-10-12

## 2024-10-12 RX ORDER — CEFDINIR 300 MG/1
300 CAPSULE ORAL 2 TIMES DAILY
Qty: 14 CAPSULE | Refills: 0 | Status: SHIPPED | OUTPATIENT
Start: 2024-10-12 | End: 2024-10-19

## 2024-10-12 RX ORDER — 0.9 % SODIUM CHLORIDE 0.9 %
1000 INTRAVENOUS SOLUTION INTRAVENOUS ONCE
Status: COMPLETED | OUTPATIENT
Start: 2024-10-12 | End: 2024-10-12

## 2024-10-12 RX ORDER — KETOROLAC TROMETHAMINE 15 MG/ML
15 INJECTION, SOLUTION INTRAMUSCULAR; INTRAVENOUS ONCE
Status: COMPLETED | OUTPATIENT
Start: 2024-10-12 | End: 2024-10-12

## 2024-10-12 RX ADMIN — KETOROLAC TROMETHAMINE 15 MG: 15 INJECTION, SOLUTION INTRAMUSCULAR; INTRAVENOUS at 13:04

## 2024-10-12 RX ADMIN — SODIUM CHLORIDE 1000 ML: 9 INJECTION, SOLUTION INTRAVENOUS at 12:36

## 2024-10-12 RX ADMIN — WATER 1000 MG: 1 INJECTION INTRAMUSCULAR; INTRAVENOUS; SUBCUTANEOUS at 12:37

## 2024-10-12 ASSESSMENT — PAIN DESCRIPTION - LOCATION
LOCATION: RIB CAGE
LOCATION: RIB CAGE

## 2024-10-12 ASSESSMENT — PAIN DESCRIPTION - DESCRIPTORS: DESCRIPTORS: STABBING

## 2024-10-12 ASSESSMENT — PAIN DESCRIPTION - ORIENTATION
ORIENTATION: LEFT
ORIENTATION: LEFT

## 2024-10-12 ASSESSMENT — PAIN SCALES - GENERAL
PAINLEVEL_OUTOF10: 2
PAINLEVEL_OUTOF10: 4
PAINLEVEL_OUTOF10: 2

## 2024-10-12 ASSESSMENT — PAIN - FUNCTIONAL ASSESSMENT: PAIN_FUNCTIONAL_ASSESSMENT: 0-10

## 2024-10-12 NOTE — ED TRIAGE NOTES
Pt presents to ED with c/o cough and shortness of breath since July. Recent lung mass/ cancer diagnosis. Per family patient has been declining in the past couple of weeks. No appetite. Pt c/o pain to right side ribs from coughing.

## 2024-10-12 NOTE — ED NOTES
Patient mobility status  with no difficulty. Provider aware     I have reviewed discharge instructions with the patient.  The patient verbalized understanding.    Patient left ED via Discharge Method: wheelchair to Home with Child.    Opportunity for questions and clarification provided.     Patient given 2 scripts.

## 2024-10-13 NOTE — ED PROVIDER NOTES
Albumin/Globulin Ratio 0.7 0.4 - 1.6     Magnesium   Result Value Ref Range    Magnesium 2.0 1.2 - 2.6 mg/dL   Urinalysis   Result Value Ref Range    Color, UA DIETER      Appearance CLEAR      Specific Gravity, UA 1.025 (H) 1.001 - 1.023      pH, Urine 6.0 5.0 - 9.0      Protein, UA 30 (A) NEG mg/dL    Glucose, Ur Negative NEG mg/dL    Ketones, Urine 80 (A) NEG mg/dL    Bilirubin, Urine MODERATE (A) NEG      Blood, Urine Trace Intact (A) NEG      Urobilinogen, Urine 4.0 (H) 0.2 - 1.0 EU/dL    Nitrite, Urine Negative NEG      Leukocyte Esterase, Urine Negative NEG     Urinalysis, Micro   Result Value Ref Range    WBC, UA 3-5 0 /hpf    RBC, UA 0-3 0 /hpf    Epithelial Cells, UA 0-3 0 /hpf    BACTERIA, URINE 3+ (H) 0 /hpf    Mucus, UA 2+ (H) 0 /lpf    Other observations RESULTS VERIFIED MANUALLY     EKG 12 Lead   Result Value Ref Range    Ventricular Rate 87 BPM    Atrial Rate 88 BPM    P-R Interval 164 ms    QRS Duration 99 ms    Q-T Interval 369 ms    QTc Calculation (Bazett) 444 ms    P Axis 79 degrees    R Axis 67 degrees    T Axis 72 degrees    Diagnosis       Sinus rhythm  Consider left atrial enlargement  Baseline wander in lead(s) V4    Confirmed by LILY RANGEL (), LOVE CROUCH ([id number]) on 10/12/2024 2:07:51 PM           XR CHEST PORTABLE   Final Result   No acute findings in the chest. Chronic right upper lobe infiltrate   and neoplastic etiology would remain within differential consideration.         Electronically signed by Alonso Strickland                   No results for input(s): \"COVID19\" in the last 72 hours.     Voice dictation software was used during the making of this note.  This software is not perfect and grammatical and other typographical errors may be present.  This note has not been completely proofread for errors.     Thang Celis MD  10/13/24 [id number]

## 2024-10-30 DIAGNOSIS — I69.398 DEPRESSION AS LATE EFFECT OF CEREBROVASCULAR ACCIDENT (CVA): ICD-10-CM

## 2024-10-30 DIAGNOSIS — F06.31 DEPRESSION AS LATE EFFECT OF CEREBROVASCULAR ACCIDENT (CVA): ICD-10-CM

## 2024-10-30 DIAGNOSIS — I69.30 HISTORY OF STROKE WITH RESIDUAL DEFICIT: ICD-10-CM

## 2024-10-30 RX ORDER — ESCITALOPRAM OXALATE 10 MG/1
10 TABLET ORAL DAILY
Qty: 90 TABLET | Refills: 1 | OUTPATIENT
Start: 2024-10-30

## 2024-10-30 RX ORDER — ATORVASTATIN CALCIUM 40 MG/1
40 TABLET, FILM COATED ORAL DAILY
Qty: 90 TABLET | Refills: 1 | OUTPATIENT
Start: 2024-10-30

## 2024-11-03 DIAGNOSIS — I69.30 HISTORY OF STROKE WITH RESIDUAL DEFICIT: ICD-10-CM

## 2024-11-04 RX ORDER — ATORVASTATIN CALCIUM 40 MG/1
40 TABLET, FILM COATED ORAL DAILY
Qty: 30 TABLET | Refills: 0 | OUTPATIENT
Start: 2024-11-04

## 2024-11-13 ENCOUNTER — HOSPITAL ENCOUNTER (INPATIENT)
Age: 79
LOS: 3 days | Discharge: ANOTHER HOSPITAL | DRG: 064 | End: 2024-11-16
Attending: EMERGENCY MEDICINE | Admitting: FAMILY MEDICINE
Payer: OTHER GOVERNMENT

## 2024-11-13 ENCOUNTER — APPOINTMENT (OUTPATIENT)
Dept: GENERAL RADIOLOGY | Age: 79
DRG: 064 | End: 2024-11-13
Payer: OTHER GOVERNMENT

## 2024-11-13 DIAGNOSIS — R41.82 ALTERED MENTAL STATUS, UNSPECIFIED ALTERED MENTAL STATUS TYPE: ICD-10-CM

## 2024-11-13 DIAGNOSIS — R78.81 BACTEREMIA: Primary | ICD-10-CM

## 2024-11-13 PROBLEM — E87.1 HYPONATREMIA: Status: ACTIVE | Noted: 2024-11-13

## 2024-11-13 PROBLEM — C34.91 BRONCHOGENIC CANCER OF RIGHT LUNG (HCC): Status: ACTIVE | Noted: 2024-10-16

## 2024-11-13 PROBLEM — G93.41 ACUTE METABOLIC ENCEPHALOPATHY: Status: ACTIVE | Noted: 2024-11-13

## 2024-11-13 LAB
ALBUMIN SERPL-MCNC: 2.7 G/DL (ref 3.2–4.6)
ALBUMIN/GLOB SERPL: 0.5 (ref 1–1.9)
ALP SERPL-CCNC: 112 U/L (ref 40–129)
ALT SERPL-CCNC: 65 U/L (ref 8–55)
ANION GAP SERPL CALC-SCNC: 16 MMOL/L (ref 7–16)
APPEARANCE UR: CLEAR
AST SERPL-CCNC: 51 U/L (ref 15–37)
BACTERIA URNS QL MICRO: ABNORMAL /HPF
BASOPHILS # BLD: 0 K/UL (ref 0–0.2)
BASOPHILS NFR BLD: 0 % (ref 0–2)
BILIRUB SERPL-MCNC: 0.5 MG/DL (ref 0–1.2)
BILIRUB UR QL: NEGATIVE
BUN SERPL-MCNC: 18 MG/DL (ref 8–23)
CALCIUM SERPL-MCNC: 8.8 MG/DL (ref 8.8–10.2)
CASTS URNS QL MICRO: 0 /LPF
CHLORIDE SERPL-SCNC: 92 MMOL/L (ref 98–107)
CO2 SERPL-SCNC: 19 MMOL/L (ref 20–29)
COLOR UR: ABNORMAL
CREAT SERPL-MCNC: 0.83 MG/DL (ref 0.8–1.3)
CRYSTALS URNS QL MICRO: 0 /LPF
DIFFERENTIAL METHOD BLD: ABNORMAL
EOSINOPHIL # BLD: 0 K/UL (ref 0–0.8)
EOSINOPHIL NFR BLD: 0 % (ref 0.5–7.8)
EPI CELLS #/AREA URNS HPF: ABNORMAL /HPF
ERYTHROCYTE [DISTWIDTH] IN BLOOD BY AUTOMATED COUNT: 17.6 % (ref 11.9–14.6)
GLOBULIN SER CALC-MCNC: 5.1 G/DL (ref 2.3–3.5)
GLUCOSE SERPL-MCNC: 150 MG/DL (ref 70–99)
GLUCOSE UR STRIP.AUTO-MCNC: NEGATIVE MG/DL
HCT VFR BLD AUTO: 32.7 % (ref 41.1–50.3)
HGB BLD-MCNC: 10.3 G/DL (ref 13.6–17.2)
HGB UR QL STRIP: ABNORMAL
IMM GRANULOCYTES # BLD AUTO: 0.1 K/UL (ref 0–0.5)
IMM GRANULOCYTES NFR BLD AUTO: 1 % (ref 0–5)
KETONES UR QL STRIP.AUTO: NEGATIVE MG/DL
LACTATE SERPL-SCNC: 1.5 MMOL/L (ref 0.5–2)
LACTATE SERPL-SCNC: 2.5 MMOL/L (ref 0.5–2)
LEUKOCYTE ESTERASE UR QL STRIP.AUTO: ABNORMAL
LYMPHOCYTES # BLD: 1.9 K/UL (ref 0.5–4.6)
LYMPHOCYTES NFR BLD: 14 % (ref 13–44)
MAGNESIUM SERPL-MCNC: 1.9 MG/DL (ref 1.8–2.4)
MCH RBC QN AUTO: 25.7 PG (ref 26.1–32.9)
MCHC RBC AUTO-ENTMCNC: 31.5 G/DL (ref 31.4–35)
MCV RBC AUTO: 81.5 FL (ref 82–102)
MONOCYTES # BLD: 0.6 K/UL (ref 0.1–1.3)
MONOCYTES NFR BLD: 4 % (ref 4–12)
MUCOUS THREADS URNS QL MICRO: 0 /LPF
NEUTS SEG # BLD: 10.5 K/UL (ref 1.7–8.2)
NEUTS SEG NFR BLD: 80 % (ref 43–78)
NITRITE UR QL STRIP.AUTO: NEGATIVE
NRBC # BLD: 0 K/UL (ref 0–0.2)
PH UR STRIP: 6.5 (ref 5–9)
PLATELET # BLD AUTO: 342 K/UL (ref 150–450)
PMV BLD AUTO: 8.6 FL (ref 9.4–12.3)
POTASSIUM SERPL-SCNC: 4.4 MMOL/L (ref 3.5–5.1)
PROCALCITONIN SERPL-MCNC: 0.38 NG/ML (ref 0–0.1)
PROT SERPL-MCNC: 7.8 G/DL (ref 6.3–8.2)
PROT UR STRIP-MCNC: 30 MG/DL
RBC # BLD AUTO: 4.01 M/UL (ref 4.23–5.6)
RBC #/AREA URNS HPF: ABNORMAL /HPF
SODIUM SERPL-SCNC: 127 MMOL/L (ref 136–145)
SP GR UR REFRACTOMETRY: 1.02 (ref 1–1.02)
URINE CULTURE IF INDICATED: ABNORMAL
UROBILINOGEN UR QL STRIP.AUTO: 1 EU/DL (ref 0.2–1)
WBC # BLD AUTO: 13.1 K/UL (ref 4.3–11.1)
WBC URNS QL MICRO: ABNORMAL /HPF

## 2024-11-13 PROCEDURE — 87086 URINE CULTURE/COLONY COUNT: CPT

## 2024-11-13 PROCEDURE — 2580000003 HC RX 258: Performed by: EMERGENCY MEDICINE

## 2024-11-13 PROCEDURE — 71046 X-RAY EXAM CHEST 2 VIEWS: CPT

## 2024-11-13 PROCEDURE — 87154 CUL TYP ID BLD PTHGN 6+ TRGT: CPT

## 2024-11-13 PROCEDURE — 87088 URINE BACTERIA CULTURE: CPT

## 2024-11-13 PROCEDURE — 2580000003 HC RX 258: Performed by: FAMILY MEDICINE

## 2024-11-13 PROCEDURE — 1100000000 HC RM PRIVATE

## 2024-11-13 PROCEDURE — 87205 SMEAR GRAM STAIN: CPT

## 2024-11-13 PROCEDURE — 80053 COMPREHEN METABOLIC PANEL: CPT

## 2024-11-13 PROCEDURE — 99285 EMERGENCY DEPT VISIT HI MDM: CPT

## 2024-11-13 PROCEDURE — 96360 HYDRATION IV INFUSION INIT: CPT

## 2024-11-13 PROCEDURE — 87186 SC STD MICRODIL/AGAR DIL: CPT

## 2024-11-13 PROCEDURE — 84145 PROCALCITONIN (PCT): CPT

## 2024-11-13 PROCEDURE — 83735 ASSAY OF MAGNESIUM: CPT

## 2024-11-13 PROCEDURE — 6360000002 HC RX W HCPCS: Performed by: EMERGENCY MEDICINE

## 2024-11-13 PROCEDURE — 87040 BLOOD CULTURE FOR BACTERIA: CPT

## 2024-11-13 PROCEDURE — 87077 CULTURE AEROBIC IDENTIFY: CPT

## 2024-11-13 PROCEDURE — 83605 ASSAY OF LACTIC ACID: CPT

## 2024-11-13 PROCEDURE — 85025 COMPLETE CBC W/AUTO DIFF WBC: CPT

## 2024-11-13 PROCEDURE — 6370000000 HC RX 637 (ALT 250 FOR IP): Performed by: FAMILY MEDICINE

## 2024-11-13 PROCEDURE — 81001 URINALYSIS AUTO W/SCOPE: CPT

## 2024-11-13 RX ORDER — SODIUM CHLORIDE 0.9 % (FLUSH) 0.9 %
5-40 SYRINGE (ML) INJECTION EVERY 12 HOURS SCHEDULED
Status: DISCONTINUED | OUTPATIENT
Start: 2024-11-13 | End: 2024-11-16 | Stop reason: HOSPADM

## 2024-11-13 RX ORDER — ACETAMINOPHEN 325 MG/1
650 TABLET ORAL EVERY 6 HOURS PRN
Status: DISCONTINUED | OUTPATIENT
Start: 2024-11-13 | End: 2024-11-16 | Stop reason: HOSPADM

## 2024-11-13 RX ORDER — 0.9 % SODIUM CHLORIDE 0.9 %
1000 INTRAVENOUS SOLUTION INTRAVENOUS ONCE
Status: COMPLETED | OUTPATIENT
Start: 2024-11-13 | End: 2024-11-13

## 2024-11-13 RX ORDER — SODIUM CHLORIDE 9 MG/ML
INJECTION, SOLUTION INTRAVENOUS CONTINUOUS
Status: DISCONTINUED | OUTPATIENT
Start: 2024-11-13 | End: 2024-11-15

## 2024-11-13 RX ORDER — ONDANSETRON 2 MG/ML
4 INJECTION INTRAMUSCULAR; INTRAVENOUS EVERY 6 HOURS PRN
Status: DISCONTINUED | OUTPATIENT
Start: 2024-11-13 | End: 2024-11-16 | Stop reason: HOSPADM

## 2024-11-13 RX ORDER — ONDANSETRON 4 MG/1
4 TABLET, ORALLY DISINTEGRATING ORAL EVERY 8 HOURS PRN
Status: DISCONTINUED | OUTPATIENT
Start: 2024-11-13 | End: 2024-11-16 | Stop reason: HOSPADM

## 2024-11-13 RX ORDER — POTASSIUM CHLORIDE 7.45 MG/ML
10 INJECTION INTRAVENOUS PRN
Status: DISCONTINUED | OUTPATIENT
Start: 2024-11-13 | End: 2024-11-15 | Stop reason: SDUPTHER

## 2024-11-13 RX ORDER — SODIUM CHLORIDE 9 MG/ML
INJECTION, SOLUTION INTRAVENOUS PRN
Status: DISCONTINUED | OUTPATIENT
Start: 2024-11-13 | End: 2024-11-16 | Stop reason: HOSPADM

## 2024-11-13 RX ORDER — POTASSIUM CHLORIDE 1500 MG/1
40 TABLET, EXTENDED RELEASE ORAL PRN
Status: DISCONTINUED | OUTPATIENT
Start: 2024-11-13 | End: 2024-11-15 | Stop reason: SDUPTHER

## 2024-11-13 RX ORDER — MAGNESIUM SULFATE IN WATER 40 MG/ML
2000 INJECTION, SOLUTION INTRAVENOUS PRN
Status: DISCONTINUED | OUTPATIENT
Start: 2024-11-13 | End: 2024-11-16 | Stop reason: HOSPADM

## 2024-11-13 RX ORDER — ACETAMINOPHEN 650 MG/1
650 SUPPOSITORY RECTAL EVERY 6 HOURS PRN
Status: DISCONTINUED | OUTPATIENT
Start: 2024-11-13 | End: 2024-11-16 | Stop reason: HOSPADM

## 2024-11-13 RX ORDER — FERROUS SULFATE 325(65) MG
325 TABLET ORAL
COMMUNITY

## 2024-11-13 RX ORDER — SODIUM CHLORIDE 0.9 % (FLUSH) 0.9 %
5-40 SYRINGE (ML) INJECTION PRN
Status: DISCONTINUED | OUTPATIENT
Start: 2024-11-13 | End: 2024-11-16 | Stop reason: HOSPADM

## 2024-11-13 RX ORDER — ATORVASTATIN CALCIUM 40 MG/1
40 TABLET, FILM COATED ORAL DAILY
Status: DISCONTINUED | OUTPATIENT
Start: 2024-11-13 | End: 2024-11-16

## 2024-11-13 RX ORDER — POLYETHYLENE GLYCOL 3350 17 G/17G
17 POWDER, FOR SOLUTION ORAL DAILY PRN
Status: DISCONTINUED | OUTPATIENT
Start: 2024-11-13 | End: 2024-11-16 | Stop reason: HOSPADM

## 2024-11-13 RX ADMIN — CEFTRIAXONE 1000 MG: 1 INJECTION, POWDER, FOR SOLUTION INTRAMUSCULAR; INTRAVENOUS at 19:53

## 2024-11-13 RX ADMIN — SODIUM CHLORIDE: 9 INJECTION, SOLUTION INTRAVENOUS at 19:52

## 2024-11-13 RX ADMIN — ATORVASTATIN CALCIUM 40 MG: 40 TABLET, FILM COATED ORAL at 23:31

## 2024-11-13 RX ADMIN — SODIUM CHLORIDE 1000 ML: 9 INJECTION, SOLUTION INTRAVENOUS at 17:46

## 2024-11-13 ASSESSMENT — PAIN - FUNCTIONAL ASSESSMENT: PAIN_FUNCTIONAL_ASSESSMENT: NONE - DENIES PAIN

## 2024-11-13 ASSESSMENT — LIFESTYLE VARIABLES
HOW MANY STANDARD DRINKS CONTAINING ALCOHOL DO YOU HAVE ON A TYPICAL DAY: PATIENT DOES NOT DRINK
HOW OFTEN DO YOU HAVE A DRINK CONTAINING ALCOHOL: NEVER

## 2024-11-13 NOTE — ED PROVIDER NOTES
Emergency Department Provider Note       PCP: Unknown, Provider, MD   Age: 79 y.o.   Sex: male     DISPOSITION            No diagnosis found.    Medical Decision Making     ***     {Complexity:77304}  {Risk:71165}    I independently ordered and reviewed each unique test.  {external source:30998}   {Historian (state who, why needed, what they said):89807}  {test reviewed:58388}  {EK}  {Admitted or Consultants involved.:70113}  {MIPS URI - Strep - Sinusitis - Pregnant - Head Trauma - Overdose - Agitation:39895}  {SEP1 yes/no:74126}  {Critical Care:56626}    History     Patient arrives with daughters having been [facility].  He had an outpatient urinary screening which may have been positive.  Was treated for UTI approximately 3 weeks ago family is concerned with some increasing fatigue and decreased appetite over the last 7 to 10 days maybe he has recurrence.  Patient also known to have lung cancer and at present is scheduled for procedure by Dr. Mason Parikh on Friday.  He has had prior workup which showed no brain or other metastatic disease known per family report.  Patient did have stroke event in early July of this year has been on a progressive decline since that time as well.    The history is provided by the patient (Daughters).       ROS     Review of Systems   Constitutional:  Positive for activity change, appetite change and fatigue. Negative for chills, diaphoresis and fever.   Neurological:         No acute focal change   Psychiatric/Behavioral:  Positive for decreased concentration. Negative for confusion.         Physical Exam     Vitals signs and nursing note reviewed:  Vitals:    24 1526   BP: 122/61   Pulse: 95   Resp: 17   Temp: 98.3 °F (36.8 °C)   TempSrc: Oral   SpO2: 97%   Weight: 69.4 kg (153 lb)   Height: 1.753 m (5' 9\")      Physical Exam  Vitals and nursing note reviewed.   Constitutional:       Appearance: He is not diaphoretic.      Comments: Very poorly

## 2024-11-13 NOTE — ED TRIAGE NOTES
PT ambulatory to triage with c/o altered mental status.     PT's daughter accompanied PT and reports him being confused, weak and fatigued with no appetite x1 week    PT did a UTI test at [facility] and it was positive.     PT was dx with UTI approx 3 weeks and treated with antibiotics     HX- lung cancer

## 2024-11-14 ENCOUNTER — APPOINTMENT (OUTPATIENT)
Dept: CT IMAGING | Age: 79
DRG: 064 | End: 2024-11-14
Payer: OTHER GOVERNMENT

## 2024-11-14 PROBLEM — E44.0 MODERATE PROTEIN-CALORIE MALNUTRITION (HCC): Status: ACTIVE | Noted: 2024-11-14

## 2024-11-14 LAB
ACB COMPLEX DNA BLD POS QL NAA+NON-PROBE: NOT DETECTED
ACCESSION NUMBER, LLC1M: ABNORMAL
AMMONIA PLAS-SCNC: 23 UMOL/L (ref 16–60)
ANION GAP SERPL CALC-SCNC: 10 MMOL/L (ref 7–16)
ANION GAP SERPL CALC-SCNC: 8 MMOL/L (ref 7–16)
B FRAGILIS DNA BLD POS QL NAA+NON-PROBE: NOT DETECTED
BASOPHILS # BLD: 0 K/UL (ref 0–0.2)
BASOPHILS # BLD: 0 K/UL (ref 0–0.2)
BASOPHILS NFR BLD: 0 % (ref 0–2)
BASOPHILS NFR BLD: 0 % (ref 0–2)
BIOFIRE TEST COMMENT: ABNORMAL
BUN SERPL-MCNC: 12 MG/DL (ref 8–23)
BUN SERPL-MCNC: 12 MG/DL (ref 8–23)
C ALBICANS DNA BLD POS QL NAA+NON-PROBE: NOT DETECTED
C AURIS DNA BLD POS QL NAA+NON-PROBE: NOT DETECTED
C GATTII+NEOFOR DNA BLD POS QL NAA+N-PRB: NOT DETECTED
C GLABRATA DNA BLD POS QL NAA+NON-PROBE: NOT DETECTED
C KRUSEI DNA BLD POS QL NAA+NON-PROBE: NOT DETECTED
C PARAP DNA BLD POS QL NAA+NON-PROBE: NOT DETECTED
C TROPICLS DNA BLD POS QL NAA+NON-PROBE: NOT DETECTED
CALCIUM SERPL-MCNC: 7.4 MG/DL (ref 8.8–10.2)
CALCIUM SERPL-MCNC: 7.5 MG/DL (ref 8.8–10.2)
CHLORIDE SERPL-SCNC: 101 MMOL/L (ref 98–107)
CHLORIDE SERPL-SCNC: 99 MMOL/L (ref 98–107)
CO2 SERPL-SCNC: 19 MMOL/L (ref 20–29)
CO2 SERPL-SCNC: 20 MMOL/L (ref 20–29)
CREAT SERPL-MCNC: 0.51 MG/DL (ref 0.8–1.3)
CREAT SERPL-MCNC: 0.52 MG/DL (ref 0.8–1.3)
DIFFERENTIAL METHOD BLD: ABNORMAL
DIFFERENTIAL METHOD BLD: ABNORMAL
E CLOAC COMP DNA BLD POS NAA+NON-PROBE: NOT DETECTED
E COLI DNA BLD POS QL NAA+NON-PROBE: NOT DETECTED
E FAECALIS DNA BLD POS QL NAA+NON-PROBE: DETECTED
E FAECIUM DNA BLD POS QL NAA+NON-PROBE: NOT DETECTED
ENTEROBACTERALES DNA BLD POS NAA+N-PRB: NOT DETECTED
EOSINOPHIL # BLD: 0 K/UL (ref 0–0.8)
EOSINOPHIL # BLD: 0.1 K/UL (ref 0–0.8)
EOSINOPHIL NFR BLD: 1 % (ref 0.5–7.8)
EOSINOPHIL NFR BLD: 1 % (ref 0.5–7.8)
ERYTHROCYTE [DISTWIDTH] IN BLOOD BY AUTOMATED COUNT: 17.7 % (ref 11.9–14.6)
ERYTHROCYTE [DISTWIDTH] IN BLOOD BY AUTOMATED COUNT: 17.7 % (ref 11.9–14.6)
FERRITIN SERPL-MCNC: 927 NG/ML (ref 8–388)
FOLATE SERPL-MCNC: 14.5 NG/ML (ref 3.1–17.5)
GLUCOSE SERPL-MCNC: 131 MG/DL (ref 70–99)
GLUCOSE SERPL-MCNC: 89 MG/DL (ref 70–99)
GP B STREP DNA BLD POS QL NAA+NON-PROBE: NOT DETECTED
HAEM INFLU DNA BLD POS QL NAA+NON-PROBE: NOT DETECTED
HAPTOGLOB SERPL-MCNC: 95 MG/DL (ref 30–200)
HCT VFR BLD AUTO: 21.9 % (ref 41.1–50.3)
HCT VFR BLD AUTO: 22.3 % (ref 41.1–50.3)
HCT VFR BLD AUTO: 23.2 % (ref 41.1–50.3)
HGB BLD-MCNC: 7.2 G/DL (ref 13.6–17.2)
HGB BLD-MCNC: 7.3 G/DL (ref 13.6–17.2)
HGB BLD-MCNC: 7.6 G/DL (ref 13.6–17.2)
HGB RETIC QN AUTO: 32 PG (ref 29–35)
IMM GRANULOCYTES # BLD AUTO: 0 K/UL (ref 0–0.5)
IMM GRANULOCYTES # BLD AUTO: 0.1 K/UL (ref 0–0.5)
IMM GRANULOCYTES NFR BLD AUTO: 1 % (ref 0–5)
IMM GRANULOCYTES NFR BLD AUTO: 1 % (ref 0–5)
IMM RETICS NFR: 17.2 % (ref 2.3–13.4)
IRON SATN MFR SERPL: 16 % (ref 20–50)
IRON SERPL-MCNC: 25 UG/DL (ref 35–100)
K OXYTOCA DNA BLD POS QL NAA+NON-PROBE: NOT DETECTED
KLEBSIELLA SP DNA BLD POS QL NAA+NON-PRB: NOT DETECTED
KLEBSIELLA SP DNA BLD POS QL NAA+NON-PRB: NOT DETECTED
L MONOCYTOG DNA BLD POS QL NAA+NON-PROBE: NOT DETECTED
LDH SERPL L TO P-CCNC: 284 U/L (ref 127–281)
LYMPHOCYTES # BLD: 1.1 K/UL (ref 0.5–4.6)
LYMPHOCYTES # BLD: 1.1 K/UL (ref 0.5–4.6)
LYMPHOCYTES NFR BLD: 18 % (ref 13–44)
LYMPHOCYTES NFR BLD: 21 % (ref 13–44)
MAGNESIUM SERPL-MCNC: 1.8 MG/DL (ref 1.8–2.4)
MCH RBC QN AUTO: 26.3 PG (ref 26.1–32.9)
MCH RBC QN AUTO: 26.5 PG (ref 26.1–32.9)
MCHC RBC AUTO-ENTMCNC: 32.8 G/DL (ref 31.4–35)
MCHC RBC AUTO-ENTMCNC: 32.9 G/DL (ref 31.4–35)
MCV RBC AUTO: 79.9 FL (ref 82–102)
MCV RBC AUTO: 80.8 FL (ref 82–102)
MONOCYTES # BLD: 0.3 K/UL (ref 0.1–1.3)
MONOCYTES # BLD: 0.4 K/UL (ref 0.1–1.3)
MONOCYTES NFR BLD: 6 % (ref 4–12)
MONOCYTES NFR BLD: 6 % (ref 4–12)
N MEN DNA BLD POS QL NAA+NON-PROBE: NOT DETECTED
NEUTS SEG # BLD: 3.6 K/UL (ref 1.7–8.2)
NEUTS SEG # BLD: 4.5 K/UL (ref 1.7–8.2)
NEUTS SEG NFR BLD: 70 % (ref 43–78)
NEUTS SEG NFR BLD: 75 % (ref 43–78)
NRBC # BLD: 0 K/UL (ref 0–0.2)
NRBC # BLD: 0 K/UL (ref 0–0.2)
OSMOLALITY SERPL: 264 MOSM/KG H2O (ref 280–301)
OSMOLALITY UR: 334 MOSM/KG H2O (ref 50–1400)
P AERUGINOSA DNA BLD POS NAA+NON-PROBE: NOT DETECTED
PLATELET # BLD AUTO: 216 K/UL (ref 150–450)
PLATELET # BLD AUTO: 231 K/UL (ref 150–450)
PMV BLD AUTO: 8.3 FL (ref 9.4–12.3)
PMV BLD AUTO: 8.7 FL (ref 9.4–12.3)
POTASSIUM SERPL-SCNC: 3.2 MMOL/L (ref 3.5–5.1)
POTASSIUM SERPL-SCNC: 3.5 MMOL/L (ref 3.5–5.1)
PROTEUS SP DNA BLD POS QL NAA+NON-PROBE: NOT DETECTED
RBC # BLD AUTO: 2.74 M/UL (ref 4.23–5.6)
RBC # BLD AUTO: 2.87 M/UL (ref 4.23–5.6)
RESISTANT GENE TARGETS: ABNORMAL
RETICS # AUTO: 0.05 M/UL (ref 0.03–0.1)
RETICS/RBC NFR AUTO: 1.7 % (ref 0.3–2)
S AUREUS DNA BLD POS QL NAA+NON-PROBE: NOT DETECTED
S AUREUS+CONS DNA BLD POS NAA+NON-PROBE: NOT DETECTED
S EPIDERMIDIS DNA BLD POS QL NAA+NON-PRB: NOT DETECTED
S LUGDUNENSIS DNA BLD POS QL NAA+NON-PRB: NOT DETECTED
S MALTOPHILIA DNA BLD POS QL NAA+NON-PRB: NOT DETECTED
S MARCESCENS DNA BLD POS NAA+NON-PROBE: NOT DETECTED
S PNEUM DNA BLD POS QL NAA+NON-PROBE: NOT DETECTED
S PYO DNA BLD POS QL NAA+NON-PROBE: NOT DETECTED
SALMONELLA DNA BLD POS QL NAA+NON-PROBE: NOT DETECTED
SODIUM SERPL-SCNC: 127 MMOL/L (ref 136–145)
SODIUM SERPL-SCNC: 130 MMOL/L (ref 136–145)
SODIUM UR-SCNC: 41 MMOL/L
STREPTOCOCCUS DNA BLD POS NAA+NON-PROBE: NOT DETECTED
T4 FREE SERPL-MCNC: 1.1 NG/DL (ref 0.9–1.7)
TIBC SERPL-MCNC: 152 UG/DL (ref 240–450)
TSH W FREE THYROID IF ABNORMAL: 4.78 UIU/ML (ref 0.27–4.2)
UIBC SERPL-MCNC: 127 UG/DL (ref 112–347)
VANA+VANB ISLT/SPM QL: NOT DETECTED
VIT B12 SERPL-MCNC: 759 PG/ML (ref 193–986)
WBC # BLD AUTO: 5.1 K/UL (ref 4.3–11.1)
WBC # BLD AUTO: 6 K/UL (ref 4.3–11.1)

## 2024-11-14 PROCEDURE — 83540 ASSAY OF IRON: CPT

## 2024-11-14 PROCEDURE — 6370000000 HC RX 637 (ALT 250 FOR IP): Performed by: FAMILY MEDICINE

## 2024-11-14 PROCEDURE — 36415 COLL VENOUS BLD VENIPUNCTURE: CPT

## 2024-11-14 PROCEDURE — 94760 N-INVAS EAR/PLS OXIMETRY 1: CPT

## 2024-11-14 PROCEDURE — 84300 ASSAY OF URINE SODIUM: CPT

## 2024-11-14 PROCEDURE — 82728 ASSAY OF FERRITIN: CPT

## 2024-11-14 PROCEDURE — 2580000003 HC RX 258: Performed by: FAMILY MEDICINE

## 2024-11-14 PROCEDURE — 6360000002 HC RX W HCPCS: Performed by: INTERNAL MEDICINE

## 2024-11-14 PROCEDURE — 85018 HEMOGLOBIN: CPT

## 2024-11-14 PROCEDURE — 83550 IRON BINDING TEST: CPT

## 2024-11-14 PROCEDURE — 83010 ASSAY OF HAPTOGLOBIN QUANT: CPT

## 2024-11-14 PROCEDURE — 82140 ASSAY OF AMMONIA: CPT

## 2024-11-14 PROCEDURE — 83735 ASSAY OF MAGNESIUM: CPT

## 2024-11-14 PROCEDURE — 85046 RETICYTE/HGB CONCENTRATE: CPT

## 2024-11-14 PROCEDURE — 70450 CT HEAD/BRAIN W/O DYE: CPT

## 2024-11-14 PROCEDURE — 80048 BASIC METABOLIC PNL TOTAL CA: CPT

## 2024-11-14 PROCEDURE — 84443 ASSAY THYROID STIM HORMONE: CPT

## 2024-11-14 PROCEDURE — 6360000002 HC RX W HCPCS: Performed by: FAMILY MEDICINE

## 2024-11-14 PROCEDURE — 82607 VITAMIN B-12: CPT

## 2024-11-14 PROCEDURE — 85014 HEMATOCRIT: CPT

## 2024-11-14 PROCEDURE — 83930 ASSAY OF BLOOD OSMOLALITY: CPT

## 2024-11-14 PROCEDURE — 1100000000 HC RM PRIVATE

## 2024-11-14 PROCEDURE — 83935 ASSAY OF URINE OSMOLALITY: CPT

## 2024-11-14 PROCEDURE — 82746 ASSAY OF FOLIC ACID SERUM: CPT

## 2024-11-14 PROCEDURE — 83615 LACTATE (LD) (LDH) ENZYME: CPT

## 2024-11-14 PROCEDURE — 84439 ASSAY OF FREE THYROXINE: CPT

## 2024-11-14 PROCEDURE — 85025 COMPLETE CBC W/AUTO DIFF WBC: CPT

## 2024-11-14 PROCEDURE — 2580000003 HC RX 258: Performed by: INTERNAL MEDICINE

## 2024-11-14 RX ADMIN — AMPICILLIN SODIUM 2000 MG: 2 INJECTION, POWDER, FOR SOLUTION INTRAMUSCULAR; INTRAVENOUS at 19:29

## 2024-11-14 RX ADMIN — POTASSIUM CHLORIDE 40 MEQ: 1500 TABLET, EXTENDED RELEASE ORAL at 15:53

## 2024-11-14 RX ADMIN — PANTOPRAZOLE SODIUM 40 MG: 40 INJECTION, POWDER, FOR SOLUTION INTRAVENOUS at 12:02

## 2024-11-14 RX ADMIN — ATORVASTATIN CALCIUM 40 MG: 40 TABLET, FILM COATED ORAL at 19:30

## 2024-11-14 RX ADMIN — AMPICILLIN SODIUM 2000 MG: 2 INJECTION, POWDER, FOR SOLUTION INTRAMUSCULAR; INTRAVENOUS at 22:41

## 2024-11-14 RX ADMIN — PANTOPRAZOLE SODIUM 40 MG: 40 INJECTION, POWDER, FOR SOLUTION INTRAVENOUS at 19:30

## 2024-11-14 RX ADMIN — AMPICILLIN SODIUM 2000 MG: 2 INJECTION, POWDER, FOR SOLUTION INTRAMUSCULAR; INTRAVENOUS at 15:52

## 2024-11-14 RX ADMIN — CEFTRIAXONE 1000 MG: 1 INJECTION, POWDER, FOR SOLUTION INTRAMUSCULAR; INTRAVENOUS at 19:29

## 2024-11-14 RX ADMIN — SODIUM CHLORIDE, PRESERVATIVE FREE 10 ML: 5 INJECTION INTRAVENOUS at 19:36

## 2024-11-14 NOTE — ASSESSMENT & PLAN NOTE
- Patient has scheduled lung surgery on Friday at [facility]  - If stable/improved tomorrow, may be able to discharge home to follow up with scheduled surgery to avoid another postponement

## 2024-11-14 NOTE — H&P
Hospitalist History and Physical   Admit Date:  2024  4:43 PM   Name:  Jeronimo Ortiz Sr.   Age:  79 y.o.  Sex:  male  :  1945   MRN:  [id number]     Presenting Complaint: AMS  Reason(s) for Admission: Acute metabolic encephalopathy [G93.41]     History of Present Illness:   Jeronimo Ortiz Sr. is a 79 y.o. male with medical history of HTN, recent diagnosis of lung cancer who presented to ED with AMS.  Daughter provides history given patient's current status.  Patient has been weak/easily fatigable over the past week.  Increased confusion as well.  Per report, patient was diagnosed with UTI ~3 weeks ago and completed a course of antibiotics.  Daughter reports a positive UTI test from [facility] recently as well.  Upon ER evaluation, lactic acid is 2.5.  WBC is 13.1.  Procal is 0.38.  Na is 127.  ER has been unable to obtain urine sample yet, but plans for in & out cath.  UA with reflex to culture has been ordered.  Hospitalist asked to admit.    Review of Systems:  10 systems reviewed and negative except as noted in HPI.  Assessment & Plan:   * Acute metabolic encephalopathy  Assessment & Plan  - Concern for UTI causing symptoms  - UA pending.  Will send for culture  - Continue rocephin for now  - Monitor for improvement    Hyponatremia  Assessment & Plan  - Na is 127  - NS @ 75ml/hr  - Recheck in AM    Bronchogenic cancer of right lung (HCC)  Assessment & Plan  - Patient has scheduled lung [facility] on [facility]  - If stable/improved tomorrow, may be able to discharge home to follow up with scheduled surgery to avoid another postponement    Sensorineural hearing loss, bilateral  Assessment & Plan  - Of note    COPD (chronic obstructive pulmonary disease) (HCC)  Assessment & Plan  - Not in acute exacerbation    Hypertension  Assessment & Plan  - BP stable        Disposition: inpatient      Past medical history reviewed.    Past Medical History:   Diagnosis Date    Cerebral artery  occlusion with cerebral infarction (HCC)     COPD (chronic obstructive pulmonary disease) (HCC)     Hypertension     Skin cancer      Past surgical history reviewed.  History reviewed. No pertinent surgical history.   No Known Allergies   Social History     Tobacco Use    Smoking status: Former     Types: Cigarettes    Smokeless tobacco: Never   Substance Use Topics    Alcohol use: Not Currently      History reviewed. No pertinent family history.   Family history reviewed and noncontributory to patient's acute condition; no relevant family history unless otherwise noted above.  Immunization History   Administered Date(s) Administered    COVID-19, MODERNA BLUE border, Primary or Immunocompromised, (age 12y+), IM, 100 mcg/0.5mL 01/21/2021, 02/18/2021, 04/27/2022    COVID-19, MODERNA Bivalent, (age 12y+), IM, 50 mcg/0.5 mL 09/23/2022, 09/24/2022    COVID-19, MODERNA Booster BLUE border, (age 18y+), IM, 50mcg/0.25mL 08/22/2021    COVID-19, MODERNA, (age 12y+), IM, 50mcg/0.5mL 01/17/2024    Influenza Virus Vaccine 11/30/2007, 11/13/2008, 10/16/2009, 11/28/2010, 01/05/2011, 10/18/2011, 01/01/2012, 11/01/2012, 11/01/2013, 09/25/2014, 10/09/2014, 02/27/2015, 10/01/2015, 12/08/2015, 01/01/2017, 01/07/2017, 10/01/2017, 10/02/2018, 10/01/2019, 09/01/2020, 10/10/2021, 09/24/2022, 10/01/2022    Influenza, FLUAD, (age 65 y+), IM, Quadv, 0.5mL 08/15/2023    Influenza, FLUCELVAX, (age 6 mo+), MDCK, Quadv PF, 0.5mL 10/15/2019    Influenza, FLUZONE High Dose (age 65 y+), IM, Quadv, 0.7mL 09/16/2021    Influenza, FLUZONE High Dose, (age 65 y+), IM, Trivalent PF, 0.5mL 09/18/2018, 09/10/2024    Pneumococcal, PCV-13, PREVNAR 13, (age 6w+), IM, 0.5mL 04/08/2016, 09/18/2018    Pneumococcal, PPSV23, PNEUMOVAX 23, (age 2y+), SC/IM, 0.5mL 05/30/2008, 05/02/2013    TDaP, ADACEL (age 10y-64y), BOOSTRIX (age 10y+), IM, 0.5mL 04/03/2015, 04/27/2022    Td vaccine (adult) 11/30/2007    Zoster Live (Zostavax) 04/03/2015    Zoster Recombinant

## 2024-11-14 NOTE — PROGRESS NOTES
Nutrition Assessment    Assessment Type: Initial, Positive nutrition screen  Reason for visit:  Best Practice Alert for positive Malnutrition Screening Tool   Malnutrition Screening Tool Score: 3    Nutrition Intervention/Recommendations:   Food and/or Nutrient Delivery:   Meals and Snacks:  Diet: Continue current diet and advance as medically appropriate  Medical Food Supplements:   Medical food supplement therapy:  Initiate Ensure Clear (clear liquid oral supplement) 240 calories, 8 grams protein per 8 ounce serving     Malnutrition Assessment:  Malnutrition Status: Moderate malnutrition  Context: Chronic Illness  Findings of clinical characteristics of malnutrition:   Energy Intake:  Mild decrease in energy intake (supplementation via oral supplement has contributed caloric and protein density)  Weight Loss:  Greater than 7.5% over 3 months (14% loss since September)     Body Fat Loss:  No body fat loss     Muscle Mass Loss:  Mild muscle mass loss Temples (temporalis), Clavicles (pectoralis & deltoids)    Nutrition Assessment:  Food/Nutrition Related History:   Hx per family at bedside (daughter - lives with her sister and granddaughter) as pt slept throughout majority of visit, they report he has experienced increasing confusion as well.  Pt with decreased interest and declining portions starting after his stroke/TIA in July.  Intake gradually worsened resulting in the [facility] implementing ensure 1-2 servings daily.  He recently was told to increase to 4 servings daily if he was not eating meals to prepare for upcoming surgery.  Family notes varied acceptance of oral supplements often with pt only accepting to get them to leave him alone.  At times he will eat a decent meal per day.  He no longer wears his dentures.       Weight History:   Wt hx per EMR review:   166# surgical oncology office 10/16/24  177# Pulmonology office 9/10/242.   Admission wt is stated.  At RD encounter bed scale does not work.  Family at  [place]

## 2024-11-14 NOTE — PROGRESS NOTES
lb).    Intake/Output Summary (Last 24 hours) at 11/14/2024 1152  Last data filed at 11/13/2024 1837  Gross per 24 hour   Intake 1000 ml   Output --   Net 1000 ml         Physical Exam:   Physical Exam      I have personally reviewed labs and tests:  Recent Labs:  Recent Results (from the past 48 hour(s))   CMP    Collection Time: 11/13/24  5:23 PM   Result Value Ref Range    Sodium 127 (L) 136 - 145 mmol/L    Potassium 4.4 3.5 - 5.1 mmol/L    Chloride 92 (L) 98 - 107 mmol/L    CO2 19 (L) 20 - 29 mmol/L    Anion Gap 16 7 - 16 mmol/L    Glucose 150 (H) 70 - 99 mg/dL    BUN 18 8 - 23 MG/DL    Creatinine 0.83 0.80 - 1.30 MG/DL    Est, Glom Filt Rate 88 >60 ml/min/1.73m2    Calcium 8.8 8.8 - 10.2 MG/DL    Total Bilirubin 0.5 0.0 - 1.2 MG/DL    ALT 65 (H) 8 - 55 U/L    AST 51 (H) 15 - 37 U/L    Alk Phosphatase 112 40 - 129 U/L    Total Protein 7.8 6.3 - 8.2 g/dL    Albumin 2.7 (L) 3.2 - 4.6 g/dL    Globulin 5.1 (H) 2.3 - 3.5 g/dL    Albumin/Globulin Ratio 0.5 (L) 1.0 - 1.9     CBC with Auto Differential    Collection Time: 11/13/24  5:23 PM   Result Value Ref Range    WBC 13.1 (H) 4.3 - 11.1 K/uL    RBC 4.01 (L) 4.23 - 5.6 M/uL    Hemoglobin 10.3 (L) 13.6 - 17.2 g/dL    Hematocrit 32.7 (L) 41.1 - 50.3 %    MCV 81.5 (L) 82.0 - 102.0 FL    MCH 25.7 (L) 26.1 - 32.9 PG    MCHC 31.5 31.4 - 35.0 g/dL    RDW 17.6 (H) 11.9 - 14.6 %    Platelets 342 150 - 450 K/uL    MPV 8.6 (L) 9.4 - 12.3 FL    nRBC 0.00 0.0 - 0.2 K/uL    Differential Type AUTOMATED      Neutrophils % 80 (H) 43 - 78 %    Lymphocytes % 14 13 - 44 %    Monocytes % 4 4.0 - 12.0 %    Eosinophils % 0 (L) 0.5 - 7.8 %    Basophils % 0 0.0 - 2.0 %    Immature Granulocytes % 1 0.0 - 5.0 %    Neutrophils Absolute 10.5 (H) 1.7 - 8.2 K/UL    Lymphocytes Absolute 1.9 0.5 - 4.6 K/UL    Monocytes Absolute 0.6 0.1 - 1.3 K/UL    Eosinophils Absolute 0.0 0.0 - 0.8 K/UL    Basophils Absolute 0.0 0.0 - 0.2 K/UL    Immature Granulocytes Absolute 0.1 0.0 - 0.5 K/UL   Magnesium     26.1 - 32.9 PG    MCHC 32.9 31.4 - 35.0 g/dL    RDW 17.7 (H) 11.9 - 14.6 %    Platelets 231 150 - 450 K/uL    MPV 8.7 (L) 9.4 - 12.3 FL    nRBC 0.00 0.0 - 0.2 K/uL    Differential Type AUTOMATED      Neutrophils % 75 43 - 78 %    Lymphocytes % 18 13 - 44 %    Monocytes % 6 4.0 - 12.0 %    Eosinophils % 1 0.5 - 7.8 %    Basophils % 0 0.0 - 2.0 %    Immature Granulocytes % 1 0.0 - 5.0 %    Neutrophils Absolute 4.5 1.7 - 8.2 K/UL    Lymphocytes Absolute 1.1 0.5 - 4.6 K/UL    Monocytes Absolute 0.4 0.1 - 1.3 K/UL    Eosinophils Absolute 0.0 0.0 - 0.8 K/UL    Basophils Absolute 0.0 0.0 - 0.2 K/UL    Immature Granulocytes Absolute 0.1 0.0 - 0.5 K/UL   Magnesium    Collection Time: 11/14/24  4:31 AM   Result Value Ref Range    Magnesium 1.8 1.8 - 2.4 mg/dL   Ferritin    Collection Time: 11/14/24  4:31 AM   Result Value Ref Range    Ferritin 927 (H) 8 - 388 NG/ML   Reticulocytes    Collection Time: 11/14/24  4:31 AM   Result Value Ref Range    Reticulocyte Count,Automated 1.7 0.3 - 2.0 %    Absolute Retic # 0.0474 0.026 - 0.095 M/ul    Immature Retic Fraction 17.2 (H) 2.3 - 13.4 %    Retic Hemoglobin conc. 32 29 - 35 pg   TSH with Reflex    Collection Time: 11/14/24  4:31 AM   Result Value Ref Range    TSH w Free Thyroid if Abnormal 4.78 (H) 0.27 - 4.20 UIU/ML   Lactate Dehydrogenase    Collection Time: 11/14/24  4:31 AM   Result Value Ref Range     (H) 127 - 281 U/L   T4, Free    Collection Time: 11/14/24  4:31 AM   Result Value Ref Range    T4 Free 1.1 0.9 - 1.7 NG/DL   Hemoglobin and Hematocrit    Collection Time: 11/14/24  7:45 AM   Result Value Ref Range    Hemoglobin 7.3 (L) 13.6 - 17.2 g/dL    Hematocrit 22.3 (L) 41.1 - 50.3 %   Sodium, urine, random    Collection Time: 11/14/24 10:44 AM   Result Value Ref Range    SODIUM, RANDOM URINE 41 MMOL/L       No results for input(s): \"COVID19\" in the last 72 hours.    Current Meds:  Current Facility-Administered Medications   Medication Dose Route Frequency

## 2024-11-14 NOTE — CONSULTS
Infectious Disease Consult      Today's Date: 11/14/2024   Admit Date: 11/13/2024  YOB: 1945    Impression:   E. 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  - TTE 11/15: Appears to be small mobile echodensities at the base of the noncoronary cusp/right coronary cusp on the ventricular aspect likely suggestive of vegetations.     Encephalopathy  - CT Head: Small 14 mm area of suspect hemorrhage in the medial left cerebral hemisphere along the posterior falx. I cannot exclude a hemorrhagic metastasis given  history of lung cancer.  - MRI pending.    Hyponatremia  Recent UTI about 3 weeks ago, done at [facility], no results available for review   Anemia   Bronchogenic cancer of right lung, scheduled for lung surgery with PH  COPD     Plan:   Recommend Ampicillin 2g q4hr and increase Ceftriaxone to 2g q12hr for presumed IE.  Recommend Cardiology consult for consideration of GYPSY.  F/u repeat bcx's done today.  Following.    Anti-infectives:   Rocephin 11/13--  Ampicillin 11/13--    Subjective:   Date of Consultation:  November 14, 2024  Date of Admission: 11/13/2024   Referring Provider: Len Guzman   Reason for consult: \"GPC bacteremia, possibly urinary source\"    Patient is a 79 y.o. male with PMH of CVA, HTN, recently diagnosed lung cancer who presented with AMS and weakness for a week.   Pt was diagnosed with UTI about 3 weeks ago and was seen and treated with a course of abx per daughter that was done at [facility]. Pt is a poor historian per chart review.   In ED, pt was afebrile, WBC of 13.1, normalized the next day, procal of 0.38, Na+ 127. UA was unremarkable. BCX positive for E. Faecalis by PCR. CT head notes area of hemorrhage     ID is consulted for antibiotic recommendations.     Seen at bedside this afternoon. Family present. Discussed findings and plan of care with daughter. Pt is pleasantly confused but will respond appropriately to certain prompts. Doesn't  DETECTED NOTDET      Staphylococcus Aureus NOT DETECTED NOTDET      Staphylococcus epidermidis by PCR NOT DETECTED NOTDET      Staphylococcus lugdunensis by PCR NOT DETECTED NOTDET      STREPTOCOCCUS NOT DETECTED NOTDET      Streptococcus agalactiae (Group B) NOT DETECTED NOTDET      Strep pneumoniae NOT DETECTED NOTDET      Strep pyogenes,(Grp. A) NOT DETECTED NOTDET      Acinetobacter calcoac baumannii complex by PCR NOT DETECTED NOTDET      Bacteroides fragilis by PCR NOT DETECTED NOTDET      Enterobacteriaceae by PCR NOT DETECTED NOTDET      Enterobacter cloacae complex by PCR NOT DETECTED NOTDET      Escherichia Coli NOT DETECTED NOTDET      Klebsiella aerogenes by PCR NOT DETECTED NOTDET      Klebsiella oxytoca by PCR NOT DETECTED NOTDET      Klebsiella pneumoniae group by PCR NOT DETECTED NOTDET      Proteus by PCR NOT DETECTED NOTDET      Salmonella species by PCR NOT DETECTED NOTDET      Serratia marcescens by PCR NOT DETECTED NOTDET      Haemophilus Influenzae by PCR NOT DETECTED NOTDET      Neisseria meningitidis by PCR NOT DETECTED NOTDET      Pseudomonas aeruginosa NOT DETECTED NOTDET      Stenotrophomonas maltophilia by PCR NOT DETECTED NOTDET      Candida albicans by PCR NOT DETECTED NOTDET      Candida auris by PCR NOT DETECTED NOTDET      Candida glabrata NOT DETECTED NOTDET      Candida krusei by PCR NOT DETECTED NOTDET      Candida parapsilosis by PCR NOT DETECTED NOTDET      Candida tropicalis by PCR NOT DETECTED NOTDET      Cryptococcus neoformans/gattii by PCR NOT DETECTED NOTDET      Resistant gene targets        Vancomycin resistance gene NOT DETECTED NOTDET      [employer] test comment        False positive results may rarely occur. Correlate with clinical,epidemiologic, and other laboratory findings   Basic Metabolic Panel w/ Reflex to MG    Collection Time: 11/14/24  4:31 AM   Result Value Ref Range    Sodium 127 (L) 136 - 145 mmol/L    Potassium 3.5 3.5 - 5.1 mmol/L    Chloride 99 98 - 107

## 2024-11-14 NOTE — ASSESSMENT & PLAN NOTE
- Concern for UTI causing symptoms  - UA pending.  Will send for culture  - Continue rocephin for now  - Monitor for improvement

## 2024-11-14 NOTE — ED NOTES
TRANSFER - OUT REPORT:    Verbal report given to Dianne RN on Jeronimo Ortiz Sr.  being transferred to [place] [address] for routine progression of patient care       Report consisted of patient's Situation, Background, Assessment and   Recommendations(SBAR).     Information from the following report(s) Nurse Handoff Report was reviewed with the receiving nurse.    Kinder Fall Assessment:                           Lines:   Peripheral IV 11/13/24 Left Antecubital (Active)   Site Assessment Clean, dry & intact 11/13/24 1738   Line Status Blood return noted;Flushed;Normal saline locked;Specimen collected 11/13/24 1738   Phlebitis Assessment No symptoms 11/13/24 1738   Infiltration Assessment 0 11/13/24 1738   Dressing Status Intact w/seal 11/13/24 1738   Dressing Type Transparent 11/13/24 1738        Opportunity for questions and clarification was provided.      Patient transported with:  Registered Nurse

## 2024-11-14 NOTE — PLAN OF CARE
Problem: Chronic Conditions and Co-morbidities  Goal: Patient's chronic conditions and co-morbidity symptoms are monitored and maintained or improved  Outcome: Progressing     Problem: Discharge Planning  Goal: Discharge to home or other facility with appropriate resources  Outcome: Progressing     Problem: Safety - Adult  Goal: Free from fall injury  Outcome: Progressing     Problem: ABCDS Injury Assessment  Goal: Absence of physical injury  Outcome: Progressing     Problem: Confusion  Goal: Confusion, delirium, dementia, or psychosis is improved or at baseline  Description: INTERVENTIONS:  1. Assess for possible contributors to thought disturbance, including medications, impaired vision or hearing, underlying metabolic abnormalities, dehydration, psychiatric diagnoses, and notify attending LIP  2. [place] high risk fall precautions, as indicated  3. Provide frequent short contacts to provide reality reorientation, refocusing and direction  4. Decrease environmental stimuli, including noise as appropriate  5. Monitor and intervene to maintain adequate nutrition, hydration, elimination, sleep and activity  6. If unable to ensure safety without constant attention obtain sitter and review sitter guidelines with assigned personnel  7. Initiate Psychosocial CNS and Spiritual Care consult, as indicated  Outcome: Progressing

## 2024-11-14 NOTE — PROGRESS NOTES
4 Eyes Skin Assessment     NAME:  Jeronimo Ortiz .  YOB: 1945  MEDICAL RECORD NUMBER:  [id number]    The patient is being assessed for  Admission    I agree that at least one RN has performed a thorough Head to Toe Skin Assessment on the patient. ALL assessment sites listed below have been assessed.      Areas assessed by both nurses:    Head, Face, Ears, Shoulders, Back, Chest, Arms, Elbows, Hands, Sacrum. Buttock, Coccyx, Ischium, and Legs. Feet and Heels        Does the Patient have a Wound? No noted wound(s)       [place] Prevention initiated by RN: Yes  Wound Care Orders initiated by RN: No    Pressure Injury (Stage 3,4, Unstageable, DTI, NWPT, and Complex wounds) if present, place Wound referral order by RN under : No    New Ostomies, if present place, Ostomy referral order under : No     Nurse 1 eSignature: Electronically signed by Dianne Mike RN on 11/13/24 at 10:06 PM EST    **SHARE this note so that the co-signing nurse can place an eSignature**    Nurse 2 eSignature: Electronically signed by Renetta Pineda RN on 11/13/24 at 10:29 PM EST

## 2024-11-14 NOTE — CONSULTS
1.753 m (5' 9\")   Wt 69.4 kg (153 lb)   SpO2 93%   BMI 22.59 kg/m²      - GENERAL: Alert and oriented x 1 (person). No acute distress. Well-nourished.    - EYES: EOMI. Anicteric.    - HENT: Moist mucous membranes. No cervical lymphadenopathy.    - LUNGS: Clear to auscultation bilaterally. No accessory muscle use.    - CARDIOVASCULAR: Regular rate and rhythm. No murmur. No JVD.    - ABDOMEN: Soft, non-tender and non-distended. No palpable masses.    - EXTREMITIES: No edema. Non-tender.?SKIN: No rashes or lesions. Warm.    - NEUROLOGIC: No focal neurological deficits. CN II-XII grossly intact, but not individually tested.    - PSYCHIATRIC: Cooperative. Appropriate mood and affect.    - SKIN: No rashes, sores, or lesions.     - RECTAL: Deferred.     Labs    CBC:  Recent Labs     11/13/24 1723 11/14/24 0431 11/14/24  0745   WBC 13.1* 6.0  --    RBC 4.01* 2.74*  --    HGB 10.3* 7.2* 7.3*   HCT 32.7* 21.9* 22.3*   MCV 81.5* 79.9*  --    RDW 17.6* 17.7*  --     231  --      CHEMISTRIES:  Recent Labs     11/13/24 1723 11/14/24 0431   * 127*   K 4.4 3.5   CL 92* 99   CO2 19* 20   BUN 18 12   CREATININE 0.83 0.52*   GLUCOSE 150* 89   MG 1.9 1.8     PT/INR:No results for input(s): \"PROTIME\", \"INR\" in the last 72 hours.  APTT:No results for input(s): \"APTT\" in the last 72 hours.  LIVER PROFILE:  Recent Labs     11/13/24 1723   AST 51*   ALT 65*   BILITOT 0.5   ALKPHOS 112       Imaging/Diagnostics   Pertinent studies mentioned above.    Assessment & Plan:   1) Anemia- no signs for overt bleeding, suspect this maybe mutlifactorial in the setting of  underlying malignancy (AOCD) and possible intermittent GI blood loss. If patient has ongoing issues with anemia once malignancy is addressed then we can consider outpatient EGD/colonoscopy if patient agreeable    Recs  Hold off on EGD and colon at this time unless anemia continues to worsen and/or if patient manifests any overt GI bleeding.     PLEASE KEEP NPO  AFTER MN IN CASE PATIENT CLINICAL STATUS CHANGES. DISCUSSED PLAN WITH FAMILY AT BEDSIDE.      Consider transitioning to monotherapy with ASA and discontinuation of Eliquis is appropriate from cardiac standpoint    Consider CT a/p if worsening anemia without signs for overt bleeding to exclude retroperitoneal source    Protonix 40mg PO BID now and at home, consider Carafate     Check iron studies, TSH, B12, retic count, folate, B12, peripheral smear    If signs for JOHN, then recommend IV iron as inpatient and PO iron as OP     Avoid NSAIDs    Check fecal occult to assess for any possible chronic GI bleed         Thank you for involving the GI service in the care of your patient. Please dont hesitate to call me directly on my cell below with any questions, updates, etc.      -Clifton Payton MD  [phone]   Gastroenterology/Hepatology

## 2024-11-14 NOTE — CARE COORDINATION
11/14/24 1355   Service Assessment   Patient Orientation Unable to Assess;Other (see comment)  (Pt sound asleep-would wake up and speak and immidently return to sleep)   Cognition Other (see comment)  (Unable to arose.)   History Provided By Child/Family  (Daughter Tita and granddaughter Orquidea)   Primary Caregiver Other (Comment)  (Daughters Tita, Stephanie, and granddaughter Orquidea)   Accompanied By/Relationship Granddaughter Orquidea and daughter Tita   Support Systems Children;Other (Comment)  (Granddaughter)   Patient's Healthcare Decision Maker is: Legal Next of Kin   PCP Verified by CM Yes  ([facility])   Last Visit to PCP Within last 6 months   Prior Functional Level Assistance with the following:   Current Functional Level Assistance with the following:   Can patient return to prior living arrangement Unknown at present   Ability to make needs known: Poor   Family able to assist with home care needs: Yes   Would you like for me to discuss the discharge plan with any other family members/significant others, and if so, who? No   Financial Resources  ([facility])   Community Resources None   CM/SW Referral Family concerns/conflicts;New life changing diagnosis   Social/Functional History   Lives With Thelma Whitman reviewed chart and discussed in Md rounds this am. Pt is an 79 yr old gentleman who was adm last night due to AMS, increased confusion, weak, suspected UTI. Pt was living in [facility]. 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 Family reports pt came to live with one of his daughters Stephanie in August. Stephanie works so his granddaughter Orquidea who works at the [employer] come to care for him during the day. His health has been declining ever since the summer. Pt has had two deaths recently and been placed on an anti depressant. He has not been eating or drinking well and overall lacks any motivation. Pt was suppose to have a thoracotomy at [facility] tomorrow to remove  lung cancer from his R  lobe. Pt is not medically stable and too weak to withstand a surgical procedure per family. He has had UTIs in the past and one is suspected.   Sw asked about code status and neither daughter nor granddaughter knew his status. Sw discussed possible home health care services or Palliative Care services. Family did not really want to discuss discharge plans at this time as they kept saying they needed more information and results from tests. Pt has told granddaughter in the recent past he either wants the lung surgery and to be cancer free and get on with living or he just wants to be home and die.   This family seems very close with the pt and want what's best for him. They will need much follow.  Granddaughter says pt has Medicare as well as [facility] [employer]. His discharge care will have to be coord with the [facility] . Sw to cont to follow and assist.   Holly Bowers/GEN

## 2024-11-15 ENCOUNTER — APPOINTMENT (OUTPATIENT)
Dept: CT IMAGING | Age: 79
DRG: 064 | End: 2024-11-15
Payer: OTHER GOVERNMENT

## 2024-11-15 ENCOUNTER — APPOINTMENT (OUTPATIENT)
Dept: NON INVASIVE DIAGNOSTICS | Age: 79
DRG: 064 | End: 2024-11-15
Attending: INTERNAL MEDICINE
Payer: OTHER GOVERNMENT

## 2024-11-15 ENCOUNTER — APPOINTMENT (OUTPATIENT)
Dept: MRI IMAGING | Age: 79
DRG: 064 | End: 2024-11-15
Payer: OTHER GOVERNMENT

## 2024-11-15 LAB
ANION GAP SERPL CALC-SCNC: 9 MMOL/L (ref 7–16)
BACTERIA SPEC CULT: ABNORMAL
BACTERIA SPEC CULT: ABNORMAL
BASOPHILS # BLD: 0 K/UL (ref 0–0.2)
BASOPHILS NFR BLD: 1 % (ref 0–2)
BUN SERPL-MCNC: 9 MG/DL (ref 8–23)
CALCIUM SERPL-MCNC: 7.5 MG/DL (ref 8.8–10.2)
CHLORIDE SERPL-SCNC: 102 MMOL/L (ref 98–107)
CO2 SERPL-SCNC: 18 MMOL/L (ref 20–29)
CORTIS AM PEAK SERPL-MCNC: 16.5 UG/DL (ref 4.8–19.5)
CREAT SERPL-MCNC: 0.5 MG/DL (ref 0.8–1.3)
DIFFERENTIAL METHOD BLD: ABNORMAL
ECHO AR MAX VEL PISA: 3.4 M/S
ECHO AV REGURGITANT PHT: 181 MS
ECHO BSA: 1.84 M2
ECHO LV EDV A2C: 120 ML
ECHO LV EDV A4C: 119 ML
ECHO LV EDV INDEX A4C: 65 ML/M2
ECHO LV EDV NDEX A2C: 65 ML/M2
ECHO LV EJECTION FRACTION A2C: 61 %
ECHO LV EJECTION FRACTION A4C: 57 %
ECHO LV EJECTION FRACTION BIPLANE: 59 % (ref 55–100)
ECHO LV ESV A2C: 47 ML
ECHO LV ESV A4C: 52 ML
ECHO LV ESV INDEX A2C: 26 ML/M2
ECHO LV ESV INDEX A4C: 28 ML/M2
EOSINOPHIL # BLD: 0.1 K/UL (ref 0–0.8)
EOSINOPHIL NFR BLD: 1 % (ref 0.5–7.8)
ERYTHROCYTE [DISTWIDTH] IN BLOOD BY AUTOMATED COUNT: 17.6 % (ref 11.9–14.6)
GLUCOSE SERPL-MCNC: 103 MG/DL (ref 70–99)
HCT VFR BLD AUTO: 23.7 % (ref 41.1–50.3)
HCT VFR BLD AUTO: 24.8 % (ref 41.1–50.3)
HEMOCCULT STL QL: NEGATIVE
HGB BLD-MCNC: 7.7 G/DL (ref 13.6–17.2)
HGB BLD-MCNC: 8 G/DL (ref 13.6–17.2)
IMM GRANULOCYTES # BLD AUTO: 0 K/UL (ref 0–0.5)
IMM GRANULOCYTES NFR BLD AUTO: 1 % (ref 0–5)
LYMPHOCYTES # BLD: 1 K/UL (ref 0.5–4.6)
LYMPHOCYTES NFR BLD: 18 % (ref 13–44)
MCH RBC QN AUTO: 26.2 PG (ref 26.1–32.9)
MCHC RBC AUTO-ENTMCNC: 32.5 G/DL (ref 31.4–35)
MCV RBC AUTO: 80.6 FL (ref 82–102)
MONOCYTES # BLD: 0.3 K/UL (ref 0.1–1.3)
MONOCYTES NFR BLD: 6 % (ref 4–12)
NEUTS SEG # BLD: 4.1 K/UL (ref 1.7–8.2)
NEUTS SEG NFR BLD: 73 % (ref 43–78)
NRBC # BLD: 0 K/UL (ref 0–0.2)
PATH REV BLD -IMP: NORMAL
PLATELET # BLD AUTO: 233 K/UL (ref 150–450)
PMV BLD AUTO: 8.3 FL (ref 9.4–12.3)
POTASSIUM SERPL-SCNC: 3.8 MMOL/L (ref 3.5–5.1)
RBC # BLD AUTO: 2.94 M/UL (ref 4.23–5.6)
SERVICE CMNT-IMP: ABNORMAL
SODIUM SERPL-SCNC: 129 MMOL/L (ref 136–145)
WBC # BLD AUTO: 5.5 K/UL (ref 4.3–11.1)

## 2024-11-15 PROCEDURE — 82272 OCCULT BLD FECES 1-3 TESTS: CPT

## 2024-11-15 PROCEDURE — 2580000003 HC RX 258: Performed by: STUDENT IN AN ORGANIZED HEALTH CARE EDUCATION/TRAINING PROGRAM

## 2024-11-15 PROCEDURE — 80048 BASIC METABOLIC PNL TOTAL CA: CPT

## 2024-11-15 PROCEDURE — 82533 TOTAL CORTISOL: CPT

## 2024-11-15 PROCEDURE — 2580000003 HC RX 258: Performed by: INTERNAL MEDICINE

## 2024-11-15 PROCEDURE — 2000000000 HC ICU R&B

## 2024-11-15 PROCEDURE — 6370000000 HC RX 637 (ALT 250 FOR IP): Performed by: FAMILY MEDICINE

## 2024-11-15 PROCEDURE — 6360000002 HC RX W HCPCS: Performed by: INTERNAL MEDICINE

## 2024-11-15 PROCEDURE — 85014 HEMATOCRIT: CPT

## 2024-11-15 PROCEDURE — 6370000000 HC RX 637 (ALT 250 FOR IP): Performed by: INTERNAL MEDICINE

## 2024-11-15 PROCEDURE — 93321 DOPPLER ECHO F-UP/LMTD STD: CPT

## 2024-11-15 PROCEDURE — 87040 BLOOD CULTURE FOR BACTERIA: CPT

## 2024-11-15 PROCEDURE — 85025 COMPLETE CBC W/AUTO DIFF WBC: CPT

## 2024-11-15 PROCEDURE — 2580000003 HC RX 258: Performed by: FAMILY MEDICINE

## 2024-11-15 PROCEDURE — 85018 HEMOGLOBIN: CPT

## 2024-11-15 PROCEDURE — 70450 CT HEAD/BRAIN W/O DYE: CPT

## 2024-11-15 PROCEDURE — 6360000004 HC RX CONTRAST MEDICATION: Performed by: FAMILY MEDICINE

## 2024-11-15 PROCEDURE — 6360000002 HC RX W HCPCS: Performed by: STUDENT IN AN ORGANIZED HEALTH CARE EDUCATION/TRAINING PROGRAM

## 2024-11-15 PROCEDURE — A9579 GAD-BASE MR CONTRAST NOS,1ML: HCPCS | Performed by: FAMILY MEDICINE

## 2024-11-15 PROCEDURE — 70553 MRI BRAIN STEM W/O & W/DYE: CPT

## 2024-11-15 PROCEDURE — 36415 COLL VENOUS BLD VENIPUNCTURE: CPT

## 2024-11-15 RX ORDER — LABETALOL HYDROCHLORIDE 5 MG/ML
5 INJECTION, SOLUTION INTRAVENOUS EVERY 4 HOURS PRN
Status: DISCONTINUED | OUTPATIENT
Start: 2024-11-15 | End: 2024-11-16 | Stop reason: HOSPADM

## 2024-11-15 RX ORDER — POTASSIUM CHLORIDE 7.45 MG/ML
10 INJECTION INTRAVENOUS PRN
Status: DISCONTINUED | OUTPATIENT
Start: 2024-11-15 | End: 2024-11-16 | Stop reason: HOSPADM

## 2024-11-15 RX ORDER — POTASSIUM CHLORIDE 1500 MG/1
40 TABLET, EXTENDED RELEASE ORAL PRN
Status: DISCONTINUED | OUTPATIENT
Start: 2024-11-15 | End: 2024-11-16 | Stop reason: HOSPADM

## 2024-11-15 RX ORDER — LEVETIRACETAM 500 MG/1
500 TABLET ORAL 2 TIMES DAILY
Status: DISCONTINUED | OUTPATIENT
Start: 2024-11-15 | End: 2024-11-16 | Stop reason: HOSPADM

## 2024-11-15 RX ORDER — SODIUM CHLORIDE 9 MG/ML
INJECTION, SOLUTION INTRAVENOUS CONTINUOUS
Status: ACTIVE | OUTPATIENT
Start: 2024-11-15 | End: 2024-11-15

## 2024-11-15 RX ORDER — LACTOBACILLUS RHAMNOSUS GG 10B CELL
1 CAPSULE ORAL
Status: DISCONTINUED | OUTPATIENT
Start: 2024-11-16 | End: 2024-11-16 | Stop reason: HOSPADM

## 2024-11-15 RX ADMIN — POTASSIUM CHLORIDE 40 MEQ: 1500 TABLET, EXTENDED RELEASE ORAL at 05:10

## 2024-11-15 RX ADMIN — PANTOPRAZOLE SODIUM 40 MG: 40 INJECTION, POWDER, FOR SOLUTION INTRAVENOUS at 09:04

## 2024-11-15 RX ADMIN — GADOTERIDOL 14 ML: 279.3 INJECTION, SOLUTION INTRAVENOUS at 15:20

## 2024-11-15 RX ADMIN — LORAZEPAM 0.5 MG: 2 INJECTION INTRAMUSCULAR; INTRAVENOUS at 14:19

## 2024-11-15 RX ADMIN — AMPICILLIN SODIUM 2000 MG: 2 INJECTION, POWDER, FOR SOLUTION INTRAMUSCULAR; INTRAVENOUS at 15:46

## 2024-11-15 RX ADMIN — AMPICILLIN SODIUM 2000 MG: 2 INJECTION, POWDER, FOR SOLUTION INTRAMUSCULAR; INTRAVENOUS at 03:01

## 2024-11-15 RX ADMIN — AMPICILLIN SODIUM 2000 MG: 2 INJECTION, POWDER, FOR SOLUTION INTRAMUSCULAR; INTRAVENOUS at 19:21

## 2024-11-15 RX ADMIN — AMPICILLIN SODIUM 2000 MG: 2 INJECTION, POWDER, FOR SOLUTION INTRAMUSCULAR; INTRAVENOUS at 12:13

## 2024-11-15 RX ADMIN — AMPICILLIN SODIUM 2000 MG: 2 INJECTION, POWDER, FOR SOLUTION INTRAMUSCULAR; INTRAVENOUS at 07:21

## 2024-11-15 RX ADMIN — LEVETIRACETAM 500 MG: 500 TABLET, FILM COATED ORAL at 18:28

## 2024-11-15 RX ADMIN — SODIUM CHLORIDE: 9 INJECTION, SOLUTION INTRAVENOUS at 02:52

## 2024-11-15 RX ADMIN — AMPICILLIN SODIUM 2000 MG: 2 INJECTION, POWDER, FOR SOLUTION INTRAMUSCULAR; INTRAVENOUS at 23:06

## 2024-11-15 RX ADMIN — CEFTRIAXONE SODIUM 2000 MG: 2 INJECTION, POWDER, FOR SOLUTION INTRAMUSCULAR; INTRAVENOUS at 18:31

## 2024-11-15 RX ADMIN — SODIUM CHLORIDE, PRESERVATIVE FREE 10 ML: 5 INJECTION INTRAVENOUS at 20:53

## 2024-11-15 RX ADMIN — SODIUM CHLORIDE: 9 INJECTION, SOLUTION INTRAVENOUS at 23:05

## 2024-11-15 RX ADMIN — SODIUM CHLORIDE, PRESERVATIVE FREE 10 ML: 5 INJECTION INTRAVENOUS at 09:07

## 2024-11-15 RX ADMIN — SODIUM CHLORIDE: 9 INJECTION, SOLUTION INTRAVENOUS at 13:07

## 2024-11-15 RX ADMIN — PANTOPRAZOLE SODIUM 40 MG: 40 INJECTION, POWDER, FOR SOLUTION INTRAVENOUS at 20:53

## 2024-11-15 NOTE — PROGRESS NOTES
TRANSFER - IN REPORT:    Verbal report received from RN on Jeronimo Oscar Ortiz Sr.  being received from [facility] for routine progression of patient care      Report consisted of patient's Situation, Background, Assessment and   Recommendations(SBAR).     Information from the following report(s) Nurse Handoff Report, Adult Overview, Intake/Output, MAR, and Recent Results was reviewed with the receiving nurse.    Opportunity for questions and clarification was provided.      Assessment completed upon patient's arrival to unit and care assumed.

## 2024-11-15 NOTE — CARE COORDINATION
Pt chart reviewed and discussed in Critical Care Interdisciplinary Rounds. LOS 2 days. Pt admitted with acute metabolic encephalopathy. Per MD, ID and neurosurgery consulted; CT head today; hemorrhage and bacteremia; IV ABX gtts; potential discharge Monday. Pt not medically stable for discharge.     DC/POC pending clinical course.    CM team will continue to follow for potential discharge needs that may arise.       Kate Saleem, MSW, LBSW    [facility]

## 2024-11-15 NOTE — PLAN OF CARE
Problem: Chronic Conditions and Co-morbidities  Goal: Patient's chronic conditions and co-morbidity symptoms are monitored and maintained or improved  Outcome: Progressing  Flowsheets  Taken 11/15/2024 0700 by Jessie Barber RN  Care Plan - Patient's Chronic Conditions and Co-Morbidity Symptoms are Monitored and Maintained or Improved:   Monitor and assess patient's chronic conditions and comorbid symptoms for stability, deterioration, or improvement   Collaborate with multidisciplinary team to address chronic and comorbid conditions and prevent exacerbation or deterioration   Update acute care plan with appropriate goals if chronic or comorbid symptoms are exacerbated and prevent overall improvement and discharge  Taken 11/15/2024 0100 by Kamran Medrano RN  Care Plan - Patient's Chronic Conditions and Co-Morbidity Symptoms are Monitored and Maintained or Improved:   Monitor and assess patient's chronic conditions and comorbid symptoms for stability, deterioration, or improvement   Collaborate with multidisciplinary team to address chronic and comorbid conditions and prevent exacerbation or deterioration   Update acute care plan with appropriate goals if chronic or comorbid symptoms are exacerbated and prevent overall improvement and discharge     Problem: Discharge Planning  Goal: Discharge to home or other facility with appropriate resources  Outcome: Progressing  Flowsheets  Taken 11/15/2024 0700 by Jessie Barber RN  Discharge to home or other facility with appropriate resources:   Identify barriers to discharge with patient and caregiver   Arrange for needed discharge resources and transportation as appropriate   Refer to discharge planning if patient needs post-hospital services based on physician order or complex needs related to functional status, cognitive ability or social support system   Identify discharge learning needs (meds, wound care, etc)  Taken 11/15/2024 0100 by Kamran Medrano  including medications, impaired vision or hearing, underlying metabolic abnormalities, dehydration, psychiatric diagnoses, notify LIP   [place] high risk fall precautions, as indicated   Provide frequent short contacts to provide reality reorientation, refocusing and direction

## 2024-11-15 NOTE — PROGRESS NOTES
Pt transferred to ICU per protocol with positive CT of the head. Provided education to the family and pt. Report given to ICU nurse. Pt stable at transfer.

## 2024-11-15 NOTE — PROGRESS NOTES
10.2 MG/DL   CBC with Auto Differential    Collection Time: 11/15/24  6:05 AM   Result Value Ref Range    WBC 5.5 4.3 - 11.1 K/uL    RBC 2.94 (L) 4.23 - 5.6 M/uL    Hemoglobin 7.7 (L) 13.6 - 17.2 g/dL    Hematocrit 23.7 (L) 41.1 - 50.3 %    MCV 80.6 (L) 82.0 - 102.0 FL    MCH 26.2 26.1 - 32.9 PG    MCHC 32.5 31.4 - 35.0 g/dL    RDW 17.6 (H) 11.9 - 14.6 %    Platelets 233 150 - 450 K/uL    MPV 8.3 (L) 9.4 - 12.3 FL    nRBC 0.00 0.0 - 0.2 K/uL    Differential Type AUTOMATED      Neutrophils % 73 43 - 78 %    Lymphocytes % 18 13 - 44 %    Monocytes % 6 4.0 - 12.0 %    Eosinophils % 1 0.5 - 7.8 %    Basophils % 1 0.0 - 2.0 %    Immature Granulocytes % 1 0.0 - 5.0 %    Neutrophils Absolute 4.1 1.7 - 8.2 K/UL    Lymphocytes Absolute 1.0 0.5 - 4.6 K/UL    Monocytes Absolute 0.3 0.1 - 1.3 K/UL    Eosinophils Absolute 0.1 0.0 - 0.8 K/UL    Basophils Absolute 0.0 0.0 - 0.2 K/UL    Immature Granulocytes Absolute 0.0 0.0 - 0.5 K/UL        Gisela Ferguson, APRN - CNP

## 2024-11-15 NOTE — PROGRESS NOTES
4 Eyes Skin Assessment     NAME:  Jeronimo Ortiz .  YOB: 1945  MEDICAL RECORD NUMBER:  [id number]    The patient is being assessed for  Admission    I agree that at least one RN has performed a thorough Head to Toe Skin Assessment on the patient. ALL assessment sites listed below have been assessed.      Areas assessed by both nurses:    Head, Face, Ears, Shoulders, Back, Chest, Arms, Elbows, Hands, Sacrum. Buttock, Coccyx, Ischium, and Legs. Feet and Heels        Does the Patient have a Wound? No noted wound(s)       [place] Prevention initiated by RN: No  Wound Care Orders initiated by RN: No    Pressure Injury (Stage 3,4, Unstageable, DTI, NWPT, and Complex wounds) if present, place Wound referral order by RN under : No    New Ostomies, if present place, Ostomy referral order under : No     Nurse 1 eSignature: Electronically signed by Kamran Medrano RN on 11/15/24 at 1:26 AM EST    **SHARE this note so that the co-signing nurse can place an eSignature**    Nurse 2 eSignature: {Esignature:[id number]}

## 2024-11-15 NOTE — SIGNIFICANT EVENT
Received [employer] message stating that a head CT was \"positive.\"  Upon chart review, Radiology read as follows:  IMPRESSION:  Small 14 mm area of suspect hemorrhage in the medial left cerebral hemisphere along the posterior falx. I cannot exclude a hemorrhagic metastasis given history of lung cancer. MRI brain with/without contrast is recommended.  Will transfer patient to ICU per protocol and order MRI brain with/without contrast per recommendation.

## 2024-11-15 NOTE — PROGRESS NOTES
23.7 (L) 41.1 - 50.3 %    MCV 80.6 (L) 82.0 - 102.0 FL    MCH 26.2 26.1 - 32.9 PG    MCHC 32.5 31.4 - 35.0 g/dL    RDW 17.6 (H) 11.9 - 14.6 %    Platelets 233 150 - 450 K/uL    MPV 8.3 (L) 9.4 - 12.3 FL    nRBC 0.00 0.0 - 0.2 K/uL    Differential Type AUTOMATED      Neutrophils % 73 43 - 78 %    Lymphocytes % 18 13 - 44 %    Monocytes % 6 4.0 - 12.0 %    Eosinophils % 1 0.5 - 7.8 %    Basophils % 1 0.0 - 2.0 %    Immature Granulocytes % 1 0.0 - 5.0 %    Neutrophils Absolute 4.1 1.7 - 8.2 K/UL    Lymphocytes Absolute 1.0 0.5 - 4.6 K/UL    Monocytes Absolute 0.3 0.1 - 1.3 K/UL    Eosinophils Absolute 0.1 0.0 - 0.8 K/UL    Basophils Absolute 0.0 0.0 - 0.2 K/UL    Immature Granulocytes Absolute 0.0 0.0 - 0.5 K/UL       No results for input(s): \"COVID19\" in the last 72 hours.    Current Meds:  Current Facility-Administered Medications   Medication Dose Route Frequency    labetalol (NORMODYNE;TRANDATE) injection 5 mg  5 mg IntraVENous Q4H PRN    potassium chloride (KLOR-CON M) extended release tablet 40 mEq  40 mEq Oral PRN    Or    potassium bicarb-citric acid (EFFER-K) effervescent tablet 40 mEq  40 mEq Oral PRN    Or    potassium chloride 10 mEq/100 mL IVPB (Peripheral Line)  10 mEq IntraVENous PRN    0.9 % sodium chloride infusion   IntraVENous Continuous    pantoprazole (PROTONIX) 40 mg in sodium chloride (PF) 0.9 % 10 mL injection  40 mg IntraVENous BID    ampicillin (OMNIPEN) 2,000 mg in sodium chloride 0.9 % 100 mL IVPB (mini-bag)  2,000 mg IntraVENous Q4H    atorvastatin (LIPITOR) tablet 40 mg  40 mg Oral Daily    sodium chloride flush 0.9 % injection 5-40 mL  5-40 mL IntraVENous 2 times per day    sodium chloride flush 0.9 % injection 5-40 mL  5-40 mL IntraVENous PRN    0.9 % sodium chloride infusion   IntraVENous PRN    magnesium sulfate 2000 mg in 50 mL IVPB premix  2,000 mg IntraVENous PRN    ondansetron (ZOFRAN-ODT) disintegrating tablet 4 mg  4 mg Oral Q8H PRN    Or    ondansetron (ZOFRAN) injection 4 mg  4  mg IntraVENous Q6H PRN    polyethylene glycol (GLYCOLAX) packet 17 g  17 g Oral Daily PRN    acetaminophen (TYLENOL) tablet 650 mg  650 mg Oral Q6H PRN    Or    acetaminophen (TYLENOL) suppository 650 mg  650 mg Rectal Q6H PRN    cefTRIAXone (ROCEPHIN) 1,000 mg in sterile water 10 mL IV syringe  1,000 mg IntraVENous Q24H       Signed:  Len Guzman MD    Part of this note may have been written by using a voice dictation software.  The note has been proof read but may still contain some grammatical/other typographical errors.

## 2024-11-16 ENCOUNTER — HOSPITAL ENCOUNTER (INPATIENT)
Age: 79
LOS: 6 days | Discharge: HOME HEALTH | End: 2024-11-22
Attending: FAMILY MEDICINE
Payer: OTHER GOVERNMENT

## 2024-11-16 VITALS
SYSTOLIC BLOOD PRESSURE: 120 MMHG | OXYGEN SATURATION: 93 % | DIASTOLIC BLOOD PRESSURE: 45 MMHG | HEIGHT: 69 IN | RESPIRATION RATE: 20 BRPM | WEIGHT: 153 LBS | BODY MASS INDEX: 22.66 KG/M2 | HEART RATE: 87 BPM | TEMPERATURE: 98.7 F

## 2024-11-16 DIAGNOSIS — I63.9 CEREBROVASCULAR ACCIDENT (CVA), UNSPECIFIED MECHANISM (HCC): ICD-10-CM

## 2024-11-16 DIAGNOSIS — I63.9 STROKE (CEREBRUM) (HCC): Primary | ICD-10-CM

## 2024-11-16 PROBLEM — I62.9 INTRACRANIAL HEMORRHAGE (HCC): Status: ACTIVE | Noted: 2024-11-16

## 2024-11-16 PROBLEM — R78.81 BACTEREMIA: Status: ACTIVE | Noted: 2024-11-16

## 2024-11-16 PROBLEM — I61.1 NONTRAUMATIC CORTICAL HEMORRHAGE OF LEFT CEREBRAL HEMISPHERE (HCC): Status: ACTIVE | Noted: 2024-11-16

## 2024-11-16 PROBLEM — I33.0 INFECTIVE ENDOCARDITIS OF AORTIC VALVE: Status: ACTIVE | Noted: 2024-11-16

## 2024-11-16 PROBLEM — I35.1 AORTIC VALVE REGURGITATION: Status: ACTIVE | Noted: 2024-11-16

## 2024-11-16 PROBLEM — I33.0 INFECTIVE ENDOCARDITIS: Status: ACTIVE | Noted: 2024-11-16

## 2024-11-16 PROBLEM — C34.90 PRIMARY MALIGNANT NEOPLASM OF LUNG METASTATIC TO OTHER SITE (HCC): Status: ACTIVE | Noted: 2024-11-16

## 2024-11-16 LAB
ANION GAP SERPL CALC-SCNC: 9 MMOL/L (ref 7–16)
BACTERIA SPEC CULT: ABNORMAL
BASOPHILS # BLD: 0 K/UL (ref 0–0.2)
BASOPHILS NFR BLD: 1 % (ref 0–2)
BUN SERPL-MCNC: 6 MG/DL (ref 8–23)
CALCIUM SERPL-MCNC: 7.5 MG/DL (ref 8.8–10.2)
CHLORIDE SERPL-SCNC: 103 MMOL/L (ref 98–107)
CO2 SERPL-SCNC: 17 MMOL/L (ref 20–29)
CREAT SERPL-MCNC: 0.45 MG/DL (ref 0.8–1.3)
DIFFERENTIAL METHOD BLD: ABNORMAL
EOSINOPHIL # BLD: 0.1 K/UL (ref 0–0.8)
EOSINOPHIL NFR BLD: 2 % (ref 0.5–7.8)
ERYTHROCYTE [DISTWIDTH] IN BLOOD BY AUTOMATED COUNT: 17.9 % (ref 11.9–14.6)
GLUCOSE SERPL-MCNC: 85 MG/DL (ref 70–99)
GRAM STN SPEC: ABNORMAL
HCT VFR BLD AUTO: 23.4 % (ref 41.1–50.3)
HCT VFR BLD AUTO: 24.2 % (ref 41.1–50.3)
HCT VFR BLD AUTO: 25.4 % (ref 41.1–50.3)
HGB BLD-MCNC: 7.8 G/DL (ref 13.6–17.2)
HGB BLD-MCNC: 8 G/DL (ref 13.6–17.2)
HGB BLD-MCNC: 8.4 G/DL (ref 13.6–17.2)
IMM GRANULOCYTES # BLD AUTO: 0 K/UL (ref 0–0.5)
IMM GRANULOCYTES NFR BLD AUTO: 1 % (ref 0–5)
LYMPHOCYTES # BLD: 0.9 K/UL (ref 0.5–4.6)
LYMPHOCYTES NFR BLD: 17 % (ref 13–44)
MCH RBC QN AUTO: 26.4 PG (ref 26.1–32.9)
MCHC RBC AUTO-ENTMCNC: 33.1 G/DL (ref 31.4–35)
MCV RBC AUTO: 79.9 FL (ref 82–102)
MONOCYTES # BLD: 0.3 K/UL (ref 0.1–1.3)
MONOCYTES NFR BLD: 6 % (ref 4–12)
NEUTS SEG # BLD: 4.1 K/UL (ref 1.7–8.2)
NEUTS SEG NFR BLD: 74 % (ref 43–78)
NRBC # BLD: 0 K/UL (ref 0–0.2)
PLATELET # BLD AUTO: 239 K/UL (ref 150–450)
PMV BLD AUTO: 8.4 FL (ref 9.4–12.3)
POTASSIUM SERPL-SCNC: 3.6 MMOL/L (ref 3.5–5.1)
RBC # BLD AUTO: 3.03 M/UL (ref 4.23–5.6)
SERVICE CMNT-IMP: ABNORMAL
SERVICE CMNT-IMP: ABNORMAL
SODIUM SERPL-SCNC: 129 MMOL/L (ref 136–145)
WBC # BLD AUTO: 5.5 K/UL (ref 4.3–11.1)

## 2024-11-16 PROCEDURE — 2580000003 HC RX 258: Performed by: FAMILY MEDICINE

## 2024-11-16 PROCEDURE — 99233 SBSQ HOSP IP/OBS HIGH 50: CPT | Performed by: NEUROLOGICAL SURGERY

## 2024-11-16 PROCEDURE — 2580000003 HC RX 258: Performed by: STUDENT IN AN ORGANIZED HEALTH CARE EDUCATION/TRAINING PROGRAM

## 2024-11-16 PROCEDURE — 6360000002 HC RX W HCPCS: Performed by: INTERNAL MEDICINE

## 2024-11-16 PROCEDURE — 2580000003 HC RX 258: Performed by: INTERNAL MEDICINE

## 2024-11-16 PROCEDURE — 36415 COLL VENOUS BLD VENIPUNCTURE: CPT

## 2024-11-16 PROCEDURE — 2000000000 HC ICU R&B

## 2024-11-16 PROCEDURE — 99291 CRITICAL CARE FIRST HOUR: CPT | Performed by: PSYCHIATRY & NEUROLOGY

## 2024-11-16 PROCEDURE — 85025 COMPLETE CBC W/AUTO DIFF WBC: CPT

## 2024-11-16 PROCEDURE — 6370000000 HC RX 637 (ALT 250 FOR IP): Performed by: INTERNAL MEDICINE

## 2024-11-16 PROCEDURE — 80048 BASIC METABOLIC PNL TOTAL CA: CPT

## 2024-11-16 PROCEDURE — 6360000002 HC RX W HCPCS: Performed by: STUDENT IN AN ORGANIZED HEALTH CARE EDUCATION/TRAINING PROGRAM

## 2024-11-16 PROCEDURE — 99223 1ST HOSP IP/OBS HIGH 75: CPT | Performed by: INTERNAL MEDICINE

## 2024-11-16 PROCEDURE — 85014 HEMATOCRIT: CPT

## 2024-11-16 PROCEDURE — 85018 HEMOGLOBIN: CPT

## 2024-11-16 RX ORDER — POTASSIUM CHLORIDE 1500 MG/1
40 TABLET, EXTENDED RELEASE ORAL PRN
Status: DISCONTINUED | OUTPATIENT
Start: 2024-11-16 | End: 2024-11-22 | Stop reason: HOSPADM

## 2024-11-16 RX ORDER — ATORVASTATIN CALCIUM 40 MG/1
80 TABLET, FILM COATED ORAL DAILY
Status: DISCONTINUED | OUTPATIENT
Start: 2024-11-16 | End: 2024-11-16 | Stop reason: HOSPADM

## 2024-11-16 RX ORDER — LACTOBACILLUS RHAMNOSUS GG 10B CELL
1 CAPSULE ORAL
Status: CANCELLED | OUTPATIENT
Start: 2024-11-17

## 2024-11-16 RX ORDER — SODIUM CHLORIDE 9 MG/ML
INJECTION, SOLUTION INTRAVENOUS PRN
Status: DISCONTINUED | OUTPATIENT
Start: 2024-11-16 | End: 2024-11-22 | Stop reason: HOSPADM

## 2024-11-16 RX ORDER — SODIUM CHLORIDE 0.9 % (FLUSH) 0.9 %
5-40 SYRINGE (ML) INJECTION EVERY 12 HOURS SCHEDULED
Status: DISCONTINUED | OUTPATIENT
Start: 2024-11-16 | End: 2024-11-22 | Stop reason: HOSPADM

## 2024-11-16 RX ORDER — LABETALOL HYDROCHLORIDE 5 MG/ML
5 INJECTION, SOLUTION INTRAVENOUS EVERY 4 HOURS PRN
Status: DISCONTINUED | OUTPATIENT
Start: 2024-11-16 | End: 2024-11-22 | Stop reason: HOSPADM

## 2024-11-16 RX ORDER — LACTOBACILLUS RHAMNOSUS GG 10B CELL
1 CAPSULE ORAL
Status: DISCONTINUED | OUTPATIENT
Start: 2024-11-17 | End: 2024-11-22 | Stop reason: HOSPADM

## 2024-11-16 RX ORDER — SODIUM CHLORIDE 0.9 % (FLUSH) 0.9 %
5-40 SYRINGE (ML) INJECTION PRN
Status: CANCELLED | OUTPATIENT
Start: 2024-11-16

## 2024-11-16 RX ORDER — POLYETHYLENE GLYCOL 3350 17 G/17G
17 POWDER, FOR SOLUTION ORAL DAILY PRN
Status: CANCELLED | OUTPATIENT
Start: 2024-11-16

## 2024-11-16 RX ORDER — SODIUM CHLORIDE 0.9 % (FLUSH) 0.9 %
5-40 SYRINGE (ML) INJECTION EVERY 12 HOURS SCHEDULED
Status: CANCELLED | OUTPATIENT
Start: 2024-11-16

## 2024-11-16 RX ORDER — ACETAMINOPHEN 325 MG/1
650 TABLET ORAL EVERY 6 HOURS PRN
Status: DISCONTINUED | OUTPATIENT
Start: 2024-11-16 | End: 2024-11-22 | Stop reason: HOSPADM

## 2024-11-16 RX ORDER — ONDANSETRON 2 MG/ML
4 INJECTION INTRAMUSCULAR; INTRAVENOUS EVERY 6 HOURS PRN
Status: DISCONTINUED | OUTPATIENT
Start: 2024-11-16 | End: 2024-11-22 | Stop reason: HOSPADM

## 2024-11-16 RX ORDER — LEVETIRACETAM 500 MG/1
500 TABLET ORAL 2 TIMES DAILY
Status: CANCELLED | OUTPATIENT
Start: 2024-11-16

## 2024-11-16 RX ORDER — LEVETIRACETAM 500 MG/1
500 TABLET ORAL 2 TIMES DAILY
Status: DISCONTINUED | OUTPATIENT
Start: 2024-11-16 | End: 2024-11-22 | Stop reason: HOSPADM

## 2024-11-16 RX ORDER — MAGNESIUM SULFATE IN WATER 40 MG/ML
2000 INJECTION, SOLUTION INTRAVENOUS PRN
Status: DISCONTINUED | OUTPATIENT
Start: 2024-11-16 | End: 2024-11-22 | Stop reason: HOSPADM

## 2024-11-16 RX ORDER — LABETALOL HYDROCHLORIDE 5 MG/ML
5 INJECTION, SOLUTION INTRAVENOUS EVERY 4 HOURS PRN
Status: CANCELLED | OUTPATIENT
Start: 2024-11-16

## 2024-11-16 RX ORDER — MAGNESIUM SULFATE IN WATER 40 MG/ML
2000 INJECTION, SOLUTION INTRAVENOUS PRN
Status: CANCELLED | OUTPATIENT
Start: 2024-11-16

## 2024-11-16 RX ORDER — ONDANSETRON 2 MG/ML
4 INJECTION INTRAMUSCULAR; INTRAVENOUS EVERY 6 HOURS PRN
Status: CANCELLED | OUTPATIENT
Start: 2024-11-16

## 2024-11-16 RX ORDER — POTASSIUM CHLORIDE 7.45 MG/ML
10 INJECTION INTRAVENOUS PRN
Status: CANCELLED | OUTPATIENT
Start: 2024-11-16

## 2024-11-16 RX ORDER — ACETAMINOPHEN 650 MG/1
650 SUPPOSITORY RECTAL EVERY 6 HOURS PRN
Status: DISCONTINUED | OUTPATIENT
Start: 2024-11-16 | End: 2024-11-22 | Stop reason: HOSPADM

## 2024-11-16 RX ORDER — ACETAMINOPHEN 325 MG/1
650 TABLET ORAL EVERY 6 HOURS PRN
Status: CANCELLED | OUTPATIENT
Start: 2024-11-16

## 2024-11-16 RX ORDER — POTASSIUM CHLORIDE 1500 MG/1
40 TABLET, EXTENDED RELEASE ORAL PRN
Status: CANCELLED | OUTPATIENT
Start: 2024-11-16

## 2024-11-16 RX ORDER — SODIUM CHLORIDE 0.9 % (FLUSH) 0.9 %
5-40 SYRINGE (ML) INJECTION PRN
Status: DISCONTINUED | OUTPATIENT
Start: 2024-11-16 | End: 2024-11-22 | Stop reason: HOSPADM

## 2024-11-16 RX ORDER — ACETAMINOPHEN 650 MG/1
650 SUPPOSITORY RECTAL EVERY 6 HOURS PRN
Status: CANCELLED | OUTPATIENT
Start: 2024-11-16

## 2024-11-16 RX ORDER — SODIUM CHLORIDE 9 MG/ML
INJECTION, SOLUTION INTRAVENOUS PRN
Status: CANCELLED | OUTPATIENT
Start: 2024-11-16

## 2024-11-16 RX ORDER — ONDANSETRON 4 MG/1
4 TABLET, ORALLY DISINTEGRATING ORAL EVERY 8 HOURS PRN
Status: DISCONTINUED | OUTPATIENT
Start: 2024-11-16 | End: 2024-11-22 | Stop reason: HOSPADM

## 2024-11-16 RX ORDER — ONDANSETRON 4 MG/1
4 TABLET, ORALLY DISINTEGRATING ORAL EVERY 8 HOURS PRN
Status: CANCELLED | OUTPATIENT
Start: 2024-11-16

## 2024-11-16 RX ORDER — ATORVASTATIN CALCIUM 40 MG/1
80 TABLET, FILM COATED ORAL DAILY
Status: CANCELLED | OUTPATIENT
Start: 2024-11-16

## 2024-11-16 RX ORDER — POTASSIUM CHLORIDE 7.45 MG/ML
10 INJECTION INTRAVENOUS PRN
Status: DISCONTINUED | OUTPATIENT
Start: 2024-11-16 | End: 2024-11-22 | Stop reason: HOSPADM

## 2024-11-16 RX ORDER — POLYETHYLENE GLYCOL 3350 17 G/17G
17 POWDER, FOR SOLUTION ORAL DAILY PRN
Status: DISCONTINUED | OUTPATIENT
Start: 2024-11-16 | End: 2024-11-22 | Stop reason: HOSPADM

## 2024-11-16 RX ORDER — ATORVASTATIN CALCIUM 80 MG/1
80 TABLET, FILM COATED ORAL DAILY
Status: DISCONTINUED | OUTPATIENT
Start: 2024-11-16 | End: 2024-11-22 | Stop reason: HOSPADM

## 2024-11-16 RX ADMIN — SODIUM CHLORIDE: 9 INJECTION, SOLUTION INTRAVENOUS at 03:19

## 2024-11-16 RX ADMIN — SODIUM CHLORIDE 25 ML/HR: 9 INJECTION, SOLUTION INTRAVENOUS at 15:04

## 2024-11-16 RX ADMIN — SODIUM CHLORIDE, PRESERVATIVE FREE 10 ML: 5 INJECTION INTRAVENOUS at 08:05

## 2024-11-16 RX ADMIN — ATORVASTATIN CALCIUM 80 MG: 80 TABLET, FILM COATED ORAL at 21:03

## 2024-11-16 RX ADMIN — AMPICILLIN SODIUM 2000 MG: 2 INJECTION, POWDER, FOR SOLUTION INTRAVENOUS at 15:07

## 2024-11-16 RX ADMIN — Medication 1 CAPSULE: at 08:05

## 2024-11-16 RX ADMIN — CEFTRIAXONE SODIUM 2000 MG: 2 INJECTION, POWDER, FOR SOLUTION INTRAMUSCULAR; INTRAVENOUS at 06:05

## 2024-11-16 RX ADMIN — PANTOPRAZOLE SODIUM 40 MG: 40 INJECTION, POWDER, FOR SOLUTION INTRAVENOUS at 08:05

## 2024-11-16 RX ADMIN — CEFTRIAXONE SODIUM 2000 MG: 2 INJECTION, POWDER, FOR SOLUTION INTRAMUSCULAR; INTRAVENOUS at 18:32

## 2024-11-16 RX ADMIN — AMPICILLIN SODIUM 2000 MG: 2 INJECTION, POWDER, FOR SOLUTION INTRAMUSCULAR; INTRAVENOUS at 03:20

## 2024-11-16 RX ADMIN — LEVETIRACETAM 500 MG: 500 TABLET, FILM COATED ORAL at 21:03

## 2024-11-16 RX ADMIN — LEVETIRACETAM 500 MG: 500 TABLET, FILM COATED ORAL at 08:05

## 2024-11-16 RX ADMIN — SODIUM CHLORIDE, PRESERVATIVE FREE 10 ML: 5 INJECTION INTRAVENOUS at 21:05

## 2024-11-16 RX ADMIN — AMPICILLIN SODIUM 2000 MG: 2 INJECTION, POWDER, FOR SOLUTION INTRAMUSCULAR; INTRAVENOUS at 07:42

## 2024-11-16 RX ADMIN — PANTOPRAZOLE SODIUM 40 MG: 40 INJECTION, POWDER, FOR SOLUTION INTRAVENOUS at 21:04

## 2024-11-16 RX ADMIN — AMPICILLIN SODIUM 2000 MG: 2 INJECTION, POWDER, FOR SOLUTION INTRAVENOUS at 21:02

## 2024-11-16 ASSESSMENT — PAIN SCALES - GENERAL
PAINLEVEL_OUTOF10: 0

## 2024-11-16 NOTE — CONSULTS
an adjacent and connected similar  0.5 cm T1 hyperintense focus (axial T1-weighted image 17).    There is mild enhancement along the margins of the larger intrinsically T1  hyperintense focus, and there is surrounding leptomeningeal enhancement and  susceptibility artifact along the adjacent sulci.    There is a more inferolaterally positioned enhancing mass involving the medial  left occipital cortex and measuring 0.4 x 0.4 cm (axial postcontrast image 152).  There is surrounding vasogenic edema.    Small bilateral dependent mastoid effusions. Mild dependent right sphenoid sinus  mucosal thickening.    Prior left lens replacement.    Unchanged 6 mm oval area of prominence at the bifurcation of the azygos SANTI in  the interhemispheric fissure (axial postcontrast image 206).    There are no suspicious osseous lesions.    Impression  1. Moderate-sized acute or early subacute infarction at the junction of the left  temporal lobe and left parietal lobe. There is mild local sulcal effacement,  without other mass effect. There is small volume petechial hemorrhage,  demonstrated on the gradient images only.    2. There is a 0.4 x 0.4 cm oval enhancing focus involving the anterior medial  left occipital cortex. Given the endobronchial mass demonstrated on the  7/26/2024 examination, this is most consistent with a cortical metastasis. There  is mild surrounding vasogenic edema without significant associated mass effect.    3. Corresponding to the left parietal lobe hyperdensity on the prior head CT,  there are 2 small adjacent areas of intraparenchymal hemorrhage and adjacent  cerebral convexity subarachnoid hemorrhage. The small areas of hemorrhage  measure 0.8 x 0.7 cm and 0.5 cm. The larger hemorrhage minimally enhances. Given  the round morphology of the hemorrhage and the above described separate left  occipital cortical mass, these favor hemorrhagic metastases. The adjacent  leptomeningeal enhancement is favored to be  risk in recent prospective trials.  Eliquis should be discontinued.  Initiate high intensity statin   Neurochecks Q4H x 24 hours  MRI of brain without contrast.  If history of malignancy perform with and without contrast done and reviewed see above  CTA of head and neck reviewed  Bedside swallow test   Labs: A1c, FLP, TSH, Cardiac enzymes, BMP, CBC  Telemetry.  Will likely require GYPSY  PT/OT/ST  DVT prophylaxis   BP management -normotensive  Smoking cessation if applicable   Diabetes education if applicable   Involve oncology team-he is seen at [facility] may wish to initiate checkpoint inhibitor therapy as soon as possible.  Palliative RT to brain  Do not believe that Keppra would on a prophylactic basis be substantially helpful  Ongoing antimicrobial therapy for sepsis  Treat temperature if present aggressively with Tylenol    Time spent 45 minutes.  Extensive chart review review of MRI labs etc. as well as clinical examination of patient and discussion with family.  Patient is clearly critically ill.  Time spent excluded other potentially billable procedures          Thang Esquivel MD

## 2024-11-16 NOTE — CONSULTS
[facility] Cardiology Initial Cardiac Evaluation                 Date of  Admission: 11/16/2024 10:03 AM     Primary Care Physician: Unknown, Provider, MD  Primary Cardiologist: None  Referring Physician: Dr Chiu  Attending Physician: Dr Fowler    CC: bacteremia       Jeronimo Ortiz Sr. is a 79 y.o. male admitted for acute metabolic encephalopathy.  He has a h/o htn, CVA and recently dx lung cancer, was admitted to [facility] w AMS and weakness, recently treated for UTI, BCX + for E faecalis.  Head CT/MRI showed mod L temporal and parietal lobe CVA w L medial cerebral, could not r/o brain mets. 11/14/24 TTE EF 60% w possible AV vegetation, mod AR.  Pt w no h/o cardiac disease, denies CP, SOB, dizziness, palpitations, syncope, LE edema.   Neuro pending.       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  No Known Allergies   No family history on file.   Social History     Tobacco Use    Smoking status: Former     Types: Cigarettes    Smokeless tobacco: Never   Substance Use Topics    Alcohol use: Not Currently        No current facility-administered medications for this encounter.       Review of Symptoms:  General: no     BUN 18 12   < > 9  --  6*   WBC 13.1* 6.0   < > 5.5  --  5.5   HGB 10.3* 7.2*   < > 7.7* 8.0* 8.0*   HCT 32.7* 21.9*   < > 23.7* 24.8* 24.2*    231   < > 233  --  239    < > = values in this interval not displayed.     Serum creatinine: 0.45 mg/dL (L) 11/16/24 0229  Estimated creatinine clearance: 129 mL/min (A)     Assessment/Plan:     Assessment:   CVA (cerebral vascular accident) (HCC)- acute ischemic stroke w hemorrhagic transformation  - ASA, keppra per neuro    Bacteremia- abnormal AV on TTE  - antibiotics per ID  - will follow to consider GYPSY next week      Hypertension  - permissive per neuro       COPD (chronic obstructive pulmonary disease) ([facility])      Thank you very much for this referral. We appreciate the opportunity to participate in this patient's care. We will follow along with above stated plan.    Pat Porras PA-C  Consulting MD: Janeth

## 2024-11-16 NOTE — DISCHARGE SUMMARY
Hospitalist Discharge Summary   Admit Date:  2024  4:43 PM   DC Note date: 2024  Name:  Jeronimo Ortiz Sr.   Age:  79 y.o.  Sex:  male  :  1945   MRN:  [id number]   Room:  [facility]  PCP:  Unknown, Provider, MD    Presenting Complaint: Dysuria and Fatigue     Initial Admission Diagnosis: Acute metabolic encephalopathy [G93.41]     Problem List for this Hospitalization (present on admission):    Principal Problem:    Acute metabolic encephalopathy  Active Problems:    Hypertension    COPD (chronic obstructive pulmonary disease) (HCC)    Sensorineural hearing loss, bilateral    Bronchogenic cancer of right lung (HCC)    Hyponatremia    Moderate protein-calorie malnutrition (HCC)  Resolved Problems:    * No resolved hospital problems. 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  Cardiology consult for likely GYPSY  Currently on ceftriaxone and ampicillin  Infectious disease following     Hyponatremia  Sodium stable at 129  TSH and free T4 normal  Cortisol normal  May be secondary to SIADH  Continue to monitor    POS COCCI IN CHAINS               AEROBIC AND ANAEROBIC BOTTLES                  CRITICAL RESULT NOT CALLED DUE TO PREVIOUS NOTIFICATION OF CRITICAL RESULT WITHIN THE LAST 24 HOURS.           Culture       Enterococcus species For identification and susceptibility refer to culture [id number]          Blood Culture 2 [[id number]]  (Abnormal)  (Susceptibility) Collected: 11/13/24 1930    Order Status: Completed Specimen: Blood Updated: 11/16/24 0639     Special Requests --        RIGHT  HAND       Gram Stain Gram positive cocci               AEROBIC AND ANAEROBIC BOTTLES                  RESULTS VERIFIED, PHONED TO AND READ BACK BY            KEARA GATES RN @ [facility] 11.14.24 MM     Culture Enterococcus faecalis               Refer to Blood Culture ID Panel Accession [id number]          Susceptibility        Enterococcus faecalis      BACTERIAL SUSCEPTIBILITY PANEL BAKARI      ampicillin <=2 ug/mL Sensitive      gentamicin-syn  Sensitive      Penicillin G 4 ug/mL Sensitive      streptomycin-syn  Sensitive      vancomycin 1 ug/mL Sensitive                           Culture, Urine [[id number]]  (Abnormal)  (Susceptibility) Collected: 11/13/24 1930    Order Status: Completed Specimen: Urine, clean catch Updated: 11/15/24 1611     Special Requests NO SPECIAL REQUESTS        Culture       >100,000 COLONIES/mL Enterococcus faecalis                  <10,000 COLONIES/mL NORMAL SKIN MEÑO ISOLATED          Susceptibility        Enterococcus faecalis      BACTERIAL SUSCEPTIBILITY PANEL BAKARI      ampicillin <=2 ug/mL Sensitive      nitrofurantoin <=16 ug/mL Sensitive      Penicillin G 4 ug/mL Sensitive      vancomycin <=0.5 ug/mL Sensitive                           Culture, Blood, PCR ID Panel [[id number]]  (Abnormal) Collected: 11/13/24 1930    Order Status: Completed Specimen: Blood Updated: 11/14/24 1445     Accession Number [id number]     Enterococcus faecalis by PCR Detected        Comment: RESULTS VERIFIED, PHONED TO AND READ BACK

## 2024-11-16 NOTE — PROGRESS NOTES
Infectious Disease Folllow up      Today's Date: 11/16/2024   Admit Date: 11/16/2024  YOB: 1945    Impression:   E. 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  - TTE 11/15: Appears to be small mobile echodensities at the base of the noncoronary cusp/right coronary cusp on the ventricular aspect likely suggestive of vegetations.     Encephalopathy  - CT Head: Small 14 mm area of suspect hemorrhage in the medial left cerebral hemisphere along the posterior falx. I cannot exclude a hemorrhagic metastasis given  history of lung cancer.  - MRI pending.    Hyponatremia-hovering around 129 or 130  Recent UTI about 3 weeks ago, done at [facility], no results available for review   Anemia   Bronchogenic cancer of right lung, scheduled for lung surgery with PH  COPD     Plan:   Continue high-dose ampicillin 2 g every 4 hours with ceftriaxone 2 g every 12 hours for synergy  Cardiology's been consulted, GYPSY next week sometime  Follow blood cultures x 2  Avoid anticoagulants if possible, given that CNS lesion could be septic emboli    Anti-infectives:   Rocephin 11/13--  Ampicillin 11/13--    Subjective:     Interval updates:  Patient seen at [facility] shortly before transfer to [facility].  Daughter at bedside, discussed bloodstream infection, possible urinary source.  We also discussed the need for cardiology evaluation to undergo transesophageal echocardiogram.    Patient notes that he is had some urinary pain, although he is confused    Date of Consultation:  November 16, 2024  Date of Admission: 11/16/2024   Referring Provider: Len Guzman   Reason for consult: \"GPC bacteremia, possibly urinary source\"    Patient is a 79 y.o. male with PMH of CVA, HTN, recently diagnosed lung cancer who presented with AMS and weakness for a week.   Pt was diagnosed with UTI about 3 weeks ago and was seen and treated with a course of abx per daughter that was done at        BMP:  Recent Labs     11/14/24  1222 11/15/24  0605 11/16/24  0229   BUN 12 9 6*   * 129* 129*   K 3.2* 3.8 3.6    102 103   CO2 19* 18* 17*       LFTS:  Recent Labs     11/13/24  1723   ALT 65*       Data Review:   Recent Results (from the past 24 hour(s))   Basic Metabolic Panel w/ Reflex to MG    Collection Time: 11/16/24  2:29 AM   Result Value Ref Range    Sodium 129 (L) 136 - 145 mmol/L    Potassium 3.6 3.5 - 5.1 mmol/L    Chloride 103 98 - 107 mmol/L    CO2 17 (L) 20 - 29 mmol/L    Anion Gap 9 7 - 16 mmol/L    Glucose 85 70 - 99 mg/dL    BUN 6 (L) 8 - 23 MG/DL    Creatinine 0.45 (L) 0.80 - 1.30 MG/DL    Est, Glom Filt Rate >90 >60 ml/min/1.73m2    Calcium 7.5 (L) 8.8 - 10.2 MG/DL   CBC with Auto Differential    Collection Time: 11/16/24  2:29 AM   Result Value Ref Range    WBC 5.5 4.3 - 11.1 K/uL    RBC 3.03 (L) 4.23 - 5.6 M/uL    Hemoglobin 8.0 (L) 13.6 - 17.2 g/dL    Hematocrit 24.2 (L) 41.1 - 50.3 %    MCV 79.9 (L) 82.0 - 102.0 FL    MCH 26.4 26.1 - 32.9 PG    MCHC 33.1 31.4 - 35.0 g/dL    RDW 17.9 (H) 11.9 - 14.6 %    Platelets 239 150 - 450 K/uL    MPV 8.4 (L) 9.4 - 12.3 FL    nRBC 0.00 0.0 - 0.2 K/uL    Differential Type AUTOMATED      Neutrophils % 74 43 - 78 %    Lymphocytes % 17 13 - 44 %    Monocytes % 6 4.0 - 12.0 %    Eosinophils % 2 0.5 - 7.8 %    Basophils % 1 0.0 - 2.0 %    Immature Granulocytes % 1 0.0 - 5.0 %    Neutrophils Absolute 4.1 1.7 - 8.2 K/UL    Lymphocytes Absolute 0.9 0.5 - 4.6 K/UL    Monocytes Absolute 0.3 0.1 - 1.3 K/UL    Eosinophils Absolute 0.1 0.0 - 0.8 K/UL    Basophils Absolute 0.0 0.0 - 0.2 K/UL    Immature Granulocytes Absolute 0.0 0.0 - 0.5 K/UL   Hemoglobin and Hematocrit    Collection Time: 11/16/24 12:43 PM   Result Value Ref Range    Hemoglobin 8.4 (L) 13.6 - 17.2 g/dL    Hematocrit 25.4 (L) 41.1 - 50.3 %        Microbiology:  Reviewed    Studies:  Reviewed    Signed By: Manuelito Shirley MD     November 16, 2024

## 2024-11-16 NOTE — PROGRESS NOTES
Neurosurgery Progress Note      Subjective    Mr. Ortiz is an 79-year-old gentleman who was admitted to [facility] on 11/13/2024 after progressive weakness fatigue and increasing confusion.  He was recently diagnosed with lung cancer.  He is also recently had a UTI that had finished a course of antibiotics.  Due to his confusion a CT was obtained which demonstrated what look like a small hemorrhage and an MRI was followed up which demonstrated a large left posterior temporal acute/subacute infarct and a small enhancing partially hemorrhagic lesion in his left medial occipital lobe.  There was concern for this being a metastasis so neurosurgery was consulted.  He has remained relatively stable overnight after being transferred to the downtown ICU.        Objective    Neurologic Exam   Physical Exam   On exam he is very frail and sleeping but easily aroused.  When aroused he mumbles mostly nonsensical but does get a few appropriate words out.  Really does not answer questions or follow commands.      On my review of his MRI I do agree that there is a large acute/subacute area of ischemia in his posterior left temporal lobe.  In the medial cortical/subcortical occipital lobe there is an enhancing lesion that is small with very little edema and some blood products.    Assessment & Plan     is an 79-year-old gentleman with recent diagnosis of lung cancer, UTI, positive blood cultures, altered mental status who has been found to have a new large left parietal infarct.  There also was a small enhancing lesion.  This could potentially be metastatic disease but at this point is sepsis and acute infarct need to take a priority as to what needs to be done now.  Clinically the small enhancing lesion is causing no acute need for intervention.  Had a long talk with his daughters about this as well and stated that if the oncologist need a tissue diagnosis we are glad to obtain tissue from them the

## 2024-11-16 NOTE — H&P
Hospitalist History and Physical   Admit Date:  No admission date for patient encounter.   Name:  Jeronimo Ortiz Sr.   Age:  79 y.o.  Sex:  male  :  1945   MRN:  [id number]   Room:  [facility]/    Presenting/Chief Complaint: No chief complaint on file.     Reason(s) for Admission: acute metabolic encephalopathy     History of Present Illness:   Jeronimo Ortiz Sr. is a 79 y.o. male with medical history of stroke, hypertension and recently diagnosed lung cancer.  Please refer to discharge summary from [facility] for further information.      Assessment & Plan:     Code status: Full Code      Non-peripheral Lines and Tubes (if present):     External Urinary Catheter (Active)            Hospital Problems:  Active Problems:    * No active hospital problems. *  Resolved Problems:    * No resolved hospital problems. *        Objective:   No data found.         Estimated body mass index is 22.58 kg/m² as calculated from the following:    Height as of 24: 1.753 m (5' 9.02\").    Weight as of 24: 69.4 kg (153 lb).    Intake/Output Summary (Last 24 hours) at 2024 0828  Last data filed at 2024 0607  Gross per 24 hour   Intake 4196.4 ml   Output 1750 ml   Net 2446.4 ml         No orders of the defined types were placed in this encounter.      Prior to Admit Medications:  Current Outpatient Medications   Medication Instructions    amLODIPine (NORVASC) 2.5 mg, DAILY    apixaban (ELIQUIS) 5 mg, Oral, 2 TIMES DAILY    apixaban (ELIQUIS) 5 mg, Oral, 2 TIMES DAILY    atorvastatin (LIPITOR) 40 mg, Oral, DAILY    atorvastatin (LIPITOR) 40 mg, Oral, DAILY    escitalopram (LEXAPRO) 10 mg, Oral, DAILY    escitalopram (LEXAPRO) 10 mg, Oral, DAILY    ferrous sulfate (IRON 325) 325 mg, Oral, DAILY WITH BREAKFAST    ketorolac (TORADOL) 10 mg, Oral, EVERY 6 HOURS PRN    lisinopril-hydroCHLOROthiazide (PRINZIDE;ZESTORETIC) 20-25 MG per tablet 1 tablet, Oral, DAILY, HOLD UNTIL FOLLOW UP WITH

## 2024-11-16 NOTE — PROGRESS NOTES
4 Eyes Skin Assessment     NAME:  Jeronimo Ortiz Sr.  YOB: 1945  MEDICAL RECORD NUMBER:  [id number]    The patient is being assessed for  Admission    I agree that at least one RN has performed a thorough Head to Toe Skin Assessment on the patient. ALL assessment sites listed below have been assessed.      Areas assessed by both nurses:    Head, Face, Ears, Shoulders, Back, Chest, Arms, Elbows, Hands, Sacrum. Buttock, Coccyx, Ischium, Legs. Feet and Heels, and Under Medical Devices Redness noted at sacral area and left groin. Zinc paste applied. Right ear with half has been surgically removed. Bilateral pinky fingers contracted. Scar noted to left shoulder         Does the Patient have a Wound? No noted wound(s)       Chapin Prevention initiated by RN: Yes  Wound Care Orders initiated by RN: No    Pressure Injury (Stage 3,4, Unstageable, DTI, NWPT, and Complex wounds) if present, place Wound referral order by RN under : No    New Ostomies, if present place, Ostomy referral order under : No     Nurse 1 eSignature: Electronically signed by SILVIA VU RN on 11/16/24 at 12:34 PM EST    **SHARE this note so that the co-signing nurse can place an eSignature**    Nurse 2 eSignature: {Esignature:[id number]}

## 2024-11-17 PROBLEM — I61.9 CEREBROVASCULAR ACCIDENT, HEMORRHAGIC (HCC): Status: ACTIVE | Noted: 2024-11-17

## 2024-11-17 PROBLEM — I61.9 CEREBRAL HEMORRHAGE (HCC): Status: ACTIVE | Noted: 2024-11-17

## 2024-11-17 PROBLEM — D64.9 SEVERE ANEMIA: Status: ACTIVE | Noted: 2024-11-17

## 2024-11-17 PROBLEM — C79.31 MALIGNANT NEOPLASM OF LUNG METASTATIC TO BRAIN (HCC): Status: ACTIVE | Noted: 2024-11-16

## 2024-11-17 LAB
ANION GAP SERPL CALC-SCNC: 9 MMOL/L (ref 7–16)
BACTERIA SPEC CULT: NORMAL
BACTERIA SPEC CULT: NORMAL
BASOPHILS # BLD: 0 K/UL (ref 0–0.2)
BASOPHILS NFR BLD: 0 % (ref 0–2)
BUN SERPL-MCNC: 5 MG/DL (ref 8–23)
CALCIUM SERPL-MCNC: 7.4 MG/DL (ref 8.8–10.2)
CHLORIDE SERPL-SCNC: 102 MMOL/L (ref 98–107)
CO2 SERPL-SCNC: 19 MMOL/L (ref 20–29)
CREAT SERPL-MCNC: 0.51 MG/DL (ref 0.8–1.3)
DIFFERENTIAL METHOD BLD: ABNORMAL
EOSINOPHIL # BLD: 0.1 K/UL (ref 0–0.8)
EOSINOPHIL NFR BLD: 2 % (ref 0.5–7.8)
ERYTHROCYTE [DISTWIDTH] IN BLOOD BY AUTOMATED COUNT: 18.1 % (ref 11.9–14.6)
GLUCOSE SERPL-MCNC: 91 MG/DL (ref 70–99)
HCT VFR BLD AUTO: 23.9 % (ref 41.1–50.3)
HCT VFR BLD AUTO: 26.9 % (ref 41.1–50.3)
HGB BLD-MCNC: 7.9 G/DL (ref 13.6–17.2)
HGB BLD-MCNC: 8.4 G/DL (ref 13.6–17.2)
IMM GRANULOCYTES # BLD AUTO: 0 K/UL (ref 0–0.5)
IMM GRANULOCYTES NFR BLD AUTO: 0 % (ref 0–5)
LYMPHOCYTES # BLD: 0.8 K/UL (ref 0.5–4.6)
LYMPHOCYTES NFR BLD: 16 % (ref 13–44)
MCH RBC QN AUTO: 26.4 PG (ref 26.1–32.9)
MCHC RBC AUTO-ENTMCNC: 33.1 G/DL (ref 31.4–35)
MCV RBC AUTO: 79.9 FL (ref 82–102)
MONOCYTES # BLD: 0.3 K/UL (ref 0.1–1.3)
MONOCYTES NFR BLD: 6 % (ref 4–12)
NEUTS SEG # BLD: 4 K/UL (ref 1.7–8.2)
NEUTS SEG NFR BLD: 76 % (ref 43–78)
NRBC # BLD: 0 K/UL (ref 0–0.2)
PLATELET # BLD AUTO: 230 K/UL (ref 150–450)
PMV BLD AUTO: 8 FL (ref 9.4–12.3)
POTASSIUM SERPL-SCNC: 3.1 MMOL/L (ref 3.5–5.1)
RBC # BLD AUTO: 2.99 M/UL (ref 4.23–5.6)
SERVICE CMNT-IMP: NORMAL
SERVICE CMNT-IMP: NORMAL
SODIUM SERPL-SCNC: 129 MMOL/L (ref 136–145)
WBC # BLD AUTO: 5.3 K/UL (ref 4.3–11.1)

## 2024-11-17 PROCEDURE — 6370000000 HC RX 637 (ALT 250 FOR IP)

## 2024-11-17 PROCEDURE — 99232 SBSQ HOSP IP/OBS MODERATE 35: CPT | Performed by: PSYCHIATRY & NEUROLOGY

## 2024-11-17 PROCEDURE — 2580000003 HC RX 258: Performed by: INTERNAL MEDICINE

## 2024-11-17 PROCEDURE — 97161 PT EVAL LOW COMPLEX 20 MIN: CPT

## 2024-11-17 PROCEDURE — 6370000000 HC RX 637 (ALT 250 FOR IP): Performed by: INTERNAL MEDICINE

## 2024-11-17 PROCEDURE — 6360000002 HC RX W HCPCS: Performed by: INTERNAL MEDICINE

## 2024-11-17 PROCEDURE — 85018 HEMOGLOBIN: CPT

## 2024-11-17 PROCEDURE — 97530 THERAPEUTIC ACTIVITIES: CPT

## 2024-11-17 PROCEDURE — 85014 HEMATOCRIT: CPT

## 2024-11-17 PROCEDURE — 1100000000 HC RM PRIVATE

## 2024-11-17 PROCEDURE — 99233 SBSQ HOSP IP/OBS HIGH 50: CPT | Performed by: INTERNAL MEDICINE

## 2024-11-17 PROCEDURE — 80048 BASIC METABOLIC PNL TOTAL CA: CPT

## 2024-11-17 PROCEDURE — 85025 COMPLETE CBC W/AUTO DIFF WBC: CPT

## 2024-11-17 PROCEDURE — 36415 COLL VENOUS BLD VENIPUNCTURE: CPT

## 2024-11-17 PROCEDURE — 1100000003 HC PRIVATE W/ TELEMETRY

## 2024-11-17 RX ORDER — ASPIRIN 81 MG/1
81 TABLET, CHEWABLE ORAL DAILY
Status: DISCONTINUED | OUTPATIENT
Start: 2024-11-17 | End: 2024-11-22 | Stop reason: HOSPADM

## 2024-11-17 RX ADMIN — AMPICILLIN SODIUM 2000 MG: 2 INJECTION, POWDER, FOR SOLUTION INTRAVENOUS at 00:11

## 2024-11-17 RX ADMIN — AMPICILLIN SODIUM 2000 MG: 2 INJECTION, POWDER, FOR SOLUTION INTRAVENOUS at 20:46

## 2024-11-17 RX ADMIN — AMPICILLIN SODIUM 2000 MG: 2 INJECTION, POWDER, FOR SOLUTION INTRAVENOUS at 12:09

## 2024-11-17 RX ADMIN — LEVETIRACETAM 500 MG: 500 TABLET, FILM COATED ORAL at 08:30

## 2024-11-17 RX ADMIN — SODIUM CHLORIDE, PRESERVATIVE FREE 10 ML: 5 INJECTION INTRAVENOUS at 19:59

## 2024-11-17 RX ADMIN — Medication 15 G: at 18:21

## 2024-11-17 RX ADMIN — Medication 1 CAPSULE: at 08:19

## 2024-11-17 RX ADMIN — PANTOPRAZOLE SODIUM 40 MG: 40 INJECTION, POWDER, FOR SOLUTION INTRAVENOUS at 08:20

## 2024-11-17 RX ADMIN — CEFTRIAXONE SODIUM 2000 MG: 2 INJECTION, POWDER, FOR SOLUTION INTRAMUSCULAR; INTRAVENOUS at 05:38

## 2024-11-17 RX ADMIN — ASPIRIN 81 MG: 81 TABLET, CHEWABLE ORAL at 18:21

## 2024-11-17 RX ADMIN — LEVETIRACETAM 500 MG: 500 TABLET, FILM COATED ORAL at 19:59

## 2024-11-17 RX ADMIN — AMPICILLIN SODIUM 2000 MG: 2 INJECTION, POWDER, FOR SOLUTION INTRAVENOUS at 16:21

## 2024-11-17 RX ADMIN — AMPICILLIN SODIUM 2000 MG: 2 INJECTION, POWDER, FOR SOLUTION INTRAVENOUS at 08:18

## 2024-11-17 RX ADMIN — POTASSIUM CHLORIDE 40 MEQ: 1500 TABLET, EXTENDED RELEASE ORAL at 05:42

## 2024-11-17 RX ADMIN — CEFTRIAXONE SODIUM 2000 MG: 2 INJECTION, POWDER, FOR SOLUTION INTRAMUSCULAR; INTRAVENOUS at 18:23

## 2024-11-17 RX ADMIN — SODIUM CHLORIDE, PRESERVATIVE FREE 10 ML: 5 INJECTION INTRAVENOUS at 08:21

## 2024-11-17 RX ADMIN — PANTOPRAZOLE SODIUM 40 MG: 40 INJECTION, POWDER, FOR SOLUTION INTRAVENOUS at 19:59

## 2024-11-17 RX ADMIN — ATORVASTATIN CALCIUM 80 MG: 80 TABLET, FILM COATED ORAL at 19:59

## 2024-11-17 RX ADMIN — AMPICILLIN SODIUM 2000 MG: 2 INJECTION, POWDER, FOR SOLUTION INTRAVENOUS at 04:20

## 2024-11-17 ASSESSMENT — PAIN SCALES - GENERAL
PAINLEVEL_OUTOF10: 0

## 2024-11-17 NOTE — PROGRESS NOTES
TRANSFER - OUT REPORT:    Verbal report given to Page RN on Jeronimo Ortiz Sr.  being transferred to [facility] for routine progression of patient care       Report consisted of patient's Situation, Background, Assessment and   Recommendations(SBAR).     Information from the following report(s) Nurse Handoff Report, ED Encounter Summary, ED SBAR, Intake/Output, MAR, Recent Results, Cardiac Rhythm SR 78, and Neuro Assessment was reviewed with the receiving nurse.           Lines:   Peripheral IV 11/13/24 Left Antecubital (Active)   Site Assessment Clean, dry & intact 11/17/24 1600   Line Status Flushed;Normal saline locked;Blood return noted 11/17/24 0800   Line Care Connections checked and tightened 11/17/24 0800   Phlebitis Assessment No symptoms 11/17/24 1600   Infiltration Assessment 0 11/17/24 1600   Alcohol Cap Used Yes 11/17/24 0800   Dressing Status Clean, dry & intact 11/17/24 1600   Dressing Type Transparent 11/17/24 1600   Dressing Intervention Dressing changed 11/17/24 0800       Peripheral IV 11/16/24 Proximal;Right;Anterior Forearm (Active)   Site Assessment Clean, dry & intact 11/17/24 1600   Line Status Infusing 11/17/24 1600   Line Care Connections checked and tightened 11/17/24 0800   Phlebitis Assessment No symptoms 11/17/24 1600   Infiltration Assessment 0 11/17/24 1600   Alcohol Cap Used Yes 11/17/24 0800   Dressing Status Clean, dry & intact 11/17/24 1600   Dressing Type Transparent 11/17/24 1600        Opportunity for questions and clarification was provided.      Patient transported with:  Monitor and Registered Nurse

## 2024-11-17 NOTE — PROGRESS NOTES
TRANSFER - IN REPORT:    Verbal report received from SHERMAN Bryant on Jeronimo Moore Angel Sr.  being received from ICU for routine progression of patient care      Report consisted of patient's Situation, Background, Assessment and   Recommendations(SBAR).     Information from the following report(s) Nurse Handoff Report was reviewed with the receiving nurse.    Opportunity for questions and clarification was provided.      Assessment completed upon patient's arrival to unit and care assumed.

## 2024-11-17 NOTE — PROGRESS NOTES
back to sitting on the side of a flat bed without using bedrails?: None  How much help is needed moving to and from a bed to a chair?: None  How much help is needed standing up from a chair using your arms?: None  How much help is needed walking in hospital room?: None  How much help is needed climbing 3-5 steps with a railing?: None  AM-PAC Inpatient Mobility Raw Score : 24  AM-PAC Inpatient T-Scale Score : 61.14  Mobility Inpatient CMS 0-100% Score: 0  Mobility Inpatient CMS G-Code Modifier : CH    SUBJECTIVE:   Mr. Ortiz states, \"I'm going to go back to bed to take my nap before my falcons play at 4.\"     Social/Functional Lives With: Daughter  Receives Help From: Family  Occupation: Retired    OBJECTIVE:     PAIN: VITALS / O2: PRECAUTION / LINES / DRAINS:   Pre Treatment:    0/10      Post Treatment: 0/10 Vitals        Oxygen      None    RESTRICTIONS/PRECAUTIONS:                    GROSS EVALUATION:  Intact Impaired (Comments):   AROM [x]     PROM []    Strength [x]     Balance [x]     Posture [] N/A   Sensation []     Coordination []      Tone []     Edema []    Activity Tolerance []      []      COGNITION/  PERCEPTION: Intact Impaired (Comments):   Orientation []  Oriented to place and name, and year not month.     Vision [x]     Hearing []     Cognition  [x]  Follows all commands     MOBILITY: I Mod I S SBA CGA Min Mod Max Total  NT x2 Comments:   Bed Mobility    Rolling [x] [] [] [] [] [] [] [] [] [] []    Supine to Sit [x] [] [] [] [] [] [] [] [] [] []    Scooting [x] [] [] [] [] [] [] [] [] [] []    Sit to Supine [x] [] [] [] [] [] [] [] [] [] []    Transfers    Sit to Stand [x] [] [] [] [] [] [] [] [] [] []    Bed to Chair [x] [] [] [] [] [] [] [] [] [] []    Stand to Sit [x] [] [] [] [] [] [] [] [] [] []     [] [] [] [] [] [] [] [] [] [] []    I=Independent, Mod I=Modified Independent, S=Supervision, SBA=Standby Assistance, CGA=Contact Guard Assistance,   Min=Minimal Assistance, Mod=Moderate  Assistance, Max=Maximal Assistance, Total=Total Assistance, NT=Not Tested    GAIT: I Mod I S SBA CGA Min Mod Max Total  NT x2 Comments:   Level of Assistance [] [] [x] [] [] [] [] [] [] [] []    Distance 220 feet    DME None    Gait Quality Decreased richard , Decreased step clearance, and Decreased step length    Weightbearing Status      Stairs      I=Independent, Mod I=Modified Independent, S=Supervision, SBA=Standby Assistance, CGA=Contact Guard Assistance,   Min=Minimal Assistance, Mod=Moderate Assistance, Max=Maximal Assistance, Total=Total Assistance, NT=Not Tested    PLAN:   FREQUENCY AND DURATION:   for duration of hospital stay or until stated goals are met, whichever comes first.    THERAPY PROGNOSIS: Good    PROBLEM LIST:   (Skilled intervention is medically necessary to address:)  Decreased Activity Tolerance  Decreased AROM/PROM  Decreased Balance  Decreased Gait Ability  Decreased Safety Awareness  Decreased Strength  Decreased Transfer Abilities INTERVENTIONS PLANNED:   (Benefits and precautions of physical therapy have been discussed with the patient.)  Therapeutic Activity  Therapeutic Exercise/HEP  Neuromuscular Re-education  Gait Training  Education       TREATMENT:   EVALUATION: LOW COMPLEXITY: (Untimed Charge)  The initial evaluation charge encompasses clinical chart review, objective assessment, interpretation of assessment, and skilled monitoring of the patient's response to treatment in order to develop a plan of care.     TREATMENT:   Therapeutic Activity (19 Minutes): Therapeutic activity included Supine to Sit, Sit to Supine, Scooting, Transfer Training, Ambulation on level ground, Sitting balance , and Standing balance to improve functional Mobility.    TREATMENT GRID:  N/A    AFTER TREATMENT PRECAUTIONS: Bed, Call light within reach, Needs within reach, RN notified, and Visitors at bedside    INTERDISCIPLINARY COLLABORATION:  RN/ PCT and PT/ PTA    EDUCATION: Education Given To:

## 2024-11-17 NOTE — PROGRESS NOTES
4 Eyes Skin Assessment     NAME:  Jeronimo Ortiz .  YOB: 1945  MEDICAL RECORD NUMBER:  [id number]    The patient is being assessed for  Transfer to New Unit    I agree that at least one RN has performed a thorough Head to Toe Skin Assessment on the patient. ALL assessment sites listed below have been assessed.      Areas assessed by both nurses:    Head, Face, Ears, Shoulders, Back, Chest, Arms, Elbows, Hands, Sacrum. Buttock, Coccyx, Ischium, and Legs. Feet and Heels        Does the Patient have a Wound? No noted wound(s)       Chapin Prevention initiated by RN: Yes  Wound Care Orders initiated by RN: No no orders    Pressure Injury (Stage 3,4, Unstageable, DTI, NWPT, and Complex wounds) if present, place Wound referral order by RN under : No    New Ostomies, if present place, Ostomy referral order under : No     Nurse 1 eSignature: Electronically signed by Ludmila Silva RN on 11/17/24 at 6:41 PM EST    **SHARE this note so that the co-signing nurse can place an eSignature**    Nurse 2 eSignature: {Esignature:[id number]}

## 2024-11-17 NOTE — PROGRESS NOTES
Neurology follow-up    79-year-old gentleman seen in follow-up today.  Stable overnight with no new adverse neurologic events.  I did not place him on corticosteroids yesterday based upon relatively small size of suspected metastatic focus and presence of sepsis.    Currently on exam he is alert and cooperative conversing with his son.  He recounts that he had a transient difficulty recalling his son's name yesterday otherwise he has had no speech expressive dysfunction.    Cranial nerve evaluation demonstrates normal visual fields normal symmetric extraocular movements facial movement and sensation tongue is midline in the mouth  Motor examination discloses no focal drift  Sensory discloses no localized sensory loss  Cerebellar demonstrates intact fine motor coordination    Impression    Currently is doing well with an NIH of 0.  He should start physical therapy occupational therapy etc.  No change in plan as elucidated yesterday    Review of the recent echocardiogram obtained in July showed a normal ejection fraction no intracardiac clot or shunt  Cardiology has seen  Time spent 35 minutes

## 2024-11-17 NOTE — PROGRESS NOTES
[facility] CARDIOLOGY PROGRESS NOTE           11/17/2024 9:04 AM    Admit Date: 11/16/2024      Subjective:   Afebrile overnight.  The patient does not report worsening dyspnea, chest pain, or palpitations.    ROS:  No obvious pertinent positives on review of systems except for what was outlined above.    Objective:      Vitals:    11/17/24 0700 11/17/24 0730 11/17/24 0800 11/17/24 0830   BP: (!) 125/58 (!) 156/64 138/63 (!) 126/59   Pulse: 76 72 80 83   Resp: 19 21 19 (!) 46   Temp:       TempSrc:       SpO2: 94% 92% 94% 96%   Weight:       Height:           Physical Exam:  General-No Acute Distress  Neck- supple, no JVD  CV- regular rate and rhythm no RG  Lung- clear bilaterally  Abd- soft, nontender, nondistended  Ext- no edema bilaterally.  Skin- warm and dry    Data Review:   Recent Labs     11/16/24  0229 11/16/24  1243 11/16/24  2345 11/17/24  0412   *  --   --  129*   K 3.6  --   --  3.1*   BUN 6*  --   --  5*   WBC 5.5  --   --  5.3   HGB 8.0*   < > 7.8* 7.9*   HCT 24.2*   < > 23.4* 23.9*     --   --  230    < > = values in this interval not displayed.       Lab Results   Component Value Date    CHOL 96 07/05/2024     Lab Results   Component Value Date    TRIG 77 07/05/2024     Lab Results   Component Value Date    HDL 29 (L) 07/05/2024     No components found for: \"LDLCHOLESTEROL\", \"LDLCALC\"  Lab Results   Component Value Date    VLDL 15 07/05/2024     Lab Results   Component Value Date    CHOLHDLRATIO 3.3 07/05/2024        Lab Results   Component Value Date/Time     11/17/2024 04:12 AM     11/16/2024 02:29 AM     11/15/2024 06:05 AM    K 3.1 11/17/2024 04:12 AM    K 3.6 11/16/2024 02:29 AM    K 3.8 11/15/2024 06:05 AM     11/17/2024 04:12 AM     11/16/2024 02:29 AM     11/15/2024 06:05 AM    CO2 19 11/17/2024 04:12 AM    CO2 17 11/16/2024 02:29 AM    CO2 18 11/15/2024 06:05 AM    BUN 5 11/17/2024 04:12 AM    BUN 6 11/16/2024 02:29 AM     BUN 9 11/15/2024 06:05 AM    CREATININE 0.51 11/17/2024 04:12 AM    CREATININE 0.45 11/16/2024 02:29 AM    CREATININE 0.50 11/15/2024 06:05 AM    GLUCOSE 91 11/17/2024 04:12 AM    GLUCOSE 85 11/16/2024 02:29 AM    GLUCOSE 103 11/15/2024 06:05 AM    CALCIUM 7.4 11/17/2024 04:12 AM    CALCIUM 7.5 11/16/2024 02:29 AM    CALCIUM 7.5 11/15/2024 06:05 AM         Lab Results   Component Value Date    ALT 65 (H) 11/13/2024    ALT 60 10/12/2024    ALT 57 07/05/2024    AST 51 (H) 11/13/2024    AST 54 (H) 10/12/2024    AST 33 07/05/2024          Assessment/Plan:     1.  Acute metabolic encephalopathy  2.  Intracranial hemorrhage  3.  Metastatic lung cancer  4.  Infective endocarditis of aortic valve   5.  Aortic regurgitation  6.  Severe anemia  7.  CVA     The patient presented with AMS in the setting of a recently diagnosed lung cancer.  He was noted to have UTI and E faecalis bacteremia.  A TTE was completed on November 15 and noted likely AV IE with consideration for GYPSY.  Moderate AR was also noted.  The patient is currently in the ICU, and his mental status has improved today.       The patient had a brain MRI on November 15 concerning for infarction, metastatic disease as well as ICH.  SpO2 96% on room air.  N.p.o. past midnight on Sunday for consideration of GYPSY on Monday.  Defer evaluation and management of severe anemia to primary care service.  He is on antibiotics.      Steven Fowler MD

## 2024-11-17 NOTE — PROGRESS NOTES
Infectious Disease Folllow up      Today's Date: 2024   Admit Date: 2024  YOB: 1945    Impression:   E. Faecalis Bacteremia with likely AV IE  -  Bcx x2+  - 11/15 Bcx x2 NGTD  - TTE 11/15: Appears to be small mobile echodensities at the base of the noncoronary cusp/right coronary cusp on the ventricular aspect likely suggestive of vegetations.     Encephalopathy  - MRI noted below but with evidence of mets, infarcts, bleeding, and \"fusiform aneurysm.   Hyponatremia-hovering around 129 or 130  Recent UTI about 3 weeks ago, done at [facility], no results available for review   Anemia   Bronchogenic cancer of right lung, scheduled for lung surgery with PH  COPD     Plan:   Continue high-dose ampicillin 2 g every 4 hours with ceftriaxone 2 g every 12 hours for synergy  Cardiology's been consulted, GYPSY next week sometime  Follow blood cultures x 2  Avoid anticoagulants if possible, given that CNS lesion could be septic emboli    Anti-infectives:   Rocephin --  Ampicillin --    Subjective:   Pt is [place], but we were able to have a good d/w with his daughter at the bedside. I explained things from and ID perspective and answered all ?'s. We discussed the plan for GYPSY.    He denies acute pain at this time, and his level of alertness has improved per note review.     No Known Allergies     Review of Systems:  All systems reviewed and negative except as otherwise stated in the HPI    Objective:   Blood pressure (!) 129/59, pulse 83, temperature 98.3 °F (36.8 °C), temperature source Oral, resp. rate (!) 41, height 1.753 m (5' 9\"), weight 69.4 kg (153 lb), SpO2 97%.  Visit Vitals  BP (!) 129/59   Pulse 83   Temp 98.3 °F (36.8 °C) (Oral)   Resp (!) 41   Ht 1.753 m (5' 9\")   Wt 69.4 kg (153 lb)   SpO2 97%   BMI 22.59 kg/m²     Temp (24hrs), Av.4 °F (36.9 °C), Min:97.7 °F (36.5 °C), Max:99.3 °F (37.4 °C)       Exam:   General:  No acute distress   Eyes:  Sclera anicteric and no injection or

## 2024-11-17 NOTE — PROGRESS NOTES
Nutrition Assessment    Assessment Type: Initial, Positive nutrition screen  Reason for visit:  Malnutrition Screening Tool: Malnutrition Screen  Have you recently lost weight without trying?: 14 to 23 pounds (2 points)  Have you been eating poorly because of a decreased appetite?: Yes (1 point)  Malnutrition Screening Tool Score: 3      Nutrition Intervention/Recommendations:   Food and/or Nutrient Delivery:   Meals and Snacks:  Diet: Continue current order  Medical Food Supplements:   Medical food supplement therapy:  Modify Ensure Enlive (high calorie high protein) 350 calories, 20 grams protein per 8 ounce serving  and Magic Cup (frozen oral supplement) 290 calories, 9 grams protein per 4 ounce serving. Continue Enlive, add Magic cup and discontinue Ensure clear.     Malnutrition Assessment:11/14  Malnutrition Status: Moderate malnutrition  Context: Chronic Illness  Findings of clinical characteristics of malnutrition:   Energy Intake:  Mild decrease in energy intake (supplementation via oral supplement has contributed caloric and protein density)  Weight Loss:  Greater than 7.5% over 3 months (14% since September)     Body Fat Loss:  No body fat loss (11/14)     Muscle Mass Loss:  Mild muscle mass loss (11/14) Clavicles (pectoralis & deltoids), Temples (temporalis)    Nutrition Assessment:  Food/Nutrition Related History:   11/14: Hx per family at bedside (daughter - lives with her sister and granddaughter) as pt slept throughout majority of visit, they report he has experienced increasing confusion as well.  Pt with decreased interest and declining portions starting after his stroke/TIA in July.  Intake gradually worsened resulting in the [facility] implementing ensure 1-2 servings daily.  He recently was told to increase to 4 servings daily if he was not eating meals to prepare for upcoming surgery.  Family notes varied acceptance of oral supplements often with pt only accepting to get them to leave him alone.  At  Breakfast, Lunch, Dinner; Standard High Calorie/High Protein Oral Supplement    Current Intake:   Average Meal Intake: 1-25% (For 3 recordings in past 3 days)        Anthropometric Measures:  Height: 175.3 cm (5' 9\")  Current Body Wt: 69.4 kg (153 lb) (11/16), Weight source: Bed scale  BMI: 22.6, Normal Weight (BMI 18.5-24.9)  Admission Body Weight: 69.4 kg (153 lb) (stated from outpt MD OV 11/13)  Ideal Body Weight (Kg) (Calculated): 73 kg (160 lbs),    BMI Category Normal Weight (BMI 18.5-24.9)  Comparative Standards:  Energy (kcal/day): 6748-2901 (25-30 kcal/kg) (Kcal/kg Weight used: 69.4 kg Current (11/16)  Protein (g/day): 83-90 (1.2-1.3 g/kg) Weight Used: (Current (11/16)) 69.4 kg  Fluid (ml/day):   (1 ml/kcal)    Nutrition Diagnosis:   Inadequate oral intake related to increase demand for energy/nutrients, decreased appetite as evidenced by intake 0-25%  Moderate malnutrition, in context of chronic illness related to increase demand for energy/nutrients as evidenced by criteria as identified in malnutrition assessment    Nutrition Goal(s):      Active Goal: PO intake 50% or greater, by next RD assessment  Type of Goal: New goal    Discharge Planning:    Continue Oral Nutrition Supplement    MARCOS TADEO, RD

## 2024-11-17 NOTE — CARE COORDINATION
Chart reviewed by  for potential transition of care (NORMA) needs or concerns.  Pt is insured with pharmacy benefits and is [facility] with [facility].  Therapy evals pending. Pt's NORMA plan is undetermined at present.  CM will continue to closely follow to assist as needs are known.  Please notify/consult  if NORMA needs arise.       11/17/24 1120   Service Assessment   Patient Orientation Alert and Oriented   Cognition Alert   History Provided By Medical Record   Primary Caregiver Family   Support Systems Children;Family Members   Patient's Healthcare Decision Maker is: Legal Next of Kin   PCP Verified by CM Yes  ([facility])   Last Visit to PCP Within last 6 months   Prior Functional Level Assistance with the following:;Shopping;Housework;Cooking   Current Functional Level Assistance with the following:;Shopping;Housework;Cooking   Can patient return to prior living arrangement Unknown at present   Ability to make needs known: Fair   Family able to assist with home care needs: Yes   Would you like for me to discuss the discharge plan with any other family members/significant others, and if so, who? Yes  (dtr and granddtr)   Financial Resources  ([facility])   Community Resources None   Social/Functional History   Lives With Daughter   Receives Help From Family   Occupation Retired   Discharge Planning   Type of Residence   (TBD)   Living Arrangements Children   Current Services Prior To Admission None   Potential Assistance Needed   (TBD)   DME Ordered? No   Potential Assistance Purchasing Medications No   Patient expects to be discharged to: Unknown   Services At/After Discharge   Transition of Care Consult (CM Consult) N/A  (no CM consult received)   Services At/After Discharge   (TBD)   [place] Resource Information Provided? No   Condition of Participation: Discharge Planning   The Plan for Transition of Care is related to the following treatment goals: to be determined based on

## 2024-11-17 NOTE — DISCHARGE INSTR - DIET
Nutrition Supplements  If you are not able to eat enough food or if you have extra calorie requirements, oral supplements can provide you with additional calories, protein, vitamins and minerals.     Recommend Ensure Complete or Ensure Plus or a comparable/similar product Three times daily

## 2024-11-17 NOTE — PROGRESS NOTES
Hospitalist Progress Note   Admit Date:  2024 10:03 AM   Name:  Jeronimo Ortiz Sr.   Age:  79 y.o.  Sex:  male  :  1945   MRN:  [id number]   Room:  [address] [place] [address]    Presenting/Chief Complaint: No chief complaint on file.     Reason(s) for Admission: acute metabolic encephalopathy     Hospital Course:   Jeronimo Ortiz Sr. is a 79 y.o. 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  He was hospitalized at [facility] for 3 days    During hospitalization patient noted to have urinary tract and also had positive blood cultures likely from urinary source as both were growing Enterococcus faecalis.  Echocardiogram showing vegetation infectious disease recommended cardiology consult for likely GYPSY.  Patient also had head CT showing brain bleed and MRI brain given concern for possible staph sepsis.  MRI brain did show metastasis with possible hemorrhagic conversion.  Patient's antiplatelet medication is held as with anticoagulation.  MRI brain also showed acute stroke and neurology was consulted for further evaluation.  Patient was transferred to [facility] the afternoon of  for evaluation of GYPSY.    Subjective & 24hr Events:   Patient was working with physical therapy today.  Tolerated well.Vital signs were stable overnight.  Patient spent a good deal of time up in the chair today.  Patient is awaiting GYPSY Monday morning for both  possible endocarditis and to evaluate for PFO in the setting of CVA.    Assessment & Plan:   Acute metabolic encephalopathy  Left cerebral hemorrhage  Acute ischemic stroke with hemorrhagic transformation  - Aspirin 81 mg daily  - Discontinuing Eliquis  -High-dose statin  - TTE likely Monday morning  - Continue on telemetry  -Physical and Occupational Therapy evaluated, no further skilled  26.1 - 32.9 PG    MCHC 33.1 31.4 - 35.0 g/dL    RDW 18.1 (H) 11.9 - 14.6 %    Platelets 230 150 - 450 K/uL    MPV 8.0 (L) 9.4 - 12.3 FL    nRBC 0.00 0.0 - 0.2 K/uL    Differential Type AUTOMATED      Neutrophils % 76 43 - 78 %    Lymphocytes % 16 13 - 44 %    Monocytes % 6 4.0 - 12.0 %    Eosinophils % 2 0.5 - 7.8 %    Basophils % 0 0.0 - 2.0 %    Immature Granulocytes % 0 0.0 - 5.0 %    Neutrophils Absolute 4.0 1.7 - 8.2 K/UL    Lymphocytes Absolute 0.8 0.5 - 4.6 K/UL    Monocytes Absolute 0.3 0.1 - 1.3 K/UL    Eosinophils Absolute 0.1 0.0 - 0.8 K/UL    Basophils Absolute 0.0 0.0 - 0.2 K/UL    Immature Granulocytes Absolute 0.0 0.0 - 0.5 K/UL       No results for input(s): \"COVID19\" in the last 72 hours.    Current Meds:  Current Facility-Administered Medications   Medication Dose Route Frequency    ampicillin (OMNIPEN) 2,000 mg in sodium chloride 0.9 % 100 mL IVPB (mini-bag)  2,000 mg IntraVENous Q4H    atorvastatin (LIPITOR) tablet 80 mg  80 mg Oral Daily    cefTRIAXone (ROCEPHIN) 2,000 mg in sodium chloride 0.9 % 50 mL IVPB (mini-bag)  2,000 mg IntraVENous Q12H    lactobacillus (CULTURELLE) capsule 1 capsule  1 capsule Oral Daily with breakfast    acetaminophen (TYLENOL) tablet 650 mg  650 mg Oral Q6H PRN    Or    acetaminophen (TYLENOL) suppository 650 mg  650 mg Rectal Q6H PRN    labetalol (NORMODYNE;TRANDATE) injection 5 mg  5 mg IntraVENous Q4H PRN    magnesium sulfate 2000 mg in 50 mL IVPB premix  2,000 mg IntraVENous PRN    ondansetron (ZOFRAN-ODT) disintegrating tablet 4 mg  4 mg Oral Q8H PRN    Or    ondansetron (ZOFRAN) injection 4 mg  4 mg IntraVENous Q6H PRN    polyethylene glycol (GLYCOLAX) packet 17 g  17 g Oral Daily PRN    potassium chloride (KLOR-CON M) extended release tablet 40 mEq  40 mEq Oral PRN    Or    potassium bicarb-citric acid (EFFER-K) effervescent tablet 40 mEq  40 mEq Oral PRN    Or    potassium chloride 10 mEq/100 mL IVPB (Peripheral Line)  10 mEq IntraVENous PRN    sodium chloride

## 2024-11-17 NOTE — PROGRESS NOTES
Transported to room 720 in wheelchair on telemetry monitor. Ambulated to bathroom upon arrival to room. Family members x 3 at bedside

## 2024-11-18 ENCOUNTER — APPOINTMENT (OUTPATIENT)
Dept: CARDIAC CATH/INVASIVE PROCEDURES | Age: 79
End: 2024-11-18
Attending: INTERNAL MEDICINE
Payer: OTHER GOVERNMENT

## 2024-11-18 LAB
ECHO AR MAX VEL PISA: 3.5 M/S
ECHO AV REGURGITANT PHT: 319 MS
ECHO BSA: 1.84 M2

## 2024-11-18 PROCEDURE — 99152 MOD SED SAME PHYS/QHP 5/>YRS: CPT | Performed by: INTERNAL MEDICINE

## 2024-11-18 PROCEDURE — 93320 DOPPLER ECHO COMPLETE: CPT | Performed by: INTERNAL MEDICINE

## 2024-11-18 PROCEDURE — 93312 ECHO TRANSESOPHAGEAL: CPT | Performed by: INTERNAL MEDICINE

## 2024-11-18 PROCEDURE — 6370000000 HC RX 637 (ALT 250 FOR IP): Performed by: INTERNAL MEDICINE

## 2024-11-18 PROCEDURE — 1100000003 HC PRIVATE W/ TELEMETRY

## 2024-11-18 PROCEDURE — 2580000003 HC RX 258: Performed by: INTERNAL MEDICINE

## 2024-11-18 PROCEDURE — 6360000002 HC RX W HCPCS: Performed by: INTERNAL MEDICINE

## 2024-11-18 PROCEDURE — 93319 3D ECHO IMG CGEN CAR ANOMAL: CPT | Performed by: INTERNAL MEDICINE

## 2024-11-18 PROCEDURE — 99152 MOD SED SAME PHYS/QHP 5/>YRS: CPT

## 2024-11-18 PROCEDURE — 93312 ECHO TRANSESOPHAGEAL: CPT

## 2024-11-18 PROCEDURE — 99233 SBSQ HOSP IP/OBS HIGH 50: CPT | Performed by: INTERNAL MEDICINE

## 2024-11-18 PROCEDURE — 99232 SBSQ HOSP IP/OBS MODERATE 35: CPT | Performed by: NURSE PRACTITIONER

## 2024-11-18 PROCEDURE — 6370000000 HC RX 637 (ALT 250 FOR IP)

## 2024-11-18 RX ORDER — LIDOCAINE HYDROCHLORIDE 20 MG/ML
SOLUTION OROPHARYNGEAL PRN
Status: COMPLETED | OUTPATIENT
Start: 2024-11-18 | End: 2024-11-18

## 2024-11-18 RX ORDER — MIDAZOLAM HYDROCHLORIDE 1 MG/ML
INJECTION, SOLUTION INTRAMUSCULAR; INTRAVENOUS PRN
Status: COMPLETED | OUTPATIENT
Start: 2024-11-18 | End: 2024-11-18

## 2024-11-18 RX ORDER — FENTANYL CITRATE 50 UG/ML
INJECTION, SOLUTION INTRAMUSCULAR; INTRAVENOUS PRN
Status: COMPLETED | OUTPATIENT
Start: 2024-11-18 | End: 2024-11-18

## 2024-11-18 RX ORDER — PANTOPRAZOLE SODIUM 40 MG/1
40 TABLET, DELAYED RELEASE ORAL
Status: DISCONTINUED | OUTPATIENT
Start: 2024-11-19 | End: 2024-11-22 | Stop reason: HOSPADM

## 2024-11-18 RX ADMIN — AMPICILLIN SODIUM 2000 MG: 2 INJECTION, POWDER, FOR SOLUTION INTRAVENOUS at 16:34

## 2024-11-18 RX ADMIN — MIDAZOLAM 2 MG: 1 INJECTION INTRAMUSCULAR; INTRAVENOUS at 14:06

## 2024-11-18 RX ADMIN — AMPICILLIN SODIUM 2000 MG: 2 INJECTION, POWDER, FOR SOLUTION INTRAVENOUS at 23:36

## 2024-11-18 RX ADMIN — CEFTRIAXONE SODIUM 2000 MG: 2 INJECTION, POWDER, FOR SOLUTION INTRAMUSCULAR; INTRAVENOUS at 05:17

## 2024-11-18 RX ADMIN — FENTANYL CITRATE 25 MCG: 50 INJECTION, SOLUTION INTRAMUSCULAR; INTRAVENOUS at 14:21

## 2024-11-18 RX ADMIN — AMPICILLIN SODIUM 2000 MG: 2 INJECTION, POWDER, FOR SOLUTION INTRAVENOUS at 09:15

## 2024-11-18 RX ADMIN — LIDOCAINE HYDROCHLORIDE 15 ML: 20 SOLUTION ORAL at 13:47

## 2024-11-18 RX ADMIN — MIDAZOLAM 1 MG: 1 INJECTION INTRAMUSCULAR; INTRAVENOUS at 14:08

## 2024-11-18 RX ADMIN — DICLOFENAC SODIUM 4 G: 10 GEL TOPICAL at 11:53

## 2024-11-18 RX ADMIN — AMPICILLIN SODIUM 2000 MG: 2 INJECTION, POWDER, FOR SOLUTION INTRAVENOUS at 00:19

## 2024-11-18 RX ADMIN — SODIUM CHLORIDE, PRESERVATIVE FREE 10 ML: 5 INJECTION INTRAVENOUS at 08:31

## 2024-11-18 RX ADMIN — PANTOPRAZOLE SODIUM 40 MG: 40 INJECTION, POWDER, FOR SOLUTION INTRAVENOUS at 09:09

## 2024-11-18 RX ADMIN — AMPICILLIN SODIUM 2000 MG: 2 INJECTION, POWDER, FOR SOLUTION INTRAVENOUS at 04:41

## 2024-11-18 RX ADMIN — FENTANYL CITRATE 50 MCG: 50 INJECTION, SOLUTION INTRAMUSCULAR; INTRAVENOUS at 14:06

## 2024-11-18 RX ADMIN — LEVETIRACETAM 500 MG: 500 TABLET, FILM COATED ORAL at 08:34

## 2024-11-18 RX ADMIN — ATORVASTATIN CALCIUM 80 MG: 80 TABLET, FILM COATED ORAL at 20:12

## 2024-11-18 RX ADMIN — SODIUM CHLORIDE, PRESERVATIVE FREE 5 ML: 5 INJECTION INTRAVENOUS at 20:12

## 2024-11-18 RX ADMIN — AMPICILLIN SODIUM 2000 MG: 2 INJECTION, POWDER, FOR SOLUTION INTRAVENOUS at 11:52

## 2024-11-18 RX ADMIN — POTASSIUM CHLORIDE 40 MEQ: 1500 TABLET, EXTENDED RELEASE ORAL at 11:56

## 2024-11-18 RX ADMIN — DICLOFENAC SODIUM 4 G: 10 GEL TOPICAL at 20:16

## 2024-11-18 RX ADMIN — CEFTRIAXONE SODIUM 2000 MG: 2 INJECTION, POWDER, FOR SOLUTION INTRAMUSCULAR; INTRAVENOUS at 18:22

## 2024-11-18 RX ADMIN — LEVETIRACETAM 500 MG: 500 TABLET, FILM COATED ORAL at 20:12

## 2024-11-18 RX ADMIN — AMPICILLIN SODIUM 2000 MG: 2 INJECTION, POWDER, FOR SOLUTION INTRAVENOUS at 20:16

## 2024-11-18 RX ADMIN — MIDAZOLAM 2 MG: 1 INJECTION INTRAMUSCULAR; INTRAVENOUS at 14:07

## 2024-11-18 ASSESSMENT — ENCOUNTER SYMPTOMS: NAUSEA: 0

## 2024-11-18 ASSESSMENT — PAIN SCALES - GENERAL: PAINLEVEL_OUTOF10: 0

## 2024-11-18 NOTE — PROGRESS NOTES
Hospitalist Progress Note   Admit Date:  2024 10:03 AM   Name:  Jeronimo Ortiz Sr.   Age:  79 y.o.  Sex:  male  :  1945   MRN:  [id number]   Room:  [facility]/01    Presenting/Chief Complaint: No chief complaint on file.     Reason(s) for Admission: Cerebral hemorrhage (HCC) [I61.9]     Hospital Course:   Jeronimo Ortiz Sr. is a 79 y.o. male with medical history of CVA, lung cancer, HTN, COPD, on Eliquis outpatient for stroke/DVT prophylaxis in setting of lung cancer, who presented to the ER for altered mental status and fatigue. Found to have acute stroke, endocarditis, and enterococcus bacteremia. MRI brain with acute to sub acute infarct in left temporial/parietal lobe, and small petechial hemorrhage. MRI findings concerning for metastasis. Patient transferred to [facility] for further care.       Subjective & 24hr Events:   No overnight events reported. NPO this AM for GYPSY.       Assessment & Plan:     Infective endocarditis  Enterococcus bacteremia  -TTE with small mobile echo-densities  -GYPSY with cardiology pending   -ID consulted. Continue Rocephin and Unasyn    CVA (cerebral vascular accident) (HCC)  Petechial parietal lobe hemorrhage  -MRI brain with acute stroke, small petechial hemorrhage  -concern for metastatic disease versus embolic disease from endocarditis  -neurosurgery consulted, no acute surgical intervention indicated  -on Keppra for prophylaxis  -neurology consulted  -continue ASA daily. Holding anticoagulants in setting of hemorrhagic CNS metastases   -PT/OT/Speech    Acute metabolic encephalopathy  -resolved    HTN  -on lisinopril-HCTZ, norvasc as outpatient  -holding home antihypertensives given acute stroke    HLD  -continue statin    Bronchogenic Lung cancer   -follows with [facility], had lung surgery previously scheduled for biopsy  -follow up outpatient with heme/onc     Mild hyponatremia  -stable at 129  -monitor     JOHN  -on iron tabs outpatient  -

## 2024-11-18 NOTE — PROGRESS NOTES
SPEECH LANGUAGE PATHOLOGY    NAME: Jeronimo Ortiz .  : 1945  MRN: [id number]    ADMISSION DATE: 2024  PRIMARY DIAGNOSIS: CVA (cerebral vascular accident) (HCC)    Speech Therapy consult received and appreciated. Patient is currently NPO for GYPSY. Will follow up at later time. Thank you!     KATHLEEN ANGLIN  2024 11:23 AM

## 2024-11-18 NOTE — PROGRESS NOTES
GYPSY by Dr Galarza  II ASA II Mallampati  5mg versed  75mcg fentanyl  Viscous Solution given at 1347    Pt tolerated well.

## 2024-11-18 NOTE — PROGRESS NOTES
Infectious Disease Folllow up      Today's Date: 2024   Admit Date: 2024  YOB: 1945    Impression:   E. Faecalis Bacteremia with likely AV IE  -  Bcx x2+  - 11/15 Bcx x2 negative   - TTE 11/15: Appears to be small mobile echodensities at the base of the noncoronary cusp/right coronary cusp on the ventricular aspect likely suggestive of vegetations.   -GYPSY 24--pending   Encephalopathy  - MRI noted below but with evidence of mets, infarcts, bleeding, and \"fusiform aneurysm.   Hyponatremia-hovering around 129 or 130  Recent UTI about 3 weeks ago, done at [facility], no results available for review   Anemia   Bronchogenic cancer of right lung, scheduled for lung surgery with PH  COPD     Plan:   Continue high-dose ampicillin 2 g every 4 hours with ceftriaxone 2 g every 12 hours for synergy  Plan for GYPSY today-will follow result.   Repeat BCX 11/15-NGTD   Avoid anticoagulants if possible, given that CNS lesion could be septic emboli  ID will follow along.     Anti-infectives:   Rocephin --  Ampicillin --    Subjective:   Interval Events:   Afebrile. Pt reports feeling unwell. Wants to eat and drink if possible. Discussed repeat BCX negative so far.     He denies acute pain at this time, and his level of alertness has improved per note review.     No Known Allergies     Review of Systems:  All systems reviewed and negative except as otherwise stated in the HPI    Objective:   Blood pressure (!) 122/45, pulse 77, temperature 97.6 °F (36.4 °C), temperature source Oral, resp. rate 12, height 1.753 m (5' 9\"), weight 69.4 kg (153 lb), SpO2 94%.  Visit Vitals  BP (!) 122/45   Pulse 77   Temp 97.6 °F (36.4 °C) (Oral)   Resp 12   Ht 1.753 m (5' 9\")   Wt 69.4 kg (153 lb)   SpO2 94%   BMI 22.59 kg/m²     Temp (24hrs), Av.8 °F (36.6 °C), Min:97.4 °F (36.3 °C), Max:98.3 °F (36.8 °C)       Exam:   General:  No acute distress   Eyes:  Sclera anicteric and no injection or drainage bilaterally

## 2024-11-18 NOTE — PROGRESS NOTES
[facility] CARDIOLOGY PROGRESS NOTE           11/18/2024 10:30 AM    Admit Date: 11/16/2024      Subjective:     No overnight events.  Currently on room air.    ROS:  Cardiovascular:  As noted above    Objective:      Vitals:    11/18/24 0400 11/18/24 0800 11/18/24 1200 11/18/24 1314   BP: (!) 113/40 (!) 122/45 (!) 113/46 (!) 119/49   Pulse: 82 77 73 77   Resp: 17 12 20 16   Temp: 97.4 °F (36.3 °C) 97.6 °F (36.4 °C) 97.4 °F (36.3 °C)    TempSrc: Oral Oral Oral    SpO2: 95% 94% 96% 96%   Weight:       Height:           Physical Exam:  General-No Acute Distress  Neck- supple, no JVD  CV- regular rate and rhythm; low grade ABDIAZIZ at RUSB  Lung- clear bilaterally  Abd- soft, nontender, nondistended  Ext- no edema bilaterally.  Skin- warm and dry    Data Review:   Recent Labs     11/16/24  0229 11/16/24  1243 11/17/24  0412 11/17/24  1355   *  --  129*  --    K 3.6  --  3.1*  --    BUN 6*  --  5*  --    WBC 5.5  --  5.3  --    HGB 8.0*   < > 7.9* 8.4*   HCT 24.2*   < > 23.9* 26.9*     --  230  --     < > = values in this interval not displayed.       Assessment/Plan:     Principal Problem:    CVA (cerebral vascular accident) (HCC)  -Noted prior history of CVA from 7/2024 and evaluated by neurology at the time with concerns for being possibly related to incidental noted malignancy at the time and recommended Eliquis 5 mg twice daily.  -Current presentation with MRI suggestive of acute versus early subacute infarction of the left temporal lobe/left parietal lobe; also oval enhancing focus of the anterior medial left occipital cortex with concern for malignancy.  Also left parietal lobe hemorrhage with concern for hemorrhagic metastases.  -Improved mentation.  Hold off anticoagulation with noted mass and also cannot rule out possibility of septic emboli with subsequent hemorrhage    Aortic valve vegetation  -In the setting of Enterococcus faecalis bacteremia; also consideration for marantic endocarditis

## 2024-11-18 NOTE — PLAN OF CARE
Problem: Chronic Conditions and Co-morbidities  Goal: Patient's chronic conditions and co-morbidity symptoms are monitored and maintained or improved  Outcome: Progressing  Flowsheets (Taken 11/17/2024 2000)  Care Plan - Patient's Chronic Conditions and Co-Morbidity Symptoms are Monitored and Maintained or Improved:   Monitor and assess patient's chronic conditions and comorbid symptoms for stability, deterioration, or improvement   Collaborate with multidisciplinary team to address chronic and comorbid conditions and prevent exacerbation or deterioration     Problem: Discharge Planning  Goal: Discharge to home or other facility with appropriate resources  Outcome: Progressing  Flowsheets (Taken 11/17/2024 2000)  Discharge to home or other facility with appropriate resources:   Identify barriers to discharge with patient and caregiver   Arrange for needed discharge resources and transportation as appropriate     Problem: Safety - Adult  Goal: Free from fall injury  Outcome: Progressing  Flowsheets (Taken 11/18/2024 0000)  Free From Fall Injury: Instruct family/caregiver on patient safety     Problem: Skin/Tissue Integrity  Goal: Absence of new skin breakdown  Description: 1.  Monitor for areas of redness and/or skin breakdown  2.  Assess vascular access sites hourly  3.  Every 4-6 hours minimum:  Change oxygen saturation probe site  4.  Every 4-6 hours:  If on nasal continuous positive airway pressure, respiratory therapy assess nares and determine need for appliance change or resting period.  Outcome: Progressing

## 2024-11-18 NOTE — PROGRESS NOTES
TRANSFER - OUT REPORT:    Verbal report given to SHERMAN Beth on Jeronimo Moore Angel Sr.  being transferred to [facility] for routine progression of patient care       Report consisted of patient’s Situation, Background, Assessment and Recommendations(SBAR).     Information from the following report(s) Procedure Summary was reviewed with the receiving nurse.    Opportunity for questions and clarification was provided.

## 2024-11-18 NOTE — ICUWATCH
RRT Clinical Rounding Nurse Update    Vitals:    11/17/24 2000 11/18/24 0000 11/18/24 0400 11/18/24 0800   BP: (!) 110/38 (!) 126/47 (!) 113/40 (!) 122/45   Pulse: 90 80 82 77   Resp: 17 15 17 12   Temp: 98 °F (36.7 °C) 97.6 °F (36.4 °C) 97.4 °F (36.3 °C) 97.6 °F (36.4 °C)   TempSrc: Oral Oral Oral Oral   SpO2: 95% 93% 95% 94%   Weight:       Height:         ASSESSMENT:  Previous outreach assessment was reviewed. There have been no significant changes since previous assessment. Patient alert, family at beside, plans for GYPSY today, unknown what time but has been NPO. Family at bedside. On room air, lung sounds clear. Afebrile. Hgb stable from 7.9 to 8.4. No labs this AM, so unsure if his potassium level is ok from yesterday. Will follow results.    PLAN:  Will follow per RRT Clinical Rounding Program protocol.    Atif Selby RN  [facility]: [phone]  [facility]: [phone]   
non-distended/non-tender

## 2024-11-18 NOTE — PROGRESS NOTES
TRANSFER - IN REPORT:    Verbal report received from SHERMAN Tsai on Jeronimo Ortiz Sr.  being received from Cath lab Recovery for ordered procedure      Report consisted of patient's Situation, Background, Assessment and   Recommendations(SBAR).     Information from the following report(s) Nurse Handoff Report was reviewed with the receiving nurse.    Opportunity for questions and clarification was provided.      Assessment completed upon patient's arrival to unit and care assumed.

## 2024-11-18 NOTE — PROGRESS NOTES
Occupational Therapy Note:    Attempted to see patient this AM and PM for occupational therapy evaluation session. Patient was getting IV placed this AM and MARY ANNE for GYPSY this PM. Will follow and re-attempt as schedule permits/patient available. Thank you,    JAIME Jefferson, OT    Rehab Caseload Tracker

## 2024-11-18 NOTE — PROGRESS NOTES
Neurology Daily Progress Note     Assessment:     79 year old male with acute ischemic stroke, left parietal region, with hemorrhagic transformation. He has metastatic cancer with brain metastasis. He is currently being treated bacteremia and being worked up for possible bacterial endocarditis. Plan for GYPSY today. Septic emboli is a possible etiology for stroke.     Plan:     Continue aspirin and statin  Eliquis has been discontinued  Neurochecks Q4H  Telemetry  Plan for GYPSY today  PT/OT/ST  BP management - normotensive, with long-term goal <140/90  Smoking cessation if applicable   Diabetes education if applicable   Depression Screening prior to discharge   Follow up with neurology after d/c     Subjective:        Interval history:    Patient doing well. Plan for GYPSY today.     History:    Jeronimo Ortiz Sr. is a 79 y.o. male who presents on referral from the hospitalist service.  79-year-old male recently diagnosed with lung cancer with past medical history significant for hypertension prior stroke into the hospital with evidence of a UTI with positive blood cultures subsequently developed confusion and aphasia which prompted a primary neurologic workup demonstrating the presence of cerebral infarction with hemorrhagic transformation in addition to concern with reference to cerebral metastatic disease .    Review of systems negative with exception of pertinent positives and negatives noted above.       Objective:     Physical Exam:  General - Well developed, well nourished, in no apparent distress. Pleasant and conversant.   HEENT - Normocephalic, atraumatic. Conjunctiva are clear.   Neck - Supple without masses  Extremities - Peripheral pulses intact. No edema and no rashes.     Neurological examination - Comprehension, attention, memory and reasoning are intact. Language and speech are normal.   On cranial nerve examination, (II, III, IV, VI) pupils are equal, round, and reactive to light. Extraocular

## 2024-11-19 PROBLEM — I35.1 SEVERE AORTIC REGURGITATION: Status: ACTIVE | Noted: 2024-11-19

## 2024-11-19 LAB
ANION GAP SERPL CALC-SCNC: 9 MMOL/L (ref 7–16)
BUN SERPL-MCNC: 20 MG/DL (ref 8–23)
CALCIUM SERPL-MCNC: 7.6 MG/DL (ref 8.8–10.2)
CHLORIDE SERPL-SCNC: 104 MMOL/L (ref 98–107)
CO2 SERPL-SCNC: 20 MMOL/L (ref 20–29)
CREAT SERPL-MCNC: 0.47 MG/DL (ref 0.8–1.3)
GLUCOSE SERPL-MCNC: 114 MG/DL (ref 70–99)
POTASSIUM SERPL-SCNC: 3.2 MMOL/L (ref 3.5–5.1)
SODIUM SERPL-SCNC: 133 MMOL/L (ref 136–145)

## 2024-11-19 PROCEDURE — 6370000000 HC RX 637 (ALT 250 FOR IP): Performed by: INTERNAL MEDICINE

## 2024-11-19 PROCEDURE — 36415 COLL VENOUS BLD VENIPUNCTURE: CPT

## 2024-11-19 PROCEDURE — 99232 SBSQ HOSP IP/OBS MODERATE 35: CPT | Performed by: NURSE PRACTITIONER

## 2024-11-19 PROCEDURE — 80048 BASIC METABOLIC PNL TOTAL CA: CPT

## 2024-11-19 PROCEDURE — 97535 SELF CARE MNGMENT TRAINING: CPT

## 2024-11-19 PROCEDURE — 1100000003 HC PRIVATE W/ TELEMETRY

## 2024-11-19 PROCEDURE — 6360000002 HC RX W HCPCS: Performed by: INTERNAL MEDICINE

## 2024-11-19 PROCEDURE — 6370000000 HC RX 637 (ALT 250 FOR IP)

## 2024-11-19 PROCEDURE — 97165 OT EVAL LOW COMPLEX 30 MIN: CPT

## 2024-11-19 PROCEDURE — 99233 SBSQ HOSP IP/OBS HIGH 50: CPT | Performed by: INTERNAL MEDICINE

## 2024-11-19 PROCEDURE — 92610 EVALUATE SWALLOWING FUNCTION: CPT

## 2024-11-19 PROCEDURE — 97530 THERAPEUTIC ACTIVITIES: CPT

## 2024-11-19 PROCEDURE — 2580000003 HC RX 258: Performed by: INTERNAL MEDICINE

## 2024-11-19 RX ORDER — POTASSIUM CHLORIDE 1500 MG/1
40 TABLET, EXTENDED RELEASE ORAL ONCE
Status: COMPLETED | OUTPATIENT
Start: 2024-11-19 | End: 2024-11-19

## 2024-11-19 RX ADMIN — SODIUM CHLORIDE, PRESERVATIVE FREE 10 ML: 5 INJECTION INTRAVENOUS at 08:28

## 2024-11-19 RX ADMIN — LEVETIRACETAM 500 MG: 500 TABLET, FILM COATED ORAL at 20:21

## 2024-11-19 RX ADMIN — AMPICILLIN SODIUM 2000 MG: 2 INJECTION, POWDER, FOR SOLUTION INTRAVENOUS at 03:51

## 2024-11-19 RX ADMIN — AMPICILLIN SODIUM 2000 MG: 2 INJECTION, POWDER, FOR SOLUTION INTRAVENOUS at 08:26

## 2024-11-19 RX ADMIN — DICLOFENAC SODIUM 4 G: 10 GEL TOPICAL at 08:28

## 2024-11-19 RX ADMIN — LEVETIRACETAM 500 MG: 500 TABLET, FILM COATED ORAL at 08:27

## 2024-11-19 RX ADMIN — POTASSIUM CHLORIDE 40 MEQ: 1500 TABLET, EXTENDED RELEASE ORAL at 16:30

## 2024-11-19 RX ADMIN — DICLOFENAC SODIUM 4 G: 10 GEL TOPICAL at 20:21

## 2024-11-19 RX ADMIN — SODIUM CHLORIDE, PRESERVATIVE FREE 5 ML: 5 INJECTION INTRAVENOUS at 20:25

## 2024-11-19 RX ADMIN — AMPICILLIN SODIUM 2000 MG: 2 INJECTION, POWDER, FOR SOLUTION INTRAVENOUS at 12:30

## 2024-11-19 RX ADMIN — CEFTRIAXONE SODIUM 2000 MG: 2 INJECTION, POWDER, FOR SOLUTION INTRAMUSCULAR; INTRAVENOUS at 18:06

## 2024-11-19 RX ADMIN — Medication 1 CAPSULE: at 08:27

## 2024-11-19 RX ADMIN — AMPICILLIN SODIUM 2000 MG: 2 INJECTION, POWDER, FOR SOLUTION INTRAVENOUS at 16:40

## 2024-11-19 RX ADMIN — Medication 15 G: at 08:27

## 2024-11-19 RX ADMIN — CEFTRIAXONE SODIUM 2000 MG: 2 INJECTION, POWDER, FOR SOLUTION INTRAMUSCULAR; INTRAVENOUS at 05:55

## 2024-11-19 RX ADMIN — ATORVASTATIN CALCIUM 80 MG: 80 TABLET, FILM COATED ORAL at 20:21

## 2024-11-19 RX ADMIN — ASPIRIN 81 MG: 81 TABLET, CHEWABLE ORAL at 08:27

## 2024-11-19 RX ADMIN — PANTOPRAZOLE SODIUM 40 MG: 40 TABLET, DELAYED RELEASE ORAL at 05:52

## 2024-11-19 RX ADMIN — AMPICILLIN SODIUM 2000 MG: 2 INJECTION, POWDER, FOR SOLUTION INTRAVENOUS at 20:27

## 2024-11-19 ASSESSMENT — PAIN SCALES - GENERAL
PAINLEVEL_OUTOF10: 0
PAINLEVEL_OUTOF10: 1

## 2024-11-19 NOTE — PROGRESS NOTES
[facility] CARDIOLOGY PROGRESS NOTE           11/19/2024 10:40 AM    Admit Date: 11/16/2024      Subjective:     No overnight events.  Currently on room air.  Extensively discussed GYPSY from yesterday.  Also discussed with ID/CT surgery.    ROS:  Cardiovascular:  As noted above    Objective:      Vitals:    11/19/24 0121 11/19/24 0519 11/19/24 0800 11/19/24 1200   BP: (!) 117/47 (!) 128/40 (!) 121/39 (!) 127/42   Pulse: 72 74 73    Resp: 16 16 16 16   Temp: 98.1 °F (36.7 °C) 97.7 °F (36.5 °C) 97.8 °F (36.6 °C) 97.7 °F (36.5 °C)   TempSrc: Axillary Oral Oral Oral   SpO2: 92% 93% 94% 96%   Weight:       Height:           Physical Exam:  General-No Acute Distress  Neck- supple, no JVD  CV- regular rate and rhythm; low grade ABDIAZIZ at RUSB  Lung- clear bilaterally  Abd- soft, nontender, nondistended  Ext- no edema bilaterally.  Skin- warm and dry    Data Review:   Recent Labs     11/17/24  0412 11/17/24  1355 11/19/24  1248   *  --  133*   K 3.1*  --  3.2*   BUN 5*  --  20   WBC 5.3  --   --    HGB 7.9* 8.4*  --    HCT 23.9* 26.9*  --      --   --        Assessment/Plan:     Principal Problem:    CVA (cerebral vascular accident) (HCC)  -Noted prior history of CVA from 7/2024 and evaluated by neurology at the time with concerns for being possibly related to incidental noted malignancy at the time and recommended Eliquis 5 mg twice daily.  -Current presentation with MRI suggestive of acute versus early subacute infarction of the left temporal lobe/left parietal lobe; also oval enhancing focus of the anterior medial left occipital cortex with concern for malignancy.  Also left parietal lobe hemorrhage with concern for hemorrhagic metastases.  -Improved mentation.  Hold off anticoagulation with noted mass and also cannot rule out possibility of septic emboli with subsequent hemorrhage; avoid restarting    Aortic valve vegetation  -In the setting of Enterococcus faecalis bacteremia; GYPSY with suggestion of  possible leaflet perforation/prolapsing noncoronary cusp with severe aortic regurgitation.  -Ideally consideration for surgical intervention but appears poor candidate in the setting of probable metastatic lung cancer/noted recurrent CVA/parietal lobe hemorrhage.  Possible prior septic cerebral emboli as stated above but noted prior to antibiotics.  Continue antibiotics currently.  If recurrent embolic events, ideally surgical consideration but poor surgical candidate as stated.  Irrespective, will have CT surgery evaluate patient but discussed with family needs palliative care evaluation.  Poor long-term prognosis.    Severe aortic regurgitation  -See above.  Suspect leaflet perforation/prolapsing noncoronary cusp; conservative therapy as stated.  Can repeat limited echo in the future once 6 weeks of antibiotics completed and reassess severity.      Bacteremia  -See above.      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  Management per ID.  Discussed with ID.      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 from pulmonology/radiation oncology/surgery from 9/2024 & 10/2024;       Severe anemia  -Management per primary team.    Spent over 50 minutes with patient care with over 50% spent directly with patient (also 3 daughters present today) discussing above.  Extensive review of records including review of prior CVA presentation from 7/2024 and subsequent workup for lung malignancy at [facility] from 9/2024.  Discussed case with CT surgery/ID    Bryce Galarza MD  11/19/2024 10:40 AM

## 2024-11-19 NOTE — PROGRESS NOTES
Hospitalist Progress Note   Admit Date:  2024 10:03 AM   Name:  Jeronimo Ortiz Sr.   Age:  79 y.o.  Sex:  male  :  1945   MRN:  [id number]   Room:  [facility]/01    Presenting/Chief Complaint: No chief complaint on file.     Reason(s) for Admission: Cerebral hemorrhage (HCC) [I61.9]     Hospital Course:   Jeronimo Ortiz Sr. is a 79 y.o. male with medical history of CVA, lung cancer, hypertension, COPD, on Eliquis secondary to stroke and DVT prophylaxis in setting of cancer.  Patient initially presented secondary to altered mental status and found to have acute stroke, endocarditis and Enterococcus bacteremia.  MRI brain showing acute to subacute infarct in left temporal/parietal lobe and small petechial hemorrhage.  MRI also concerning for metastases.  Patient underwent GYPSY showing vegetation.  At this time plan for long-term antibiotics and working on getting this set up outpatient      Subjective & 24hr Events:   Patient resting comfortably on examination.  Patient had no overnight events or complaints on exam this morning.      Assessment & Plan:     Infective endocarditis  Enterococcus bacteremia  TTE showing small mobile echodensity  GYPSY again showing small mobile echodensity  ID consulted  Continue Rocephin and Unasyn rrhage of left cerebral hemisphere (HCC)    CVA  Petechial parietal lobe hemorrhage  MRI brain showing acute stroke and small petechial hemorrhage  Concern for metastatic disease versus embolic disease from endocarditis  Neurosurgery evaluated no acute interventions at this time  Keppra for prophylaxis  Neurology following  Aspirin daily.  Holding anticoagulation in setting of hemorrhagic CNS metastases  PT and OT to evaluate and speech    Hypertension  Blood pressure adequately controlled off home blood pressure medication  Continue to monitor    Hyperlipidemia  Statin    Bronchogenic lung cancer  Follows with [facility] as outpatient  Outpatient follow-up with hematology  Status: He is alert. Mental status is at baseline.           I have personally reviewed labs and tests:  Recent Labs:  Recent Results (from the past 48 hour(s))   Hemoglobin and Hematocrit    Collection Time: 11/17/24  1:55 PM   Result Value Ref Range    Hemoglobin 8.4 (L) 13.6 - 17.2 g/dL    Hematocrit 26.9 (L) 41.1 - 50.3 %   GYPSY (PRN contrast/bubble/3D)    Collection Time: 11/18/24  2:26 PM   Result Value Ref Range    Body Surface Area 1.84 m2    AR .0 ms    AR Max Velocity PISA 3.5 m/s       No results for input(s): \"COVID19\" in the last 72 hours.    Current Meds:  Current Facility-Administered Medications   Medication Dose Route Frequency    diclofenac sodium (VOLTAREN) 1 % gel 4 g  4 g Topical BID    pantoprazole (PROTONIX) tablet 40 mg  40 mg Oral QAM AC    aspirin chewable tablet 81 mg  81 mg Oral Daily    urea (URE-NA) packet 15 g  15 g Oral Daily    ampicillin (OMNIPEN) 2,000 mg in sodium chloride 0.9 % 100 mL IVPB (mini-bag)  2,000 mg IntraVENous Q4H    atorvastatin (LIPITOR) tablet 80 mg  80 mg Oral Daily    cefTRIAXone (ROCEPHIN) 2,000 mg in sodium chloride 0.9 % 50 mL IVPB (mini-bag)  2,000 mg IntraVENous Q12H    lactobacillus (CULTURELLE) capsule 1 capsule  1 capsule Oral Daily with breakfast    acetaminophen (TYLENOL) tablet 650 mg  650 mg Oral Q6H PRN    Or    acetaminophen (TYLENOL) suppository 650 mg  650 mg Rectal Q6H PRN    labetalol (NORMODYNE;TRANDATE) injection 5 mg  5 mg IntraVENous Q4H PRN    magnesium sulfate 2000 mg in 50 mL IVPB premix  2,000 mg IntraVENous PRN    ondansetron (ZOFRAN-ODT) disintegrating tablet 4 mg  4 mg Oral Q8H PRN    Or    ondansetron (ZOFRAN) injection 4 mg  4 mg IntraVENous Q6H PRN    polyethylene glycol (GLYCOLAX) packet 17 g  17 g Oral Daily PRN    potassium chloride (KLOR-CON M) extended release tablet 40 mEq  40 mEq Oral PRN    Or    potassium bicarb-citric acid (EFFER-K) effervescent tablet 40 mEq  40 mEq Oral PRN    Or    potassium chloride 10 mEq/100

## 2024-11-19 NOTE — PLAN OF CARE
CT Surgery has been consulted due to concern for aortic valve endocarditis with septic emboli and valve destruction.     Patient is not a surgical candidate due to hemorrhagic stroke and metastatic lung cancer.     I have discussed this patient with Dr. Galarza with cardiology and Barbara Chicas with our CT Surgery team.     Please message with questions or concerns.

## 2024-11-19 NOTE — PROGRESS NOTES
GOALS:  LTG: Patient will maintain adequate hydration/nutrition with optimum safety and efficiency of swallowing function with PO intake without overt signs or symptoms of aspiration for the highest appropriate diet level.  STG:  Patient will consume easy to chew textures and thin liquids without overt signs or symptoms of airway compromise.  Patient will safely ingest diet trials during therapeutic feedings with SLP without overt signs or symptoms of respiratory compromise in efforts to advance diet.  Patient will complete a Modified Barium Swallow study to fully assess physiology and anatomy of the swallow and determine safest appropriate diet and/or rehabilitation strategies, as medically indicated.    SPEECH LANGUAGE PATHOLOGY: DYSPHAGIA Initial Assessment    Acknowledge Order  I  Therapy Time  I   Charges     I  Rehab Caseload Tracker      NAME: Jeronimo Ortiz .  : 1945  MRN: [id number]    ADMISSION DATE: 2024  PRIMARY DIAGNOSIS: CVA (cerebral vascular accident) ([facility])    ICD-10: Treatment Diagnosis: R13.12 Dysphagia, Oropharyngeal Phase    RECOMMENDATIONS   Diet:    Easy to Chew- allow breads/mixed consistencies. Allow family to bring in outside food of regular texture.   Thin Liquids    Medication: as tolerated   Compensatory Swallowing Strategies:   Slow rate of intake  Small bites/sips  Upright as possible for all oral intake  Ensure dentures are in prior to PO   Therapeutic Intervention:   Patient/family education  Dysphagia treatment   Patient continues to require skilled intervention:  Yes. Recommend ongoing speech therapy services during this hospitalization.     Anticipated Discharge Needs: Do not anticipate ongoing speech therapy needs upon discharge.      ASSESSMENT    Patient presents with mild oral dysphagia secondary to ill fitting dentures. Mildly prolonged mastication but generally adequate oral phase. Occasional cough noted at baseline though none appeared to be directly

## 2024-11-19 NOTE — THERAPY EVALUATION
[] [] [x] []    Transfers    Sit to Stand [] [] [] [] [x] [] [] [] [] [] []    Bed to Chair [] [] [] [] [x] [] [] [] [] [] []    Stand to Sit [] [] [] [] [x] [] [] [] [] [] []    Tub/Shower [] [] [] [] [] [] [] [] [] [x] []     Toilet [] [] [] [] [] [] [] [] [] [x] []      [] [] [] [] [] [] [] [] [] [] []    I=Independent, Mod I=Modified Independent, S=Supervision/Setup, SBA=Standby Assistance, CGA=Contact Guard Assistance, Min=Minimal Assistance, Mod=Moderate Assistance, Max=Maximal Assistance, Total=Total Assistance, NT=Not Tested    ACTIVITIES OF DAILY LIVING: I Mod I S SBA CGA Min Mod Max Total NT Comments   BASIC ADLs:              Upper Body Bathing  [] [] [] [] [] [] [] [] [] [x]     Lower Body Bathing [] [] [] [] [] [] [] [] [] [x]     Toileting [] [] [] [] [] [] [] [] [] [x]    Upper Body Dressing [] [] [] [] [] [] [] [] [] [x]    Lower Body Dressing [] [] [] [x] [x] [] [] [] [] [] Sitting, doff/don socks   Feeding [] [] [] [] [] [] [] [] [] [x]    Grooming [] [] [] [] [x] [] [] [] [] [] Standing edge of sink, completed oral hygiene, face washing, and hair combing  Assist/cues for dynamic balance, sustained attention, and task sequencing   Personal Device Care [] [] [] [] [] [] [] [] [] [x]    Functional Mobility [] [] [] [] [x] [] [] [] [] [] No AD   I=Independent, Mod I=Modified Independent, S=Supervision/Setup, SBA=Standby Assistance, CGA=Contact Guard Assistance, Min=Minimal Assistance, Mod=Moderate Assistance, Max=Maximal Assistance, Total=Total Assistance, NT=Not Tested    PLAN:   FREQUENCY/DURATION   OT Plan of Care: 3 times/week for duration of hospital stay or until stated goals are met, whichever comes first.    PROBLEM LIST:   (Skilled intervention is medically necessary to address:)  Decreased ADL/Functional Activities  Decreased Activity Tolerance  Decreased Balance  Decreased Cognition  Decreased Coordination  Decreased Safety Awareness  Decreased Strength  Decreased Transfer Abilities    INTERVENTIONS PLANNED:  (Benefits and precautions of occupational therapy have been discussed with the patient.)  Self Care Training  Therapeutic Activity  Therapeutic Exercise/HEP  Neuromuscular Re-education  Manual Therapy  Education         TREATMENT:     EVALUATION: LOW COMPLEXITY: (Untimed Charge)  The initial evaluation charge encompasses clinical chart review, objective assessment, interpretation of assessment, and skilled monitoring of the patient's response to treatment in order to develop a plan of care.     TREATMENT:   Therapeutic Activity (10 Minutes): Patient participated in therapeutic activities including bed mobility training, functional transfer training, functional mobility of household distances, sitting tolerance activity, and standing tolerance activity with minimal assistance, verbal cues, tactile cues, visual cues, and education in order to increase independence, increase safety awareness, increase activity tolerance, increase coordination, decrease assistance required, prepare for functional activity, and prepare for functional transfers.   Self Care (16 minutes): Patient participated in lower body dressing and grooming ADLs in unsupported sitting and standing with minimal visual, verbal, and tactile cueing to increase independence, decrease assistance required, increase activity tolerance, and increase safety awareness. Patient also participated in bed mobility, functional mobility, and functional transfer training to increase independence, decrease assistance required, increase activity tolerance, and increase safety awareness. The patient was educated on role of occupational therapy, strategies to improve safety, and transfer training and safety and patient verbalized understanding and will need reinforcement at next session.     TREATMENT GRID:  N/A    AFTER TREATMENT PRECAUTIONS: Alarm Activated, Bed/Chair Locked, Call light within reach, Chair, Needs within reach, RN notified, and

## 2024-11-19 NOTE — PROGRESS NOTES
Neurology Daily Progress Note     Assessment:     79 year old male with acute ischemic stroke, left parietal region, with hemorrhagic transformation. Etiology is most likely septic emboli.     Plan:     Continue aspirin and statin  Eliquis has been discontinued  Neurochecks Q4H  Telemetry  PT/OT/ST  BP management - normotensive, with long-term goal <140/90  Smoking cessation if applicable   Diabetes education if applicable   Depression Screening prior to discharge   Follow up with neurology after d/c   Recommend repeating brain MRI in 3-4 weeks. Metastasis can be difficult to assess in the setting of septic emboli and this can be better evaluated on repeat imaging to confirm diagnosis   We will sign off     Subjective:        Interval history:    Patient doing well. Reports stroke symptoms are resolved. GYPSY positive for vegetation.     History:    Jeronimo Ortiz  is a 79 y.o. male who presents on referral from the hospitalist service.  79-year-old male recently diagnosed with lung cancer with past medical history significant for hypertension prior stroke into the hospital with evidence of a UTI with positive blood cultures subsequently developed confusion and aphasia which prompted a primary neurologic workup demonstrating the presence of cerebral infarction with hemorrhagic transformation in addition to concern with reference to cerebral metastatic disease .    Review of systems negative with exception of pertinent positives and negatives noted above.       Objective:     Physical Exam:  General - Well developed, well nourished, in no apparent distress. Pleasant and conversant.   HEENT - Normocephalic, atraumatic. Conjunctiva are clear.   Neck - Supple without masses  Extremities - Peripheral pulses intact. No edema and no rashes.     Neurological examination - Comprehension, attention, memory and reasoning are intact. Language and speech are normal.   On cranial nerve examination, (II, III, IV, VI)

## 2024-11-19 NOTE — PROGRESS NOTES
Infectious Disease Follow up      Today's Date: 2024   Admit Date: 2024  YOB: 1945    Impression:   E. Faecalis AV IE  -  Bcx x2+  - 11/15 Bcx x2 negative   - TTE 11/15: Appears to be small mobile echodensities at the base of the noncoronary cusp/right coronary cusp on the ventricular aspect likely suggestive of vegetations.   -GYPSY 24-- small to medium mobile echodensities with severe regurgitation  Encephalopathy  - MRI noted below but with evidence of mets, infarcts, bleeding, and \"fusiform aneurysm.   Hyponatremia-hovering around 129 or 130  Recent UTI about 3 weeks ago, done at [facility], no results available for review   Anemia   Bronchogenic cancer of right lung, scheduled for lung surgery with PH  COPD     Plan:   Continue high-dose ampicillin 2 g every 4 hours with ceftriaxone 2 g every 12 hours for synergy   Will need extended course of antibiotic therapy.  Discussed case with Cardiology and agree with Palliative Care consult.  ID will follow along.     Anti-infectives:   Rocephin --  Ampicillin --    Subjective:   Interval Events:   Patient seen resting in bed. Family at bedside. NO changes to ID plan.       No Known Allergies     Review of Systems:  All systems reviewed and negative except as otherwise stated in the HPI    Objective:   Blood pressure (!) 121/39, pulse 73, temperature 97.8 °F (36.6 °C), temperature source Oral, resp. rate 16, height 1.753 m (5' 9\"), weight 69.4 kg (153 lb), SpO2 94%.  Visit Vitals  BP (!) 121/39   Pulse 73   Temp 97.8 °F (36.6 °C) (Oral)   Resp 16   Ht 1.753 m (5' 9\")   Wt 69.4 kg (153 lb)   SpO2 94%   BMI 22.59 kg/m²     Temp (24hrs), Av.7 °F (36.5 °C), Min:97.3 °F (36.3 °C), Max:98.1 °F (36.7 °C)     Patient was seen and examined on 2024 and physical exam remains unchanged since yesterday, unless noted below:    Exam:   General:  No acute distress   Eyes:  Sclera anicteric and no injection or drainage bilaterally

## 2024-11-19 NOTE — CARE COORDINATION
CM spoke with patient's covering [facility]  Regulo Abdalla at [phone].  Update provided on patient's anticipated need for long-term OPAT and home health RN and OT.  Once all final recommendations and  documentation is completed, CM can then fax all information to the [facility] primary care team who will arrange the OPAT and HH with patient's preferred agencies.  Their fax is [phone].

## 2024-11-20 PROBLEM — R63.0 ANOREXIA: Status: ACTIVE | Noted: 2024-11-20

## 2024-11-20 PROBLEM — Z51.5 ENCOUNTER FOR PALLIATIVE CARE: Status: ACTIVE | Noted: 2024-11-20

## 2024-11-20 PROBLEM — Z71.89 ACP (ADVANCE CARE PLANNING): Status: ACTIVE | Noted: 2024-11-20

## 2024-11-20 LAB
ANION GAP SERPL CALC-SCNC: 7 MMOL/L (ref 7–16)
BACTERIA SPEC CULT: NORMAL
BACTERIA SPEC CULT: NORMAL
BASOPHILS # BLD: 0 K/UL (ref 0–0.2)
BASOPHILS NFR BLD: 1 % (ref 0–2)
BUN SERPL-MCNC: 9 MG/DL (ref 8–23)
CALCIUM SERPL-MCNC: 7.7 MG/DL (ref 8.8–10.2)
CHLORIDE SERPL-SCNC: 106 MMOL/L (ref 98–107)
CO2 SERPL-SCNC: 21 MMOL/L (ref 20–29)
CREAT SERPL-MCNC: 0.46 MG/DL (ref 0.8–1.3)
DIFFERENTIAL METHOD BLD: ABNORMAL
EOSINOPHIL # BLD: 0.1 K/UL (ref 0–0.8)
EOSINOPHIL NFR BLD: 2 % (ref 0.5–7.8)
ERYTHROCYTE [DISTWIDTH] IN BLOOD BY AUTOMATED COUNT: 18.6 % (ref 11.9–14.6)
GLUCOSE SERPL-MCNC: 89 MG/DL (ref 70–99)
HCT VFR BLD AUTO: 25.4 % (ref 41.1–50.3)
HGB BLD-MCNC: 8.2 G/DL (ref 13.6–17.2)
IMM GRANULOCYTES # BLD AUTO: 0 K/UL (ref 0–0.5)
IMM GRANULOCYTES NFR BLD AUTO: 1 % (ref 0–5)
LYMPHOCYTES # BLD: 1.1 K/UL (ref 0.5–4.6)
LYMPHOCYTES NFR BLD: 18 % (ref 13–44)
MCH RBC QN AUTO: 26.5 PG (ref 26.1–32.9)
MCHC RBC AUTO-ENTMCNC: 32.3 G/DL (ref 31.4–35)
MCV RBC AUTO: 81.9 FL (ref 82–102)
MONOCYTES # BLD: 0.3 K/UL (ref 0.1–1.3)
MONOCYTES NFR BLD: 6 % (ref 4–12)
NEUTS SEG # BLD: 4.3 K/UL (ref 1.7–8.2)
NEUTS SEG NFR BLD: 72 % (ref 43–78)
NRBC # BLD: 0 K/UL (ref 0–0.2)
PLATELET # BLD AUTO: 283 K/UL (ref 150–450)
PMV BLD AUTO: 8 FL (ref 9.4–12.3)
POTASSIUM SERPL-SCNC: 3.4 MMOL/L (ref 3.5–5.1)
RBC # BLD AUTO: 3.1 M/UL (ref 4.23–5.6)
SERVICE CMNT-IMP: NORMAL
SERVICE CMNT-IMP: NORMAL
SODIUM SERPL-SCNC: 135 MMOL/L (ref 136–145)
WBC # BLD AUTO: 5.9 K/UL (ref 4.3–11.1)

## 2024-11-20 PROCEDURE — 92526 ORAL FUNCTION THERAPY: CPT

## 2024-11-20 PROCEDURE — 2580000003 HC RX 258: Performed by: NURSE PRACTITIONER

## 2024-11-20 PROCEDURE — 2400000000

## 2024-11-20 PROCEDURE — 85025 COMPLETE CBC W/AUTO DIFF WBC: CPT

## 2024-11-20 PROCEDURE — 80048 BASIC METABOLIC PNL TOTAL CA: CPT

## 2024-11-20 PROCEDURE — 36415 COLL VENOUS BLD VENIPUNCTURE: CPT

## 2024-11-20 PROCEDURE — 2580000003 HC RX 258: Performed by: INTERNAL MEDICINE

## 2024-11-20 PROCEDURE — 6370000000 HC RX 637 (ALT 250 FOR IP)

## 2024-11-20 PROCEDURE — 99232 SBSQ HOSP IP/OBS MODERATE 35: CPT | Performed by: INTERNAL MEDICINE

## 2024-11-20 PROCEDURE — 1100000003 HC PRIVATE W/ TELEMETRY

## 2024-11-20 PROCEDURE — 6360000002 HC RX W HCPCS: Performed by: INTERNAL MEDICINE

## 2024-11-20 PROCEDURE — 76937 US GUIDE VASCULAR ACCESS: CPT

## 2024-11-20 PROCEDURE — 99222 1ST HOSP IP/OBS MODERATE 55: CPT | Performed by: NURSE PRACTITIONER

## 2024-11-20 PROCEDURE — 6370000000 HC RX 637 (ALT 250 FOR IP): Performed by: INTERNAL MEDICINE

## 2024-11-20 PROCEDURE — 99497 ADVNCD CARE PLAN 30 MIN: CPT | Performed by: NURSE PRACTITIONER

## 2024-11-20 PROCEDURE — 6360000002 HC RX W HCPCS: Performed by: NURSE PRACTITIONER

## 2024-11-20 RX ORDER — DIPHENHYDRAMINE HCL 25 MG
25 CAPSULE ORAL NIGHTLY PRN
Status: DISCONTINUED | OUTPATIENT
Start: 2024-11-20 | End: 2024-11-22 | Stop reason: HOSPADM

## 2024-11-20 RX ADMIN — ACETAMINOPHEN 650 MG: 325 TABLET ORAL at 15:51

## 2024-11-20 RX ADMIN — SODIUM CHLORIDE 20 MILLION UNITS: 9 INJECTION, SOLUTION INTRAVENOUS at 15:51

## 2024-11-20 RX ADMIN — LEVETIRACETAM 500 MG: 500 TABLET, FILM COATED ORAL at 08:27

## 2024-11-20 RX ADMIN — LEVETIRACETAM 500 MG: 500 TABLET, FILM COATED ORAL at 21:12

## 2024-11-20 RX ADMIN — CEFTRIAXONE SODIUM 2000 MG: 2 INJECTION, POWDER, FOR SOLUTION INTRAMUSCULAR; INTRAVENOUS at 18:28

## 2024-11-20 RX ADMIN — AMPICILLIN SODIUM 2000 MG: 2 INJECTION, POWDER, FOR SOLUTION INTRAVENOUS at 04:28

## 2024-11-20 RX ADMIN — CEFTRIAXONE SODIUM 2000 MG: 2 INJECTION, POWDER, FOR SOLUTION INTRAMUSCULAR; INTRAVENOUS at 06:17

## 2024-11-20 RX ADMIN — PANTOPRAZOLE SODIUM 40 MG: 40 TABLET, DELAYED RELEASE ORAL at 06:06

## 2024-11-20 RX ADMIN — Medication 1 CAPSULE: at 08:27

## 2024-11-20 RX ADMIN — POTASSIUM BICARBONATE 40 MEQ: 782 TABLET, EFFERVESCENT ORAL at 06:06

## 2024-11-20 RX ADMIN — DIPHENHYDRAMINE HYDROCHLORIDE 25 MG: 25 CAPSULE ORAL at 21:12

## 2024-11-20 RX ADMIN — SODIUM CHLORIDE, PRESERVATIVE FREE 10 ML: 5 INJECTION INTRAVENOUS at 08:29

## 2024-11-20 RX ADMIN — ATORVASTATIN CALCIUM 80 MG: 80 TABLET, FILM COATED ORAL at 21:12

## 2024-11-20 RX ADMIN — ASPIRIN 81 MG: 81 TABLET, CHEWABLE ORAL at 08:27

## 2024-11-20 RX ADMIN — AMPICILLIN SODIUM 2000 MG: 2 INJECTION, POWDER, FOR SOLUTION INTRAVENOUS at 08:24

## 2024-11-20 RX ADMIN — DICLOFENAC SODIUM 4 G: 10 GEL TOPICAL at 08:29

## 2024-11-20 RX ADMIN — Medication 15 G: at 08:28

## 2024-11-20 RX ADMIN — DICLOFENAC SODIUM 4 G: 10 GEL TOPICAL at 21:14

## 2024-11-20 RX ADMIN — AMPICILLIN SODIUM 2000 MG: 2 INJECTION, POWDER, FOR SOLUTION INTRAVENOUS at 12:15

## 2024-11-20 RX ADMIN — AMPICILLIN SODIUM 2000 MG: 2 INJECTION, POWDER, FOR SOLUTION INTRAVENOUS at 00:56

## 2024-11-20 ASSESSMENT — PAIN DESCRIPTION - FREQUENCY: FREQUENCY: CONTINUOUS

## 2024-11-20 ASSESSMENT — PAIN - FUNCTIONAL ASSESSMENT: PAIN_FUNCTIONAL_ASSESSMENT: ACTIVITIES ARE NOT PREVENTED

## 2024-11-20 ASSESSMENT — PAIN DESCRIPTION - ONSET: ONSET: ON-GOING

## 2024-11-20 ASSESSMENT — PAIN SCALES - GENERAL
PAINLEVEL_OUTOF10: 1
PAINLEVEL_OUTOF10: 3

## 2024-11-20 ASSESSMENT — PAIN DESCRIPTION - PAIN TYPE: TYPE: CHRONIC PAIN

## 2024-11-20 ASSESSMENT — PAIN DESCRIPTION - DESCRIPTORS: DESCRIPTORS: ACHING

## 2024-11-20 ASSESSMENT — PAIN DESCRIPTION - ORIENTATION: ORIENTATION: MID

## 2024-11-20 ASSESSMENT — PAIN DESCRIPTION - LOCATION: LOCATION: BACK

## 2024-11-20 NOTE — CONSULTS
Palliative Care    Patient: Jeronimo Ortiz Sr. MRN: [id number]  SSN: [id number]    YOB: 1945  Age: 79 y.o.  Sex: male       Date of Request: 11/20/2024  Date of Consult:  11/20/2024  Reason for Consult:  goals of care  Requesting Physician: Dr Guzman     Assessment/Plan:     Principal Diagnosis:    Anorexia  R63.0    Additional Diagnoses:   Advance Care Planning Counseling Z71.89  Encounter for Palliative Care  Z51.5    Palliative Performance Scale (PPS):       Medical Decision Making:   Reviewed and summarized notes from admission to present   Discussed case with appropriate providers- Dr Guzman  Reviewed laboratory and x-ray data from admission to present     Pt resting in recliner, appears comfortable.  Daughter at bedside. Introduced role of PC and reviewed events.  Pt and daughter have very good understanding of his multiple medical issues.  We reviewed his options moving forward.  Pt states he is okay with taking antibiotics for his endocarditis.  He would prefer to speak with his oncologist after discharge, but states he likely will not do any treatment.  Pt denies pain at present.  He does endorse poor appetite.    In addition to the E&M time spent above, an additional 16 minutes was spent on ACP.  Pt reports his daughter his his HCPOA.  We reviewed his wishes regarding life support and resuscitation. Pt requests DNR status, and daughter is supportive.  DNR order placed per his request.  Reviewed option of hospice after discharge, and pt stated he would give it some thought.  He stated \"I just want to live until Thanksgiving.  After that, I don't care!\"  He anticipates family coming into town this weekend, and is looking forward to seeing them.     Will discuss findings with members of the interdisciplinary team.      Thank you for this referral.          .    Subjective:     History obtained from:  Patient, Family, Care Provider, and Chart    Chief Complaint: CVA  History of

## 2024-11-20 NOTE — PROGRESS NOTES
Infectious Disease Follow up      Today's Date: 2024   Admit Date: 2024  YOB: 1945    Impression:   E. Faecalis AV IE  -  Bcx x2+  - 11/15 Bcx x2 negative   - TTE 11/15: Appears to be small mobile echodensities at the base of the noncoronary cusp/right coronary cusp on the ventricular aspect likely suggestive of vegetations.   -GYPSY 24-- small to medium mobile echodensities with severe regurgitation  - MRI noted below but with evidence of mets, infarcts, bleeding, and fusiform aneurysm.   Hyponatremia-hovering around 129 or 130  Recent UTI about 3 weeks ago, done at [facility], no results available for review   Anemia   Bronchogenic cancer of right lung, scheduled for lung surgery with PH  COPD     Plan:   Will stop Ampicillin and transition to Penicillin 20 MU via continuous 24 hr infusion and continue Ceftriaxone 2 g IV q 12 hr. Planning to treat endocarditis for 6 weeks from negative cultures, EOT 24.  Will arrange for PICC placement today.  OPAT orders sent to CM.  I will arrange for ID follow up at mid therapy and EOT.     Anti-infectives:   Rocephin --  Ampicillin --    Subjective:   Interval Events:   Seen working with Speech Therapy. Discussed ID plan with daughter. He will be returning home with her and his granddaughter.    No Known Allergies     Review of Systems:  All systems reviewed and negative except as otherwise stated in the HPI    Objective:   Blood pressure 111/66, pulse 78, temperature 97.5 °F (36.4 °C), temperature source Oral, resp. rate 20, height 1.753 m (5' 9\"), weight 69.4 kg (153 lb), SpO2 98%.  Visit Vitals  /66   Pulse 78   Temp 97.5 °F (36.4 °C) (Oral)   Resp 20   Ht 1.753 m (5' 9\")   Wt 69.4 kg (153 lb)   SpO2 98%   BMI 22.59 kg/m²     Temp (24hrs), Av.7 °F (36.5 °C), Min:97.3 °F (36.3 °C), Max:98.1 °F (36.7 °C)     Patient was seen and examined on 2024 and physical exam remains unchanged since yesterday, unless noted  below:    Exam:   General:  No acute distress   Eyes:  Sclera anicteric and no injection or drainage bilaterally   Lungs:  Clear to auscultation bilaterally, good effort   CV:  Regular rate and rhythm,no murmur, rub, or gallop   Abdomen: Soft, non-tender. bowel sounds normal. non-distended   Extremities: No cyanosis or edema   Skin: No acute rash or lesions   Musculoskeletal: No swelling or deformity in large joints   Lines/Devices:  Intact, no erythema, drainage or tenderness   Psych: A&O x 3       CBC:  Recent Labs     11/17/24  1355 11/20/24  0438   WBC  --  5.9   HGB 8.4* 8.2*   HCT 26.9* 25.4*   PLT  --  283       BMP:  Recent Labs     11/19/24  1248 11/20/24  0438   BUN 20 9   * 135*   K 3.2* 3.4*    106   CO2 20 21         Data Review:   Recent Results (from the past 24 hour(s))   CBC with Auto Differential    Collection Time: 11/20/24  4:38 AM   Result Value Ref Range    WBC 5.9 4.3 - 11.1 K/uL    RBC 3.10 (L) 4.23 - 5.6 M/uL    Hemoglobin 8.2 (L) 13.6 - 17.2 g/dL    Hematocrit 25.4 (L) 41.1 - 50.3 %    MCV 81.9 (L) 82 - 102 FL    MCH 26.5 26.1 - 32.9 PG    MCHC 32.3 31.4 - 35.0 g/dL    RDW 18.6 (H) 11.9 - 14.6 %    Platelets 283 150 - 450 K/uL    MPV 8.0 (L) 9.4 - 12.3 FL    nRBC 0.00 0.0 - 0.2 K/uL    Differential Type AUTOMATED      Neutrophils % 72 43 - 78 %    Lymphocytes % 18 13 - 44 %    Monocytes % 6 4.0 - 12.0 %    Eosinophils % 2 0.5 - 7.8 %    Basophils % 1 0.0 - 2.0 %    Immature Granulocytes % 1 0.0 - 5.0 %    Neutrophils Absolute 4.3 1.7 - 8.2 K/UL    Lymphocytes Absolute 1.1 0.5 - 4.6 K/UL    Monocytes Absolute 0.3 0.1 - 1.3 K/UL    Eosinophils Absolute 0.1 0.0 - 0.8 K/UL    Basophils Absolute 0.0 0.0 - 0.2 K/UL    Immature Granulocytes Absolute 0.0 0.0 - 0.5 K/UL   Basic Metabolic Panel    Collection Time: 11/20/24  4:38 AM   Result Value Ref Range    Sodium 135 (L) 136 - 145 mmol/L    Potassium 3.4 (L) 3.5 - 5.1 mmol/L    Chloride 106 98 - 107 mmol/L    CO2 21 20 - 29 mmol/L

## 2024-11-20 NOTE — PROGRESS NOTES
Nutrition Assessment    Assessment Type: Reassess  Reason for visit:  Malnutrition Screening Tool: Malnutrition Screen  Have you recently lost weight without trying?: 14 to 23 pounds (2 points)  Have you been eating poorly because of a decreased appetite?: Yes (1 point)  Malnutrition Screening Tool Score: 3      Nutrition Intervention/Recommendations:   Food and/or Nutrient Delivery:   Meals and Snacks:  Diet: Continue current order  Medical Food Supplements:   Medical food supplement therapy:  Change Magic Cup (frozen oral supplement) 290 calories, 9 grams protein per 4 ounce serving.     Malnutrition Assessment:11/14  Malnutrition Status: Moderate malnutrition  Context: Chronic Illness  Findings of clinical characteristics of malnutrition:   Energy Intake:  Mild decrease in energy intake (supplementation via oral supplement has contributed caloric and protein density)  Weight Loss:  Greater than 7.5% over 3 months (14% since September)     Body Fat Loss:  No body fat loss (11/14)     Muscle Mass Loss:  Mild muscle mass loss (11/14) Clavicles (pectoralis & deltoids), Temples (temporalis)    Nutrition Assessment:  Food/Nutrition Related History:   11/14: Hx per family at bedside (daughter - lives with her sister and granddaughter) as pt slept throughout majority of visit, they report he has experienced increasing confusion as well.  Pt with decreased interest and declining portions starting after his stroke/TIA in July.  Int[facility] gradually worsened [facility] implementing ensure 1-2 servings daily.  He recently was told to increase to 4 servings daily if he was not eating meals to prepare for upcoming surgery.  Family notes varied acceptance of oral supplements often with pt only accepting to get them to leave him alone.  At times he will eat a decent meal per day.  He no longer wears his dentures.       Weight History:   Wt hx per EMR review:   166# surgical oncology office 10/16/24  177# Pulmonology office

## 2024-11-20 NOTE — PROGRESS NOTES
GOALS: Goal Met  LTG: Patient will maintain adequate hydration/nutrition with optimum safety and efficiency of swallowing function with PO intake without overt signs or symptoms of aspiration for the highest appropriate diet level.   STG: Goals Met (1,2)  Patient will consume easy to chew textures and thin liquids without overt signs or symptoms of airway compromise.  Patient will safely ingest diet trials during therapeutic feedings with SLP without overt signs or symptoms of respiratory compromise in efforts to advance diet.  Patient will complete a Modified Barium Swallow study to fully assess physiology and anatomy of the swallow and determine safest appropriate diet and/or rehabilitation strategies, as medically indicated.(Goal not indicated, discontinue)    SPEECH LANGUAGE PATHOLOGY: DYSPHAGIA Daily Note #1    Acknowledge Order  I  Therapy Time  I   Charges     I  Rehab Caseload Tracker      NAME: Jeronimo Ortiz .  : 1945  MRN: [id number]    ADMISSION DATE: 2024  PRIMARY DIAGNOSIS: CVA (cerebral vascular accident) ([facility])    ICD-10: Treatment Diagnosis: R13.12 Dysphagia, Oropharyngeal Phase    RECOMMENDATIONS   Diet:    Easy to Chew- allow breads/mixed consistencies. Allow family to bring in outside food of regular texture.   Thin Liquids    Medication: as tolerated   Compensatory Swallowing Strategies:   Slow rate of intake  Small bites/sips  Upright as possible for all oral intake  Ensure dentures are in prior to PO   Therapeutic Intervention:   Patient/family education  Dysphagia treatment   Patient continues to require skilled intervention:  No. Please re-consult if new concerns arise.      Anticipated Discharge Needs: Do not anticipate ongoing speech therapy needs upon discharge.      ASSESSMENT    Patient presents with mild oral dysphagia secondary to ill fitting dentures. Noted with mildly prolonged mastication with peaches, otherwise functional. No overt signs or symptoms of airway

## 2024-11-20 NOTE — CARE COORDINATION
All orders and clinical information available and necessary faxed to Elsa RUVALCABA  Ying Harris ([phone]) and the [facility] primary care team ([phone]) in order to initiate home infusion arrangements and home health services.  Intramed infusion agency liaison, Tony Mays, following and will communicate directly with [facility] primary care team regarding OPAT and once confirmed he will contact patient's daughter to set up training at the bedside.  [facility] home health will follow patient and will communicate directly with [facility] if necessary as patient does have Medicare coverage as well that the agency can bill.  Anticipated D/C date is Friday as [facility] typically takes 48 hours to finalize OPAT details. Patient and daughter have been updated at bedside and are agreeable to this transitions of care plan.  Charge nurse notified that PICC team can place line when time available.

## 2024-11-20 NOTE — PROGRESS NOTES
[facility] CARDIOLOGY PROGRESS NOTE           11/20/2024 9:33 AM    Admit Date: 11/16/2024      Subjective:       Review of Systems        Objective:      Vitals:    11/16/24 1020 11/20/24 0000 11/20/24 0424 11/20/24 0808   BP:  (!) 129/50 (!) 128/45 (!) 125/40   Pulse:  86 76 72   Resp:  18 16 20   Temp:  98.1 °F (36.7 °C) 97.5 °F (36.4 °C) 97.3 °F (36.3 °C)   TempSrc:  Axillary Axillary Oral   SpO2:  93% 94% 95%   Weight: 69.4 kg (153 lb)      Height: 1.753 m (5' 9\")            Physical Exam  Vitals reviewed.   HENT:      Head: Normocephalic.      Right Ear: External ear normal.      Left Ear: External ear normal.      Nose: Nose normal.   Eyes:      General: No scleral icterus.  Cardiovascular:      Heart sounds: Murmur heard.   Pulmonary:      Effort: Pulmonary effort is normal.   Abdominal:      General: There is no distension.   Musculoskeletal:      Cervical back: Neck supple.   Skin:     General: Skin is warm.   Neurological:      Mental Status: He is alert. Mental status is at baseline.         Data Review:   Recent Labs     11/17/24  1355 11/19/24  1248 11/20/24  0438   NA  --  133* 135*   K  --  3.2* 3.4*   BUN  --  20 9   WBC  --   --  5.9   HGB 8.4*  --  8.2*   HCT 26.9*  --  25.4*   PLT  --   --  283       [unfilled]  Current Facility-Administered Medications   Medication Dose Route Frequency    diclofenac sodium (VOLTAREN) 1 % gel 4 g  4 g Topical BID    pantoprazole (PROTONIX) tablet 40 mg  40 mg Oral QAM AC    aspirin chewable tablet 81 mg  81 mg Oral Daily    urea (URE-NA) packet 15 g  15 g Oral Daily    ampicillin (OMNIPEN) 2,000 mg in sodium chloride 0.9 % 100 mL IVPB (mini-bag)  2,000 mg IntraVENous Q4H    atorvastatin (LIPITOR) tablet 80 mg  80 mg Oral Daily    cefTRIAXone (ROCEPHIN) 2,000 mg in sodium chloride 0.9 % 50 mL IVPB (mini-bag)  2,000 mg IntraVENous Q12H    lactobacillus (CULTURELLE) capsule 1 capsule  1 capsule Oral Daily with breakfast    acetaminophen  (TYLENOL) tablet 650 mg  650 mg Oral Q6H PRN    Or    acetaminophen (TYLENOL) suppository 650 mg  650 mg Rectal Q6H PRN    labetalol (NORMODYNE;TRANDATE) injection 5 mg  5 mg IntraVENous Q4H PRN    magnesium sulfate 2000 mg in 50 mL IVPB premix  2,000 mg IntraVENous PRN    ondansetron (ZOFRAN-ODT) disintegrating tablet 4 mg  4 mg Oral Q8H PRN    Or    ondansetron (ZOFRAN) injection 4 mg  4 mg IntraVENous Q6H PRN    polyethylene glycol (GLYCOLAX) packet 17 g  17 g Oral Daily PRN    potassium chloride (KLOR-CON M) extended release tablet 40 mEq  40 mEq Oral PRN    Or    potassium bicarb-citric acid (EFFER-K) effervescent tablet 40 mEq  40 mEq Oral PRN    Or    potassium chloride 10 mEq/100 mL IVPB (Peripheral Line)  10 mEq IntraVENous PRN    sodium chloride flush 0.9 % injection 5-40 mL  5-40 mL IntraVENous PRN    levETIRAcetam (KEPPRA) tablet 500 mg  500 mg Oral BID    sodium chloride flush 0.9 % injection 5-40 mL  5-40 mL IntraVENous 2 times per day    0.9 % sodium chloride infusion   IntraVENous PRN           Assessment/Plan:     79 y.o. male history of CVA aortic valve vegetation Enterococcus bacteremia severe aortic regurgitation    CVA  Prior history of CVA previously on anticoagulation therapy.  Current presentation is concerning for septic emboli from endocarditis additionally concern for hemorrhagic metastatic disease.  Currently anticoagulation is on hold due to findings described above    Aortic valve vegetation  Enterococcus bacteremia with leaflet perforation/degeneration noted on transesophageal echocardiogram.  Due to septic emboli and valvular dysfunction, this would be a surgically corrective disease process.  Unfortunately the patient is not a candidate for surgery at this time due septic emboli and contraindications to heparinization for CT surgery.  There would be a at least a 2-week waiting period before surgery could safely be considered.  Additionally MRI imaging is concerning for metastatic

## 2024-11-20 NOTE — ACP (ADVANCE CARE PLANNING)
In addition to the E&M time spent, an additional 16 minutes was spent on ACP. Pt reports his daughter his his HCPOA. We reviewed his wishes regarding life support and resuscitation. Pt requests DNR status, and daughter is supportive. DNR order placed per his request. Reviewed option of hospice after discharge, and pt stated he would give it some thought. He stated \"I just want to live until Thanksgiving. After that, I don't care!\" He anticipates family coming into town this weekend, and is looking forward to seeing them.      Mishel Recio, FNP-C  Palliative Care

## 2024-11-20 NOTE — PROGRESS NOTES
Hospitalist Progress Note   Admit Date:  2024 10:03 AM   Name:  Jeronimo Ortiz Sr.   Age:  79 y.o.  Sex:  male  :  1945   MRN:  [id number]   Room:  [facility]/01    Presenting/Chief Complaint: No chief complaint on file.     Reason(s) for Admission: Cerebral hemorrhage (HCC) [I61.9]     Hospital Course:   Jeronimo Ortiz Sr. is a 79 y.o. male with medical history of CVA, lung cancer, hypertension, COPD, on Eliquis secondary to stroke and DVT prophylaxis in setting of cancer.  Patient initially presented secondary to altered mental status and found to have acute stroke, endocarditis and Enterococcus bacteremia.  MRI brain showing acute to subacute infarct in left temporal/parietal lobe and small petechial hemorrhage.  MRI also concerning for metastases.  Patient underwent GYPSY showing vegetation.  At this time plan for long-term antibiotics and working on getting this set up outpatient      Subjective & 24hr Events:   Patient resting comfortably on examination this morning.  Patient has been ambulating through the hallways and denied any current complaints on exam.      Assessment & Plan:     Infective endocarditis  Enterococcus bacteremia  TTE showing small mobile echodensity  GYPSY again showing small mobile echodensity  ID consulted  Continue Rocephin and Unasyn rrhage of left cerebral hemisphere (HCC)    CVA  Petechial parietal lobe hemorrhage  MRI brain showing acute stroke and small petechial hemorrhage  Concern for metastatic disease versus embolic disease from endocarditis  Neurosurgery evaluated no acute interventions at this time  Keppra for prophylaxis  Neurology following  Aspirin daily.  Holding anticoagulation in setting of hemorrhagic CNS metastases  PT and OT to evaluate and speech    Hypertension  Blood pressure adequately controlled off home blood pressure medication  Continue to monitor    Hyperlipidemia  Statin    Bronchogenic lung cancer  Follows with Karina as

## 2024-11-21 LAB
BASOPHILS # BLD: 0 K/UL (ref 0–0.2)
BASOPHILS NFR BLD: 1 % (ref 0–2)
DIFFERENTIAL METHOD BLD: ABNORMAL
EOSINOPHIL # BLD: 0.1 K/UL (ref 0–0.8)
EOSINOPHIL NFR BLD: 2 % (ref 0.5–7.8)
ERYTHROCYTE [DISTWIDTH] IN BLOOD BY AUTOMATED COUNT: 18.6 % (ref 11.9–14.6)
HCT VFR BLD AUTO: 25.8 % (ref 41.1–50.3)
HGB BLD-MCNC: 8.2 G/DL (ref 13.6–17.2)
IMM GRANULOCYTES # BLD AUTO: 0 K/UL (ref 0–0.5)
IMM GRANULOCYTES NFR BLD AUTO: 0 % (ref 0–5)
LYMPHOCYTES # BLD: 1.1 K/UL (ref 0.5–4.6)
LYMPHOCYTES NFR BLD: 17 % (ref 13–44)
MCH RBC QN AUTO: 26.3 PG (ref 26.1–32.9)
MCHC RBC AUTO-ENTMCNC: 31.8 G/DL (ref 31.4–35)
MCV RBC AUTO: 82.7 FL (ref 82–102)
MONOCYTES # BLD: 0.5 K/UL (ref 0.1–1.3)
MONOCYTES NFR BLD: 7 % (ref 4–12)
NEUTS SEG # BLD: 4.9 K/UL (ref 1.7–8.2)
NEUTS SEG NFR BLD: 73 % (ref 43–78)
NRBC # BLD: 0 K/UL (ref 0–0.2)
PLATELET # BLD AUTO: 316 K/UL (ref 150–450)
PMV BLD AUTO: 8.2 FL (ref 9.4–12.3)
POTASSIUM SERPL-SCNC: 3.4 MMOL/L (ref 3.5–5.1)
RBC # BLD AUTO: 3.12 M/UL (ref 4.23–5.6)
WBC # BLD AUTO: 6.6 K/UL (ref 4.3–11.1)

## 2024-11-21 PROCEDURE — 97535 SELF CARE MNGMENT TRAINING: CPT

## 2024-11-21 PROCEDURE — C1751 CATH, INF, PER/CENT/MIDLINE: HCPCS

## 2024-11-21 PROCEDURE — 6370000000 HC RX 637 (ALT 250 FOR IP)

## 2024-11-21 PROCEDURE — 05HY33Z INSERTION OF INFUSION DEVICE INTO UPPER VEIN, PERCUTANEOUS APPROACH: ICD-10-PCS | Performed by: INTERNAL MEDICINE

## 2024-11-21 PROCEDURE — 6360000002 HC RX W HCPCS: Performed by: NURSE PRACTITIONER

## 2024-11-21 PROCEDURE — 1100000003 HC PRIVATE W/ TELEMETRY

## 2024-11-21 PROCEDURE — 6360000002 HC RX W HCPCS: Performed by: INTERNAL MEDICINE

## 2024-11-21 PROCEDURE — 36415 COLL VENOUS BLD VENIPUNCTURE: CPT

## 2024-11-21 PROCEDURE — 2580000003 HC RX 258: Performed by: NURSE PRACTITIONER

## 2024-11-21 PROCEDURE — 36569 INSJ PICC 5 YR+ W/O IMAGING: CPT

## 2024-11-21 PROCEDURE — 84132 ASSAY OF SERUM POTASSIUM: CPT

## 2024-11-21 PROCEDURE — 85025 COMPLETE CBC W/AUTO DIFF WBC: CPT

## 2024-11-21 PROCEDURE — 99232 SBSQ HOSP IP/OBS MODERATE 35: CPT | Performed by: INTERNAL MEDICINE

## 2024-11-21 PROCEDURE — 6370000000 HC RX 637 (ALT 250 FOR IP): Performed by: INTERNAL MEDICINE

## 2024-11-21 PROCEDURE — 97530 THERAPEUTIC ACTIVITIES: CPT

## 2024-11-21 PROCEDURE — 2580000003 HC RX 258: Performed by: INTERNAL MEDICINE

## 2024-11-21 RX ADMIN — SODIUM CHLORIDE, PRESERVATIVE FREE 10 ML: 5 INJECTION INTRAVENOUS at 08:44

## 2024-11-21 RX ADMIN — ASPIRIN 81 MG: 81 TABLET, CHEWABLE ORAL at 08:44

## 2024-11-21 RX ADMIN — PANTOPRAZOLE SODIUM 40 MG: 40 TABLET, DELAYED RELEASE ORAL at 06:10

## 2024-11-21 RX ADMIN — LEVETIRACETAM 500 MG: 500 TABLET, FILM COATED ORAL at 08:44

## 2024-11-21 RX ADMIN — DICLOFENAC SODIUM 4 G: 10 GEL TOPICAL at 08:55

## 2024-11-21 RX ADMIN — DIPHENHYDRAMINE HYDROCHLORIDE 25 MG: 25 CAPSULE ORAL at 22:13

## 2024-11-21 RX ADMIN — CEFTRIAXONE SODIUM 2000 MG: 2 INJECTION, POWDER, FOR SOLUTION INTRAMUSCULAR; INTRAVENOUS at 06:11

## 2024-11-21 RX ADMIN — LEVETIRACETAM 500 MG: 500 TABLET, FILM COATED ORAL at 22:13

## 2024-11-21 RX ADMIN — SODIUM CHLORIDE, PRESERVATIVE FREE 10 ML: 5 INJECTION INTRAVENOUS at 22:14

## 2024-11-21 RX ADMIN — POTASSIUM BICARBONATE 40 MEQ: 782 TABLET, EFFERVESCENT ORAL at 06:10

## 2024-11-21 RX ADMIN — Medication 15 G: at 08:49

## 2024-11-21 RX ADMIN — SODIUM CHLORIDE 20 MILLION UNITS: 9 INJECTION, SOLUTION INTRAVENOUS at 16:21

## 2024-11-21 RX ADMIN — Medication 1 CAPSULE: at 08:44

## 2024-11-21 RX ADMIN — CEFTRIAXONE SODIUM 2000 MG: 2 INJECTION, POWDER, FOR SOLUTION INTRAMUSCULAR; INTRAVENOUS at 18:26

## 2024-11-21 RX ADMIN — ATORVASTATIN CALCIUM 80 MG: 80 TABLET, FILM COATED ORAL at 22:13

## 2024-11-21 ASSESSMENT — PAIN SCALES - GENERAL: PAINLEVEL_OUTOF10: 0

## 2024-11-21 NOTE — PROGRESS NOTES
[facility] CARDIOLOGY PROGRESS NOTE           11/21/2024 12:39 PM    Admit Date: 11/16/2024      Subjective:   Patient denies shortness of breath, leg swelling, chest pain or pressure, fevers or chills, dizziness or lightheadedness.  Denies abdominal pain or discomfort, nausea, vomiting.     ROS:  Cardiovascular:  As noted above    Objective:      Vitals:    11/20/24 2000 11/21/24 0000 11/21/24 0400 11/21/24 0800   BP: (!) 119/41 (!) 123/49 139/66 (!) 131/46   Pulse: 75 78 88 75   Resp: 18 18 18 18   Temp: 97.7 °F (36.5 °C) 98.1 °F (36.7 °C) 97.4 °F (36.3 °C) 97.4 °F (36.3 °C)   TempSrc: Oral Oral Axillary Oral   SpO2: 95% 95% 96% 97%   Weight:       Height:           Physical Exam:  General-No Acute Distress, awake, alert, no active  Neck- supple, no JVD  CV- regular rate and rhythm diastolic murmur at the base right side greater than left.  Lung- clear bilaterally with nonlabored respirations, no wheezes or rales  Abd- soft, nontender, nondistended  Ext- no edema bilaterally.  Skin- warm and dry    Data Review:   Recent Labs     11/19/24  1248 11/20/24  0438 11/21/24  0353   * 135*  --    K 3.2* 3.4* 3.4*   BUN 20 9  --    WBC  --  5.9 6.6   HGB  --  8.2* 8.2*   HCT  --  25.4* 25.8*   PLT  --  283 316       Assessment/Plan:     Active Hospital Problems    Severe aortic regurgitation     Aortic valve endocarditis     Bacteremia  -Seen by CT surgery, deemed not a surgical candidate.  - Hemodynamically stable at this time on antibiotics as per ID service.  PICC line to be placed today.      Cerebral hemorrhage (HCC)    Cerebrovascular accident, hemorrhagic (HCC)    Nontraumatic cortical hemorrhage of left cerebral hemisphere (HCC)    Intracranial hemorrhage (HCC)    CVA (cerebral vascular accident) (HCC)  -Per primary team       Severe anemia  -No overt bleeding on examination today.      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

## 2024-11-21 NOTE — CONSULTS
PICC Placement Note    PRE-PROCEDURE VERIFICATION    Correct Procedure: yes  Time out completed with assistant Heather Jett RN and all persons present in agreement with time out.  Risks and benefits reviewed with patient and informed consent obtained prior to assessment and procedure.     Correct Site: yes  Temperature: Temp: 97.4 °F (36.3 °C), Temperature Source:    Recent Labs     11/20/24  0438 11/21/24  0353   BUN 9  --     316   WBC 5.9 6.6     Allergies: Patient has no known allergies.  Education materials for PICC Care given to patient or family.    PROCEDURE DETAIL  A double lumen PICC line was started for antibiotic therapy. The following documentation is in addition to the PICC properties in the lines/airways flowsheet:    Lidocaine used: Yes  Mid-Arm Circumference: 27 (cm)  Internal Catheter Length: 45 (cm)  External Catheter Length: 0 (cm)  Total Catheter Length: 45 (cm)  Vein Selection for PICC: left brachial  Central Line Insertion Bundle followed: Yes  Complication Related to Insertion: none    Both the insertion guidewire and ECG guidewire were removed intact all ports have positive blood return and were flush well with normal saline.    The location of the tip of the PICC is verified using ECG technology.  The tip is in the SVC per ECG reading.  See image below.     Line is okay to use: yes          Lina Cooley RN

## 2024-11-21 NOTE — CARE COORDINATION
Confirmation received that patient's care manager Ying Harris has received the  orders and corresponding information went sent via [employer] as the hospital and her fax are not connecting properly.  She has confirmed the [facility] primary care infusion team has received the infusion information [facility]  [facility] home health has been arranged and following for when patient discharges.  Dual lumen PICC was placed today.

## 2024-11-21 NOTE — PROGRESS NOTES
PT was in bed with Family at bedside. CH introduced self. CH checked for unmet needs and offered support.    Rev. Shawna Cason M.Div.

## 2024-11-21 NOTE — PROGRESS NOTES
Hospitalist Progress Note   Admit Date:  2024 10:03 AM   Name:  Jeronimo Ortiz Sr.   Age:  79 y.o.  Sex:  male  :  1945   MRN:  [id number]   Room:  [facility]/01    Presenting/Chief Complaint: No chief complaint on file.     Reason(s) for Admission: Cerebral hemorrhage (HCC) [I61.9]     Hospital Course:   Jeronimo Ortiz Sr. is a 79 y.o. male with medical history of CVA, lung cancer, hypertension, COPD, on Eliquis secondary to stroke and DVT prophylaxis in setting of cancer.  Patient initially presented secondary to altered mental status and found to have acute stroke, endocarditis and Enterococcus bacteremia.  MRI brain showing acute to subacute infarct in left temporal/parietal lobe and small petechial hemorrhage.  MRI also concerning for metastases.  Patient underwent GYPSY showing vegetation.  Deemed not a candidate by CV surgery.  Palliative care consulted during admission.  Considering hospice and states his intention is to live until Thanksgiving.  PICC line placed during admission for antibiotic course.  [facility] to [facility]    Subjective & 24hr Events:   No acute events last 24 hours.  Denies any fevers or chills.      Assessment & Plan:     Infective endocarditis  Enterococcus bacteremia  TTE showing small mobile echodensity  GYPSY again showing small mobile echodensity  Not a surgical candidate  ID consulted.  On penicillin/ceftriaxone for synergistic treatment       CVA  Petechial parietal lobe hemorrhage  MRI brain showing acute stroke and small petechial hemorrhage  Concern for metastatic disease versus embolic disease from endocarditis  Neurosurgery evaluated no acute interventions at this time  Keppra for prophylaxis  Neurology following  Aspirin daily.  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    Current Meds:  Current Facility-Administered Medications   Medication Dose Route Frequency    penicillin G potassium 20 Million Units in sodium chloride 0.9 % 1,000 mL infusion  20 Million Units IntraVENous Q24H    diphenhydrAMINE (BENADRYL) capsule 25 mg  25 mg Oral Nightly PRN    diclofenac sodium (VOLTAREN) 1 % gel 4 g  4 g Topical BID    pantoprazole (PROTONIX) tablet 40 mg  40 mg Oral QAM AC    aspirin chewable tablet 81 mg  81 mg Oral Daily    urea (URE-NA) packet 15 g  15 g Oral Daily    atorvastatin (LIPITOR) tablet 80 mg  80 mg Oral Daily    cefTRIAXone (ROCEPHIN) 2,000 mg in sodium chloride 0.9 % 50 mL IVPB (mini-bag)  2,000 mg IntraVENous Q12H    lactobacillus (CULTURELLE) capsule 1 capsule  1 capsule Oral Daily with breakfast    acetaminophen (TYLENOL) tablet 650 mg  650 mg Oral Q6H PRN    Or    acetaminophen (TYLENOL) suppository 650 mg  650 mg Rectal Q6H PRN    labetalol (NORMODYNE;TRANDATE) injection 5 mg  5 mg IntraVENous Q4H PRN    magnesium sulfate 2000 mg in 50 mL IVPB premix  2,000 mg IntraVENous PRN    ondansetron (ZOFRAN-ODT) disintegrating tablet 4 mg  4 mg Oral Q8H PRN    Or    ondansetron (ZOFRAN) injection 4 mg  4 mg IntraVENous Q6H PRN    polyethylene glycol (GLYCOLAX) packet 17 g  17 g Oral Daily PRN    potassium chloride (KLOR-CON M) extended release tablet 40 mEq  40 mEq Oral PRN    Or    potassium bicarb-citric acid (EFFER-K) effervescent tablet 40 mEq  40 mEq Oral PRN    Or    potassium chloride 10 mEq/100 mL IVPB (Peripheral Line)  10 mEq IntraVENous PRN    sodium chloride flush 0.9 % injection 5-40 mL  5-40 mL IntraVENous PRN    levETIRAcetam (KEPPRA) tablet 500 mg  500 mg Oral BID    sodium chloride flush 0.9 %

## 2024-11-21 NOTE — PROGRESS NOTES
ACUTE OCCUPATIONAL THERAPY GOALS:   (Developed with and agreed upon by patient and/or caregiver.)  1. Patient will complete lower body bathing and dressing with MODIFIED INDEPENDENCE and adaptive equipment as needed.     2. Patient will complete toilet transfers and toileting with MODIFIED INDEPENDENCE.  3. Patient will complete self-grooming ADL tasks at standing level with MODIFIED INDEPENDENCE.  4. Patient will tolerate 25 minutes of OT treatment with 1-2 rest breaks to increase activity tolerance for ADLs.   5. Patient will complete functional transfers with MODIFIED INDEPENDENCE and adaptive equipment as needed.   6. Patient will tolerate 10 minutes BUE exercises to increase strength for safe, functional transfers.      Timeframe: 7 visits    OCCUPATIONAL THERAPY: Daily Note PM   OT Visit Days: 2   Time In/Out  OT Charge Capture  Rehab Caseload Tracker  OT Orders    Jeronimo Ortiz  is a 79 y.o. male   PRIMARY DIAGNOSIS: CVA (cerebral vascular accident) (HCC)  Cerebral hemorrhage (HCC) [I61.9]       Inpatient: Payor: [employer] / Plan: [employer] / Product Type: *No Product type* /     ASSESSMENT:     REHAB RECOMMENDATIONS:   Recommendation to date pending progress:  Setting:  Home Health Therapy vs no needs    Equipment:    To Be Determined     ASSESSMENT:  Mr. Ortiz is doing well today. Pt presents sitting up in chair. Pt was able to perform functional mobility in hallway with no AD. No loss of balance or fatigue noticed. Pt returned to room and performed ADLs while seated at the sink. Did well. Did not need any assistance with tasks. Pt left in chair with all needs in reach. Making progress with goals. Will continue to benefit from skilled OT during stay.       SUBJECTIVE:     Mr. Ortiz states, \"What happened to the rest of my ear\"     Social/Functional Lives With: Daughter, Family  Type of Home: Mobile home  Home Layout: One level  Home Access: Ramped entrance  Bathroom Shower/Tub: Walk-in  Assistance, CGA=Contact Guard Assistance, Min=Minimal Assistance, Mod=Moderate Assistance, Max=Maximal Assistance, Total=Total Assistance, NT=Not Tested    BALANCE: Good Fair+ Fair Fair- Poor NT Comments   Sitting Static [x] [] [] [] [] []    Sitting Dynamic [] [x] [] [] [] []              Standing Static [] [x] [] [] [] []    Standing Dynamic [] [] [x] [] [] []        PLAN:     FREQUENCY/DURATION   OT Plan of Care: 3 times/week for duration of hospital stay or until stated goals are met, whichever comes first.    TREATMENT:     TREATMENT:   Therapeutic Activity (13 Minutes): Patient participated in therapeutic activities including functional transfer training, functional mobility of household distances, sitting tolerance activity, and standing tolerance activity with minimal verbal cues and tactile cues in order to increase independence, increase safety awareness, increase activity tolerance, increase coordination, decrease assistance required, and prepare for discharge home.   Self Care (26 minutes): Patient participated in upper body bathing, lower body bathing, upper body dressing, lower body dressing, and grooming ADLs in supported sitting and standing with minimal verbal and tactile cueing to increase independence, decrease assistance required, and increase activity tolerance. Patient also participated in functional mobility and functional transfer training to increase independence, decrease assistance required, and increase activity tolerance. The patient was educated on strategies to improve safety and patient verbalized understanding and demonstrated understanding.     TREATMENT GRID:  N/A    AFTER TREATMENT PRECAUTIONS: Bed/Chair Locked, Call light within reach, Chair, Needs within reach, and Visitors at bedside    INTERDISCIPLINARY COLLABORATION:  RN/ PCT and OT/ UMAÑA    EDUCATION:       TOTAL TREATMENT DURATION AND TIME:  Time In: 1306  Time Out: 1345  Minutes: 39    BILLIE Galicia

## 2024-11-21 NOTE — PROGRESS NOTES
Infectious Disease Follow up      Today's Date: 2024   Admit Date: 2024  YOB: 1945    Impression:   E. Faecalis AV IE  -  Bcx x2+  - 11/15 Bcx x2 negative   - TTE 11/15: Appears to be small mobile echodensities at the base of the noncoronary cusp/right coronary cusp on the ventricular aspect likely suggestive of vegetations.   -GYPSY 24-- small to medium mobile echodensities with severe regurgitation  - MRI noted below but with evidence of mets, infarcts, bleeding, and fusiform aneurysm.   Hyponatremia-hovering around 129 or 130  Recent UTI about 3 weeks ago, done at [facility], no results available for review   Anemia   Bronchogenic cancer of right lung, scheduled for lung surgery with PH  COPD     Plan:   Will stop Ampicillin and transition to Penicillin 20 MU via continuous 24 hr infusion and continue Ceftriaxone 2 g IV q 12 hr. Planning to treat endocarditis for 6 weeks from negative cultures, EOT 24.     Follow up with ID at Mid-therapy on 24 at 2PM with Casper and EOT 24 at 2:20 with Phillip.    Anti-infectives:   Rocephin --  Ampicillin --    Subjective:   Interval Events:   Seen having PICC placed. Discussed follow ups in ID office.     No Known Allergies     Review of Systems:  All systems reviewed and negative except as otherwise stated in the HPI    Objective:   Blood pressure (!) 129/42, pulse 77, temperature 98.1 °F (36.7 °C), temperature source Oral, resp. rate 18, height 1.753 m (5' 9\"), weight 69.4 kg (153 lb), SpO2 98%.  Visit Vitals  BP (!) 129/42   Pulse 77   Temp 98.1 °F (36.7 °C) (Oral)   Resp 18   Ht 1.753 m (5' 9\")   Wt 69.4 kg (153 lb)   SpO2 98%   BMI 22.59 kg/m²     Temp (24hrs), Av.7 °F (36.5 °C), Min:97.3 °F (36.3 °C), Max:98.1 °F (36.7 °C)     Patient was seen and examined on 2024 and physical exam remains unchanged since yesterday, unless noted below:    Exam:   General:  No acute distress   Eyes:  Sclera anicteric and no

## 2024-11-22 VITALS
DIASTOLIC BLOOD PRESSURE: 39 MMHG | OXYGEN SATURATION: 97 % | HEART RATE: 75 BPM | BODY MASS INDEX: 22.66 KG/M2 | RESPIRATION RATE: 18 BRPM | HEIGHT: 69 IN | SYSTOLIC BLOOD PRESSURE: 122 MMHG | TEMPERATURE: 97.7 F | WEIGHT: 153 LBS

## 2024-11-22 LAB
MAGNESIUM SERPL-MCNC: 1.4 MG/DL (ref 1.8–2.4)
POTASSIUM SERPL-SCNC: 3.4 MMOL/L (ref 3.5–5.1)

## 2024-11-22 PROCEDURE — 6370000000 HC RX 637 (ALT 250 FOR IP): Performed by: INTERNAL MEDICINE

## 2024-11-22 PROCEDURE — 84132 ASSAY OF SERUM POTASSIUM: CPT

## 2024-11-22 PROCEDURE — 6370000000 HC RX 637 (ALT 250 FOR IP)

## 2024-11-22 PROCEDURE — 6360000002 HC RX W HCPCS: Performed by: INTERNAL MEDICINE

## 2024-11-22 PROCEDURE — 2580000003 HC RX 258: Performed by: INTERNAL MEDICINE

## 2024-11-22 PROCEDURE — 83735 ASSAY OF MAGNESIUM: CPT

## 2024-11-22 PROCEDURE — 36415 COLL VENOUS BLD VENIPUNCTURE: CPT

## 2024-11-22 RX ORDER — ATORVASTATIN CALCIUM 80 MG/1
80 TABLET, FILM COATED ORAL DAILY
Qty: 30 TABLET | Refills: 3 | Status: SHIPPED | OUTPATIENT
Start: 2024-11-22

## 2024-11-22 RX ORDER — DIPHENHYDRAMINE HCL 25 MG
25 CAPSULE ORAL NIGHTLY PRN
Qty: 10 CAPSULE | Refills: 0 | Status: SHIPPED | OUTPATIENT
Start: 2024-11-22 | End: 2024-12-02

## 2024-11-22 RX ORDER — LEVETIRACETAM 500 MG/1
500 TABLET ORAL 2 TIMES DAILY
Qty: 60 TABLET | Refills: 3 | Status: SHIPPED | OUTPATIENT
Start: 2024-11-22

## 2024-11-22 RX ORDER — LACTOBACILLUS RHAMNOSUS GG 10B CELL
1 CAPSULE ORAL
Qty: 30 CAPSULE | Refills: 0 | Status: SHIPPED | OUTPATIENT
Start: 2024-11-23 | End: 2024-12-23

## 2024-11-22 RX ORDER — ASPIRIN 81 MG/1
81 TABLET, CHEWABLE ORAL DAILY
Qty: 30 TABLET | Refills: 3 | Status: SHIPPED | OUTPATIENT
Start: 2024-11-23

## 2024-11-22 RX ADMIN — CEFTRIAXONE SODIUM 2000 MG: 2 INJECTION, POWDER, FOR SOLUTION INTRAMUSCULAR; INTRAVENOUS at 05:27

## 2024-11-22 RX ADMIN — POTASSIUM CHLORIDE 40 MEQ: 1500 TABLET, EXTENDED RELEASE ORAL at 10:58

## 2024-11-22 RX ADMIN — LEVETIRACETAM 500 MG: 500 TABLET, FILM COATED ORAL at 08:16

## 2024-11-22 RX ADMIN — SODIUM CHLORIDE, PRESERVATIVE FREE 10 ML: 5 INJECTION INTRAVENOUS at 08:16

## 2024-11-22 RX ADMIN — PANTOPRAZOLE SODIUM 40 MG: 40 TABLET, DELAYED RELEASE ORAL at 05:24

## 2024-11-22 RX ADMIN — ASPIRIN 81 MG: 81 TABLET, CHEWABLE ORAL at 08:16

## 2024-11-22 RX ADMIN — Medication 15 G: at 08:16

## 2024-11-22 RX ADMIN — MAGNESIUM SULFATE HEPTAHYDRATE 2000 MG: 40 INJECTION, SOLUTION INTRAVENOUS at 11:05

## 2024-11-22 RX ADMIN — Medication 1 CAPSULE: at 08:16

## 2024-11-22 ASSESSMENT — PAIN SCALES - GENERAL: PAINLEVEL_OUTOF10: 0

## 2024-11-22 NOTE — PROGRESS NOTES
Discharge instructions and prescriptions provided and explained to the pt. Discussed discharge instructions with family at bedside. Opportunity for questions provided.  Instructed to call once ready to leave.

## 2024-11-22 NOTE — DISCHARGE SUMMARY
hemorrhage, demonstrated on the gradient images only. 2. There is a 0.4 x 0.4 cm oval enhancing focus involving the anterior medial left occipital cortex. Given the endobronchial mass demonstrated on the 7/26/2024 examination, this is most consistent with a cortical metastasis. There is mild surrounding vasogenic edema without significant associated mass effect. 3. Corresponding to the left parietal lobe hyperdensity on the prior head CT, there are 2 small adjacent areas of intraparenchymal hemorrhage and adjacent cerebral convexity subarachnoid hemorrhage. The small areas of hemorrhage measure 0.8 x 0.7 cm and 0.5 cm. The larger hemorrhage minimally enhances. Given the round morphology of the hemorrhage and the above described separate left occipital cortical mass, these favor hemorrhagic metastases. The adjacent leptomeningeal enhancement is favored to be reactive, but subtle leptomeningeal disease cannot be completely excluded. 4. Unchanged 6 mm oval area of prominence at the bifurcation of the azygos left anterior cerebral artery in the interhemispheric fissure. This favors a fusiform aneurysm. 5. Full features are otherwise as detailed above. Discussed with Dr. Guzman by Dr. Ospina at 4:31 p.m 11/15/2024. Electronically signed by CHENCHO OSPINA    CT HEAD WO CONTRAST    Result Date: 11/15/2024  No significant changes compared to prior study with a small 14 mm area of suspect intraparenchymal hemorrhage in the medial left cerebral hemisphere along the posterior falx versus a hemorrhagic metastasis given history of lung cancer. Stable old infarcts in the left putamen/corona radiata as well as a small old lacunar infarct at the right putamen. No new acute intraparenchymal hemorrhage or acute infarction. Stable severe chronic right frontal sinusitis with mild chronic left frontal sinusitis. Electronically signed by WILLY HEARD    CT HEAD WO CONTRAST    Result Date: 11/14/2024  Small 14 mm area of suspect      Staphylococcus epidermidis by PCR NOT DETECTED        Staphylococcus lugdunensis by PCR NOT DETECTED        STREPTOCOCCUS NOT DETECTED        Streptococcus agalactiae (Group B) NOT DETECTED        Strep pneumoniae NOT DETECTED        Strep pyogenes,(Grp. A) NOT DETECTED        Acinetobacter calcoac baumannii complex by PCR NOT DETECTED        Bacteroides fragilis by PCR NOT DETECTED        Enterobacteriaceae by PCR NOT DETECTED        Enterobacter cloacae complex by PCR NOT DETECTED        Escherichia Coli NOT DETECTED        Klebsiella aerogenes by PCR NOT DETECTED        Klebsiella oxytoca by PCR NOT DETECTED        Klebsiella pneumoniae group by PCR NOT DETECTED        Proteus by PCR NOT DETECTED        Salmonella species by PCR NOT DETECTED        Serratia marcescens by PCR NOT DETECTED        Haemophilus Influenzae by PCR NOT DETECTED        Neisseria meningitidis by PCR NOT DETECTED        Pseudomonas aeruginosa NOT DETECTED        Stenotrophomonas maltophilia by PCR NOT DETECTED        Candida albicans by PCR NOT DETECTED        Candida auris by PCR NOT DETECTED        Candida glabrata NOT DETECTED        Candida krusei by PCR NOT DETECTED        Candida parapsilosis by PCR NOT DETECTED        Candida tropicalis by PCR NOT DETECTED        Cryptococcus neoformans/gattii by PCR NOT DETECTED        Resistant gene targets          Vancomycin resistance gene NOT DETECTED        [employer] test comment       False positive results may rarely occur. Correlate with clinical,epidemiologic, and other laboratory findings           Comment: Please see BCID Interpretation Guide in EPIC Links               All Labs from Last 24 Hrs:  Recent Results (from the past 24 hour(s))   Magnesium    Collection Time: 11/22/24  4:13 AM   Result Value Ref Range    Magnesium 1.4 (L) 1.8 - 2.4 mg/dL   Potassium    Collection Time: 11/22/24  4:14 AM   Result Value Ref Range    Potassium 3.4 (L) 3.5 - 5.1 mmol/L       No results for

## 2024-11-22 NOTE — CARE COORDINATION
CM reached out to [facility] today and they received approval to [facility]. Tony with [facility] will do bedside teaching today between 1 and 2 pm. Patient will then be clear for DC home with [facility] to start services. CM informed [facility] rep of DC today. Family denies additional needs. Family will transport home.        11/22/24 9629   Service Assessment   Patient Orientation  --    Cognition  --    History Provided By  --    Primary Caregiver  --    Accompanied By/Relationship daughter   Services At/After Discharge   Transition of Care Consult (CM Consult) Home Health;Other  ([facility] for infusion)   Internal Home Health No   Reason Outside Agency Chosen Unable to staff case   Services At/After Discharge Home Health;IV Therapy    Resource Information Provided? No   Mode of Transport at Discharge Other (see comment)  (private vehicle)   Confirm Follow Up Transport Other (see comment)  (private vehicle)   Condition of Participation: Discharge Planning   The Plan for Transition of Care is related to the following treatment goals: Discharging home with support of family, HH with [facility] and infusion services with [facility]   The Patient and/or Patient Representative was provided with a Choice of Provider? Patient   The Patient and/Or Patient Representative agree with the Discharge Plan? Yes   Freedom of Choice list was provided with basic dialogue that supports the patient's individualized plan of care/goals, treatment preferences, and shares the quality data associated with the providers?  Yes

## 2024-11-26 NOTE — PROGRESS NOTES
Physician Progress Note      PATIENT:               DAVID LERMA  CSN #:                  [id number]  :                       1945  ADMIT DATE:       2024 10:03 AM  DISCH DATE:        2024 2:50 PM  RESPONDING  PROVIDER #:        Christopher Saucedo MD          QUERY TEXT:    Patient admitted with CVA. Noted to have Moderate malnutrition in Dietitian PN   . If possible, please document in progress notes and discharge summary   if you are evaluating and /or treating any of the following:    The medical record reflects the following:  Risk Factors: Bronchogenic cancer of right lung, 79 year old    Clinical Indicators: Dietitian PN  \"Malnutrition Status: Moderate   malnutrition  Context: Chronic Illness  Findings of clinical characteristics of malnutrition:  Energy Intake:  Mild decrease in energy intake (supplementation via oral   supplement has contributed caloric and protein density)  Weight Loss:  Greater than 7.5% over 3 months (14% since September)  Body Fat Loss:  No body fat loss ()  Muscle Mass Loss:  Mild muscle mass loss () Clavicles (pectoralis &   deltoids), Temples (temporalis)\".    BMI: 22.6,    Treatment: Dietitian consult, Continue Enlive, add Magic cup, Diet Continue   current order    Thank you  SYLVIA Singh CDS    ASPEN Criteria:    https://aspenjournals.onlinelibrary.verde.com/doi/full/10.1177/306320919453540  5  Options provided:  -- Protein calorie malnutrition moderate  -- Other - I will add my own diagnosis  -- Disagree - Not applicable / Not valid  -- Disagree - Clinically unable to determine / Unknown  -- Refer to Clinical Documentation Reviewer    PROVIDER RESPONSE TEXT:    This patient has moderate protein calorie malnutrition.    Query created by: Param Galvan on 2024 2:56 AM      Electronically signed by:  Christopher Saucedo MD 2024 10:22 AM

## 2024-12-02 ENCOUNTER — OFFICE VISIT (OUTPATIENT)
Dept: NEUROLOGY | Age: 79
End: 2024-12-02
Payer: MEDICARE

## 2024-12-02 VITALS
BODY MASS INDEX: 22.66 KG/M2 | HEART RATE: 83 BPM | WEIGHT: 153 LBS | DIASTOLIC BLOOD PRESSURE: 68 MMHG | OXYGEN SATURATION: 98 % | HEIGHT: 69 IN | SYSTOLIC BLOOD PRESSURE: 126 MMHG

## 2024-12-02 DIAGNOSIS — I69.319 COGNITIVE DEFICIT AS LATE EFFECT OF CEREBROVASCULAR ACCIDENT (CVA): ICD-10-CM

## 2024-12-02 DIAGNOSIS — Z09 HOSPITAL DISCHARGE FOLLOW-UP: Primary | ICD-10-CM

## 2024-12-02 DIAGNOSIS — I63.9 RECURRENT STROKES (HCC): ICD-10-CM

## 2024-12-02 DIAGNOSIS — I33.0 INFECTIVE ENDOCARDITIS OF AORTIC VALVE: ICD-10-CM

## 2024-12-02 DIAGNOSIS — Z87.440 RECENT URINARY TRACT INFECTION: ICD-10-CM

## 2024-12-02 DIAGNOSIS — C34.90 MALIGNANT NEOPLASM OF LUNG METASTATIC TO BRAIN (HCC): ICD-10-CM

## 2024-12-02 DIAGNOSIS — C79.31 MALIGNANT NEOPLASM OF LUNG METASTATIC TO BRAIN (HCC): ICD-10-CM

## 2024-12-02 PROCEDURE — 1111F DSCHRG MED/CURRENT MED MERGE: CPT | Performed by: NURSE PRACTITIONER

## 2024-12-02 PROCEDURE — 1159F MED LIST DOCD IN RCRD: CPT | Performed by: NURSE PRACTITIONER

## 2024-12-02 PROCEDURE — 1126F AMNT PAIN NOTED NONE PRSNT: CPT | Performed by: NURSE PRACTITIONER

## 2024-12-02 PROCEDURE — 1160F RVW MEDS BY RX/DR IN RCRD: CPT | Performed by: NURSE PRACTITIONER

## 2024-12-02 PROCEDURE — 3078F DIAST BP <80 MM HG: CPT | Performed by: NURSE PRACTITIONER

## 2024-12-02 PROCEDURE — 99215 OFFICE O/P EST HI 40 MIN: CPT | Performed by: NURSE PRACTITIONER

## 2024-12-02 PROCEDURE — G8427 DOCREV CUR MEDS BY ELIG CLIN: HCPCS | Performed by: NURSE PRACTITIONER

## 2024-12-02 PROCEDURE — 1036F TOBACCO NON-USER: CPT | Performed by: NURSE PRACTITIONER

## 2024-12-02 PROCEDURE — 3074F SYST BP LT 130 MM HG: CPT | Performed by: NURSE PRACTITIONER

## 2024-12-02 PROCEDURE — G8420 CALC BMI NORM PARAMETERS: HCPCS | Performed by: NURSE PRACTITIONER

## 2024-12-02 PROCEDURE — G8482 FLU IMMUNIZE ORDER/ADMIN: HCPCS | Performed by: NURSE PRACTITIONER

## 2024-12-02 PROCEDURE — 1123F ACP DISCUSS/DSCN MKR DOCD: CPT | Performed by: NURSE PRACTITIONER

## 2024-12-02 RX ORDER — OXYCODONE HYDROCHLORIDE 5 MG/1
5 TABLET ORAL EVERY 6 HOURS PRN
COMMUNITY
Start: 2024-10-30

## 2024-12-02 ASSESSMENT — PATIENT HEALTH QUESTIONNAIRE - PHQ9
1. LITTLE INTEREST OR PLEASURE IN DOING THINGS: NOT AT ALL
SUM OF ALL RESPONSES TO PHQ QUESTIONS 1-9: 0
SUM OF ALL RESPONSES TO PHQ QUESTIONS 1-9: 0
SUM OF ALL RESPONSES TO PHQ9 QUESTIONS 1 & 2: 0
SUM OF ALL RESPONSES TO PHQ QUESTIONS 1-9: 0
2. FEELING DOWN, DEPRESSED OR HOPELESS: NOT AT ALL
SUM OF ALL RESPONSES TO PHQ QUESTIONS 1-9: 0

## 2024-12-11 PROBLEM — J18.9 HCAP (HEALTHCARE-ASSOCIATED PNEUMONIA): Status: ACTIVE | Noted: 2024-12-11

## 2024-12-11 NOTE — ED TRIAGE NOTES
Pt to room 4 via wheelchair, granddaughter by side. PT presents for SOB. Denies chest pain. Hx COPD. PT has lung cancer. Granddaughter reports hx of sob, but has gotten worse over the past week. PT reports cough for about 1.5months. PT currently has penicillin going through Left arm for an infection in heart valve per granddaughter.

## 2024-12-11 NOTE — ED PROVIDER NOTES
[employer] Emergency Department Provider Note                   PCP:                Unknown, Provider, MD               Age: 79 y.o.      Sex: male     MEDICAL DECISION MAKING  Complexity of Problems Addressed:   1 or more acute illness/injury that poses a threat to life or bodily function    Data Reviewed and Analyzed:  Category 1:    I have reviewed outside records from an external source for any pertinent PMH, ED visits, primary care visits, specialist visits, labs, EKG, and/or radiologic studies.    Category 2:     ED EKG Interpretation  EKG was interpreted in the absence of a cardiologist.    Rate: Rate: Normal  EKG Interpretation: EKG Interpretation: sinus rhythm with premature ventricular contractions  ST Segments: Nonspecific ST segments - NO STEMI      I independently ordered and reviewed the labs.    I reviewed patient's chest x-ray and he has bilateral infiltrates, worse in the right upper.  This is much different than his chest x-ray 5 weeks ago.        The patients assessment required an independent historian: Patient's family.  The reason they were needed is important historical information not provided by the patient.    The patient was admitted and I have discussed patient management with the admitting provider.    Category 3:     Discussion of management or test interpretation:    MDM  Number of Diagnoses or Management Options  Pneumonia of both lungs due to infectious organism, unspecified part of lung  Diagnosis management comments: Patient with a history of COPD and lung cancer presents for shortness of breath.  Patient was recently hospitalized for a stroke and found to have infectious endocarditis.  Patient was not in respiratory distress and had normal pulse ox and lung sounds.  His chest x-ray shows he has bilateral infiltrates which are worse in the right upper.  His white count, procalcitonin, and lactic acid are all normal and no indication of sepsis.  His troponin is mildly elevated but

## 2024-12-11 NOTE — ED NOTES
Pt states that he has had a cough for several weeks and has been short of breath.  Hx of COPD and lung cancer.  States that just feels like he is haivng a difficult time taking a deep breath

## 2024-12-12 PROBLEM — Z66 DNR (DO NOT RESUSCITATE): Status: ACTIVE | Noted: 2024-12-12

## 2024-12-12 PROBLEM — G93.41 METABOLIC ENCEPHALOPATHY: Status: ACTIVE | Noted: 2024-01-01

## 2024-12-12 PROBLEM — E87.6 HYPOKALEMIA: Status: ACTIVE | Noted: 2024-01-01

## 2024-12-12 PROBLEM — J18.9 HCAP (HEALTHCARE-ASSOCIATED PNEUMONIA): Status: RESOLVED | Noted: 2024-01-01 | Resolved: 2024-01-01

## 2024-12-12 PROBLEM — R07.9 CHEST PAIN: Status: ACTIVE | Noted: 2024-12-12

## 2024-12-12 PROBLEM — J44.1 COPD EXACERBATION (HCC): Status: ACTIVE | Noted: 2024-12-12

## 2024-12-12 NOTE — H&P
Hospitalist History and Physical   Admit Date:  2024 10:37 PM   Name:  Jeronimo Ortiz .   Age:  79 y.o.  Sex:  male  :  1945   MRN:  [id number]   Room:  [place] [address]    Presenting/Chief Complaint: No chief complaint on file.     Reason(s) for Admission: HCAP (healthcare-associated pneumonia) [J18.9]     History of Present Illness:     79 years old gentleman with history of COPD, lung cancer, history of recent stroke secondary to infective endocarditis, currently getting ceftriaxone, 20,000,000 units penicillin daily continuously for Enterococcus faecalis infective endocarditis with ceftriaxone presented to emergency room with worsening shortness of breath, cough, wheezing.  Patient reports tried using inhalers without any improvement.  No history of fever, chills, runny nose, sore throat.  Patient currently not on supplemental oxygen, oxygen saturations dropped up to 90s.  Patient was evaluated in ER, chest x-ray shows bilateral infiltrates.  Patient was seen and freestanding ER and transferred to the [facility] [place] for further evaluation.          Assessment & Plan:     Healthcare associate pneumonia: Initiated on vancomycin, Zosyn, will hold current ceftriaxone and penicillin until patient seen by ID to decide further course of antibiotics.    Infective endocarditis: Please see above.    Acute COPD exacerbation: Initiated on bronchodilator therapy, steroids, currently on antibiotics.    Dyslipidemia: Continue on statins.    GERD: Continue on Prilosec.    Patient is oriented x 2-3 at this time, discussed about CODE STATUS, patient would like to discuss with his daughter.  Currently patient daughter is not available.  During last admission patient was changed to DNR but currently patient slightly confused, once patient daughter available will confirm with patient daughter.  Until then we will leave as full code.      Diet: ADULT DIET; Regular  VTE prophylaxis: SCD's   Code status: Full

## 2024-12-12 NOTE — CARE COORDINATION
Pt presented to the ED with worsening SOB, cough and wheezing. Admitted for HCAP. Medical hx includes: COPD, lung CA, h/o recent stroke secondary to infective endocarditis, currently getting ceftriaxone and 20,000,000 units penicillin daily continuously for enterococcus faecalis infective endocarditis with ceftriaxone. Pt is a readmit, was discharged on 11/22/24. Readmission Assessment completed. PTA, pt moderately indep with his ADLs. Lives with his dtr in a mobile home with ramped entrance. On 3L supplemental, denies home oxygen use. Uses a rolator for ambulation assistance, denies recent h/o falls. Is current with [facility] for RN/PT, EDIN sent. He is also receiving home IV abx with [facility]. Writer messaged ID and are aware that CM needs an updated abx order for [facility]; Tony following. [facility] Notification ID: [id number]. Insured with [employer]. Will continue to monitor.       12/12/24 2077   Service Assessment   Patient Orientation Alert and Oriented;Person;Place;Situation;Self   Cognition Alert   History Provided By Patient   Primary Caregiver Self   Support Systems Children;Family Members;[employer]/[place] [employer];Friends/Neighbors   Patient's Healthcare Decision Maker is: Legal Next of Kin   PCP Verified by CM Yes  [facility])   Last Visit to PCP Within last 6 months   Prior Functional Level Assistance with the following:;Mobility   Current Functional Level Assistance with the following:;Mobility   Can patient return to prior living arrangement Yes   Ability to make needs known: Good   Family able to assist with home care needs: Yes   Would you like for me to discuss the discharge plan with any other family members/significant others, and if so, who? No   Financial Resources [place] ([facility])   Community Resources ECF/Home Care   Social/Functional History   Lives With Daughter   Type of Home Mobile home   Home Layout One level   Home Access Ramped entrance   Bathroom

## 2024-12-12 NOTE — PLAN OF CARE
Problem: Chronic Conditions and Co-morbidities  Goal: Patient's chronic conditions and co-morbidity symptoms are monitored and maintained or improved  Outcome: Progressing  Flowsheets (Taken 12/11/2024 2257)  Care Plan - Patient's Chronic Conditions and Co-Morbidity Symptoms are Monitored and Maintained or Improved: Monitor and assess patient's chronic conditions and comorbid symptoms for stability, deterioration, or improvement     Problem: Discharge Planning  Goal: Discharge to home or other facility with appropriate resources  Outcome: Progressing  Flowsheets  Taken 12/11/2024 2303  Discharge to home or other facility with appropriate resources:   Identify barriers to discharge with patient and caregiver   Identify discharge learning needs (meds, wound care, etc)  Taken 12/11/2024 2257  Discharge to home or other facility with appropriate resources:   Identify barriers to discharge with patient and caregiver   Identify discharge learning needs (meds, wound care, etc)     Problem: Safety - Adult  Goal: Free from fall injury  Outcome: Progressing  Flowsheets (Taken 12/11/2024 2257)  Free From Fall Injury: Instruct family/caregiver on patient safety

## 2024-12-12 NOTE — PLAN OF CARE
Problem: Chronic Conditions and Co-morbidities  Goal: Patient's chronic conditions and co-morbidity symptoms are monitored and maintained or improved  12/12/2024 0950 by Paddy Ojeda RN  Outcome: Progressing  12/12/2024 0107 by Linda Tobias RN  Outcome: Progressing  Flowsheets (Taken 12/11/2024 2257)  Care Plan - Patient's Chronic Conditions and Co-Morbidity Symptoms are Monitored and Maintained or Improved: Monitor and assess patient's chronic conditions and comorbid symptoms for stability, deterioration, or improvement     Problem: Discharge Planning  Goal: Discharge to home or other facility with appropriate resources  12/12/2024 0950 by Paddy Ojeda RN  Outcome: Progressing  12/12/2024 0107 by Linda Tobias RN  Outcome: Progressing  Flowsheets  Taken 12/11/2024 2303  Discharge to home or other facility with appropriate resources:   Identify barriers to discharge with patient and caregiver   Identify discharge learning needs (meds, wound care, etc)  Taken 12/11/2024 2257  Discharge to home or other facility with appropriate resources:   Identify barriers to discharge with patient and caregiver   Identify discharge learning needs (meds, wound care, etc)     Problem: Safety - Adult  Goal: Free from fall injury  12/12/2024 0950 by Paddy Ojeda RN  Outcome: Progressing  12/12/2024 0107 by Linda Tobias RN  Outcome: Progressing  Flowsheets (Taken 12/11/2024 2257)  Free From Fall Injury: Instruct family/caregiver on patient safety

## 2024-12-12 NOTE — ED NOTES
Attempted to call daughter to give information about room placement. Message was left to call this RN back.

## 2024-12-12 NOTE — ED NOTES
TRANSFER - OUT REPORT:    Verbal report given to Leticia WHITAKER on Jeronimo Moore Angel Sr.  [facility] routine progression of patient care       Report consisted of patient's Situation, Background, Assessment and   Recommendations(SBAR).     Information from the following report(s) ED SBAR was reviewed with the receiving nurse.    Lines:   PICC 11/21/24 Left Brachial (Active)       Peripheral IV 12/11/24 Right Antecubital (Active)   Site Assessment Clean, dry & intact 12/11/24 1746   Line Status Blood return noted 12/11/24 1746   Phlebitis Assessment No symptoms 12/11/24 1746   Infiltration Assessment 0 12/11/24 1746   Dressing Status Clean, dry & intact 12/11/24 1746   Dressing Type Transparent 12/11/24 1746        Opportunity for questions and clarification was provided.      Patient transported with:  O2 @ 3lpm

## 2024-12-13 NOTE — ICUWATCH
Rapid Response Team Note      Subjective: Responded to Rapid Response secondary to increased O2 needs and SOB.    Summary: Pt sitting on side of bed on NRB with O2 sat 97%. Pt c/o SOB, chest pain, and difficulty breathing. RR 30's and labored. pO2 57 on ABG. Plan was to place pt on BiPap for work of breathing but pt began coughing up blood and vomiting so he tx to ICU for AirVo. CXR, EKG, and STAT labs ordered. DIS 67.      See Rapid Response/Code Blue Narrator for full documentation    Outcome: Patient transferring to critical care.      Lucille Rodriguez RN  [facility]: [phone]  [facility]: [phone]

## 2024-12-13 NOTE — INTERDISCIPLINARY ROUNDS
Multi-D Rounds/Checklist (leapfrog):  Lines: can any be removed?: None      DVT Prophylaxis: Contraindicated  Vent: N/A  Nutrition Ordered/appropriate: Per Primary Team  Can antibiotics or other drugs be stopped? Yes/End Date set No  MRSA swab:   Inpat Anti-Infectives (From admission, onward)       Start     Ordered Stop    12/12/24 1400  penicillin G potassium 20 Million Units in sodium chloride 0.9 % 1,000 mL infusion  20 Million Units,   IntraVENous,   EVERY 24 HOURS         12/12/24 1236 --    12/12/24 1300  cefTRIAXone (ROCEPHIN) 2,000 mg in sodium chloride 0.9 % 50 mL IVPB (mini-bag)  2,000 mg,   IntraVENous,   EVERY 12 HOURS         12/12/24 1236 --                  Consults needed:  palliative care  A: Is pain control adequate? (has PRNs? Stop drip?) Yes  B: Sedation break and SBT? N/A  C: Is sedation choice appropriate? N/A  D: Delirium/CAM-ICU? No  E: Mobility goals/appropriateness? N/A  F: Family update and plan? daughter is surrogate decision maker and is being updated daily by primary attending and nursing staff.    Keira Gold, APRN - CNP

## 2024-12-13 NOTE — PLAN OF CARE
Problem: Chronic Conditions and Co-morbidities  Goal: Patient's chronic conditions and co-morbidity symptoms are monitored and maintained or improved  12/12/2024 2235 by Joaquin Styles RN  Outcome: Progressing  12/12/2024 0950 by Paddy Ojeda RN  Outcome: Progressing     Problem: Discharge Planning  Goal: Discharge to home or other facility with appropriate resources  12/12/2024 2235 by Joaquin Styles RN  Outcome: Progressing  12/12/2024 0950 by Paddy Ojeda RN  Outcome: Progressing     Problem: Safety - Adult  Goal: Free from fall injury  12/12/2024 2235 by Joaquin Styles RN  Outcome: Progressing  12/12/2024 0950 by Paddy Ojeda RN  Outcome: Progressing     Problem: Skin/Tissue Integrity  Goal: Absence of new skin breakdown  Description: 1.  Monitor for areas of redness and/or skin breakdown  2.  Assess vascular access sites hourly  3.  Every 4-6 hours minimum:  Change oxygen saturation probe site  4.  Every 4-6 hours:  If on nasal continuous positive airway pressure, respiratory therapy assess nares and determine need for appliance change or resting period.  Outcome: Progressing

## 2024-12-13 NOTE — CONSULTS
Infectious Disease Consult      Today's Date: 12/12/2024   Admit Date: 12/11/2024  YOB: 1945    Impression:   RUL and bibasilar infiltrates per chest x-ray 12/11/24    ? Fluid in setting of getting additional fluid with continuous infusion abx therapy for AVIE.   BNP 6,875.    Patient reports sputum production one week ago that was thick/white blood tinged but this has overall improved now.  BLE has improved after lasix per patient report.    E.faecalis bacteremia with AVIE  BCXs 11/13/24 positive.  Repeat BCXs 11/15/24 negative.  BCXs 12/11/24 pending.    GYPSY 11/18/24 with AV with mobile echodensities involving the ventricular aspect of the noncoronary cusp/right coronary cusp suggestive of vegetations.  Urine culture 11/13/24 + E.faecalis   Patient had been on Penicillin 20 MU continuous infusion and Ceftriaxone 2 grams Q 12 hours PTA with pending EOT 12/27/24.       Bronchogenic cancer of right lung, scheduled for lung surgery with PH which is currently on hold.    COPD  MRI brain 11/15/24 with foci concerning for mets, infarcts, bleeding, and fusiform aneurysm.     Plan:   Stop Vancomycin and Zosyn.  Restart Ceftriaxone 2 grams Q 12 hours and Penicillin G 20 MU continuous infusion   Recheck TTE to re-evaluate valves given AVIE.  Note previous GYPSY has suggestion of likely leaflet perforation/leaflet prolapse with severe regurgitation.    Follow blood cultures   I did coordinate care with ID Pharm to inquire about Na content with PCN vs Ampicillin.  Per discussion, no medically significant amounts of Na contained in PCN or Ampicillin or difference of Na levels between PCN vs Ampicillin  ID following     Anti-infectives:   Vancomycin 12/12/24  Zosyn 12/12/24  Levaquin 12/11/24  Penicillin 20 MU CI 11/21/24-12/11/24   Ceftriaxone 2 grams Q 12 11/13/24-12/11/24, 12/12/24-  Ampicillin 11/13/24-11/21/24, 12/12/24-    Subjective:   Date of Consultation:  December 12, 2024  Date of Admission: 12/11/2024 
[facility] Cardiology Initial Cardiac Evaluation                 Date of  Admission: 12/11/2024 10:37 PM     Primary Care Physician: Unknown, Provider, MD  Primary Cardiologist: None  Referring Physician: Dr Rey  Attending Physician: Dr Carlisle    CC: DEDE      Jeronimo Ortiz Sr. is a 79 y.o. male admitted for HCAP (healthcare-associated pneumonia) [J18.9].  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 CNS metastasis. Palliative care consult, DNR, considering hospice. Presented to the [facility] ER 12/11/24 w SOB, gradually worse, cough 1 1/2 months.     He reports sudden onset this evening of a line of pain below his umbilicus, which he calls his chest, later reporting it was a few cm above and below his umbilicus, but denies any pain in the chest region.  Pain was sharp, without nausea, diaphoresis, unchanged w exertion, did not radiate, resolved w nitro.  His SOB is slightly improved, reports profuse cough and hemoptysis.     In ER K 3.4, cr .58, pBNP 6388, HS trop 68 and 64 and 71, albumin 2.1, WBC 9, hgb 11, platelets 388, CXR B infiltrates, EKG NSR w rate 94 w NSST/T wave changes, /38. Transferred downtown, started on antibiotics for PNA and COPD exacerbation.  Seen by ID. Repeat echo pending.   Today CP, HS trop 71, pBNP 6300, repeat EKG NSR w rate 94 without signficant St changes. BP 98/87. No continued chest or abdominal pain. Repeat EKG this evening without significant ST changes.  Discussed w nursing, was pointing to his chest earlier w sudden onset sharp pain.          Patient Active Problem List   Diagnosis    Left arm weakness    Cellulitis of helix of right ear    Hypertension    Hyperlipidemia    COPD (chronic obstructive pulmonary disease) (HCC)    Skin cancer    GERD (gastroesophageal reflux 
turgor normal. No rash or lesions.   Neurologic: Nonfocal   Psych: Alert and oriented       Signed By: Donnell Borrero MD     December 13, 2024

## 2024-12-13 NOTE — PLAN OF CARE
Problem: Chronic Conditions and Co-morbidities  Goal: Patient's chronic conditions and co-morbidity symptoms are monitored and maintained or improved  12/13/2024 0950 by Josephine Nagel RN  Outcome: Progressing  12/12/2024 2235 by Joaquin Styles RN  Outcome: Progressing     Problem: Discharge Planning  Goal: Discharge to home or other facility with appropriate resources  12/13/2024 0950 by Josephine Nagel RN  Outcome: Progressing  12/12/2024 2235 by Joaquin Styles RN  Outcome: Progressing     Problem: Safety - Adult  Goal: Free from fall injury  12/13/2024 0950 by Josephine Nagel RN  Outcome: Progressing  12/12/2024 2235 by Joaquin Styles RN  Outcome: Progressing     Problem: Skin/Tissue Integrity  Goal: Absence of new skin breakdown  Description: 1.  Monitor for areas of redness and/or skin breakdown  2.  Assess vascular access sites hourly  3.  Every 4-6 hours minimum:  Change oxygen saturation probe site  4.  Every 4-6 hours:  If on nasal continuous positive airway pressure, respiratory therapy assess nares and determine need for appliance change or resting period.  12/13/2024 0950 by Josephine Nagel RN  Outcome: Progressing  12/12/2024 2235 by Joaquin Styles RN  Outcome: Progressing     Problem: Respiratory - Adult  Goal: Achieves optimal ventilation and oxygenation  12/13/2024 0950 by Josephine Nagel RN  Outcome: Progressing  12/13/2024 0937 by Barbara Chauhan RCP  Outcome: Progressing     Problem: Pain  Goal: Verbalizes/displays adequate comfort level or baseline comfort level  Outcome: Progressing

## 2024-12-13 NOTE — ACP (ADVANCE CARE PLANNING)
Advance Care Planning Note   Admit Date:  2024 10:37 PM   Name:  Jeronimo Ortiz Sr.   Age:  79 y.o.  Sex:  male  :  1945   MRN:  [id number]   Room:      Jeronimo Ortiz Sr. has capacity to make his own decisions:   Yes    Patient noted to have acute change in respiratory status overnight and was struggling to breathe while on max settings with BiPAP this morning.  Given patient's multiple comorbidities including lung cancer with metastases to the brain, infective endocarditis and pulmonary edema discussion was had about steps going forward.  Patient's family also updated and present during discussion.  Given patient's significant shortness of breath there was discussion about possibility of hospice with some has been brought up in the past to patient given his underlying diagnosis of cancer.  Ultimately after discussion with patient plan is for comfort measures at this time and will monitor in the hospital for the next 24 hours as patient is very likely to pass away given current condition.    Patient or surrogate consented to discussion of the current conditions, workup, management plans, prognosis, and the risk for further deterioration.  Time spent: 16 minutes in direct discussion.      Signed:  Len Guzman MD

## 2024-12-16 NOTE — PROGRESS NOTES
Hospitalist Progress Note   Admit Date:  2024 10:37 PM   Name:  Jeronimo Ortiz Sr.   Age:  79 y.o.  Sex:  male  :  1945   MRN:  [id number]   Room:  [place] [address]    Presenting/Chief Complaint: No chief complaint on file.     Reason(s) for Admission: HCAP (healthcare-associated pneumonia) [J18.9]     Hospital Course:   Jeronimo Ortiz Sr. is a 79 y.o. 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  Currently treating for COPD exacerbation in the setting of HCAP.    Subjective & 24hr Events:   No acute overnight events.    Mild improvement in SOB and wheezing.  No fever or chills.      Assessment & Plan:       Acute COPD exacerbation  -In the setting of suspected CHF exacerbation  -Continue steroids, DuoNeb  -Treat underlying cause      Suspected CHF exacerbation  Infectious endocarditis, with Enterococcus faecalis  -CXR showed bilateral infiltrates  -Initially concerned for HCAP.  However, ID suspecting CHF exacerbation in the setting of AV regurgitation due to AV infectious endocarditis  -Initially started vancomycin and Zosyn.  -ID recommending to go back to ceftriaxone and penicillin.  -Echocardiogram ordered by ID  -Appreciate ID  -Will order Lasix to see if that improves breathing      History of CVA  -Continue aspirin and high intensity statin      Lung cancer with metastasis to brain  Chronic metabolic encephalopathy  -AxO x2.5 (not to year)  -Continue to monitor clinically  -Delirium precaution      Hypokalemia  -Replete per protocol      Anticipated Discharge Arrangements:   Therapy has not evaluated yet    PT/OT evals ordered?  Therapy evals ordered  Diet:  ADULT DIET; Regular  VTE prophylaxis: Lovenox  Code 
       Hospitalist Progress Note   Admit Date:  2024 10:37 PM   Name:  Jeronimo Ortiz Sr.   Age:  79 y.o.  Sex:  male  :  1945   MRN:  [id number]   Room:  821/01    Presenting/Chief Complaint: No chief complaint on file.     Reason(s) for Admission: HCAP (healthcare-associated pneumonia) [J18.9]     Hospital Course:   Jeronimo Ortiz Sr. is a 79 y.o. 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 Admitted for COPD exacerbation in the setting of HCAP. Hospital course complicated by worsening respiratory failure and ICU stay. Discussion with family was held to pursue comfort care measures only and patient was transferred out of ICU .       Subjective & 24hr Events:   Overnight patient agitated and ma placed with improvement in agitation. On my evaluation patient in no distress this morning, no increased work of breathing. Surrounded by friends and family. All questions answered.       Assessment & Plan:     Comfort care measures  -made DNR / comfort care  -as needed medications for respiratory distress or anxiety ordered  -ma placed for comfort   -family updated at bedside       COPD exacerbation (HCC)  -inhalers PRN for comfort only  -nasal cannula O2 for comfort       History of cerebrovascular accident  -noted  -comfort measures only       Infective endocarditis  -noted  -stopped antibiotics as comfort care only       DNR (do not resuscitate)  -noted        Anticipated Discharge Arrangements:   Hospice Facility     PT/OT evals ordered?  Not ordered; patient not expected to need rehab  Diet:  ADULT DIET; Regular Comfort feeding.   VTE prophylaxis: No VTE prophylaxis needed  Code status: DNR      Non-peripheral 
       Hospitalist Progress Note   Admit Date:  2024 10:37 PM   Name:  Jeronimo Ortiz Sr.   Age:  79 y.o.  Sex:  male  :  1945   MRN:  [id number]   Room:  821/01    Presenting/Chief Complaint: No chief complaint on file.     Reason(s) for Admission: HCAP (healthcare-associated pneumonia) [J18.9]     Hospital Course:   Jeronimo Ortiz Sr. is a 79 y.o. 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 Admitted for COPD exacerbation in the setting of HCAP. Hospital course complicated by worsening respiratory failure and ICU stay. Discussion with family was held to pursue comfort care measures only and patient was transferred out of ICU .       Subjective & 24hr Events:   No overnight events reported. Patient in no apparent distress this morning. Family updated at bedside. Working on hospice house.       Assessment & Plan:     Comfort care measures  -made DNR / comfort care  -as needed medications for respiratory distress or anxiety ordered  -ma placed for comfort   -family updated at bedside       COPD exacerbation (HCC)  -inhalers PRN for comfort only  -nasal cannula O2 for comfort       History of cerebrovascular accident  -noted  -comfort measures only       Infective endocarditis  -noted  -stopped antibiotics as comfort care only       DNR (do not resuscitate)  -noted        Anticipated Discharge Arrangements:   Hospice Facility     PT/OT evals ordered?  Not ordered; patient not expected to need rehab  Diet:  ADULT DIET; Regular Comfort feeding.   VTE prophylaxis: No VTE prophylaxis needed  Code status: DNR      Non-peripheral Lines and Tubes (if present):      Urinary Catheter 12/15/24 Ma (Active)     PICC 24 
   12/13/24 0508   NIV Type   $NIV $Daily Charge   NIV Started/Stopped On   Equipment Type v60   Mode Bilevel   Mask Type Full face mask   Mask Size Large   Assessment   Respirations 24   SpO2 99 %   Comfort Level Good   Using Accessory Muscles Yes   Mask Compliance Good   Skin Assessment Clean, dry, & intact   Settings/Measurements   PIP Observed 16 cm H20   IPAP 16 cmH20   CPAP/EPAP 10 cmH2O   Vt (Measured) 1077 mL   Rate Ordered 18   Insp Rise Time (%) 3 %   FiO2  100 %  (NIV)   I Time/ I Time % 0.9 s   Minute Volume (L/min) 25.5 Liters   Mask Leak (lpm) 0 lpm   Patient's Home Machine No   Alarm Settings   Alarms On Y   Low Pressure (cmH2O) 5 cmH2O   High Pressure (cmH2O) 50 cmH2O   Apnea (secs) 20 secs   RR Low (bpm) 8   RR High (bpm) 50 br/min       
 visited patient on comfort care and family at bedside.  Patient was alert, but hard of hearing, having trouble conversing.  Family was supportive and requested prayer on patients behalf.   provided pastoral presence, prayer and empathetic listening.  Peace be with you,  Signed by  AVA ThurstonDiv.   [phone]  
4 Eyes Skin Assessment     NAME:  Jeronimo Ortiz .  YOB: 1945  MEDICAL RECORD NUMBER:  [id number]    The patient is being assessed for  Transfer to New Unit    I agree that at least one RN has performed a thorough Head to Toe Skin Assessment on the patient. ALL assessment sites listed below have been assessed.      Areas assessed by both nurses:    Head, Face, Ears, Shoulders, Back, Chest, Arms, Elbows, Hands, Sacrum. Buttock, Coccyx, Ischium, and Legs. Feet and Heels        Does the Patient have a Wound? Yes wound(s) were present on assessment. LDA wound assessment was Initiated and completed by RN       [place] Prevention initiated by RN: Yes  Wound Care Orders initiated by RN: No    Pressure Injury (Stage 3,4, Unstageable, DTI, NWPT, and Complex wounds) if present, place Wound referral order by RN under : Yes    New Ostomies, if present place, Ostomy referral order under : No     Nurse 1 eSignature: Electronically signed by Donna Engel RN on 12/13/24 at 4:56 PM EST    **SHARE this note so that the co-signing nurse can place an eSignature**    Nurse 2 eSignature: Electronically signed by Ava Light RN on 12/13/24 at 5:06 PM EST   
Called to room by patient. Patient tachypneic, diaphoretic, hypertensive, c/o chest pain. Rapid response called. New orders per Hospitalist, Dr. YON Chávez, see eMAR. New orders noted.   
Comfort care status noted. ID will sign off. Thank you.   
Family called this RN to room stating that the patient is agitated and trying to get out of bed. Family requested ativan for patient. This RN was retrieving ativan when family members came to nurses station requesting additional assistance to room because patient actively trying to get out of bed because patient needed to use the bathroom. Charge RN and another RN helped patient use the urinal while this RN prepared ativan 1mg IV. Family irritated stating \"this is why we wanted the medication when it was due. The PRN is nonsense. We were told he would be sedated.\" This RN explained to family that the medication given to the patient was not for the purpose of sedation, but for comfort. Ativan 1 mg IV administered. Patient in bed eyes closed, does not appear to be in distress.   
Infectious Disease Progress Note      Today's Date: 12/13/2024   Admit Date: 12/11/2024  YOB: 1945    Impression:   RUL and bibasilar infiltrates per chest x-ray 12/11/24    ? Fluid in setting of getting additional fluid with continuous infusion abx therapy for AVIE.  BNP 6,875.    Patient reports sputum production one week ago that was thick/white blood tinged but this has overall improved now.  BLE has improved after lasix per patient report.    No urine output recorded. CXR today appears to show increased volume/edema and small PTX  R small PTX on CXR today  Oxygen requirements increased  E.faecalis bacteremia with AVIE  BCXs 11/13/24 positive.  Repeat BCXs 11/15/24 negative.  BCXs 12/11/24 pending.    GYPSY 11/18/24 with AV with mobile echodensities involving the ventricular aspect of the noncoronary cusp/right coronary cusp suggestive of vegetations.  Urine culture 11/13/24 + E.faecalis   Patient had been on Penicillin 20 MU continuous infusion and Ceftriaxone 2 grams Q 12 hours PTA with pending EOT 12/27/24.  Repeat blood cx 12/11 NGTD       Bronchogenic cancer of right lung, scheduled for lung surgery with PH which is currently on hold.    COPD  MRI brain 11/15/24 with foci concerning for mets, infarcts, bleeding, and fusiform aneurysm.     Plan:   Recommend diuresis for comfort/treatment  Ongoing discussions for comfort care: agree. He is not a surgical candidate per last hospitalization notes.   Repeat TTE if family wish; may help with decision making.   Continue Ceftriaxone 2 grams Q 12 hours and Penicillin G 20 MU continuous infusion unless a decision made to become comfort care. This represents best antibiotic treatment for his endocarditis.  Overall doubt has acquired another typical infection while on current therapy. Atypical bacterial infection could be possible but overall I think unlikely.    Follow repeat blood cultures   ID following     Anti-infectives:   Vancomycin 12/12/24  Zosyn 
Patient complaining of severe chest pain.   Provider was notified at 1851 and orders were given.  Provider arrived at bedside.  2 tablets of nitro were given.  EKG obtained and Troponin ordered.  Patient's pain has relieved for now.  Patient is hypotensive likely secondary to the nitroglycerin.  Monitoring.    
Patient family requesting ativan for agitation. This RN entered room to administer ativan 1mg IV to find patient with eyes closed, respirations shallow and labored, did not appear to be in distress. Family adamant about administering ativan because the morphine \"did not work because he was still awake.\" This RN provided education about the purpose of morphine and ativan. Family verbalized understanding. This RN proceeded to give ativan for comfort measures at the families request. Bed low and locked, call light within reach. Informed family and patient to call should needs arise.   
Patient seen at bedside with nursing staff. Had sudden onset substernal chest pain at rest with crescendo and associated tachypnea. Received 2 doses of nitro with resolution of symptoms. VSS during event. EKG with new T wave inversion/ST depression. Cardiology consulted and d/w Dr Carlisle who recommended ASA 325mg and heparin gtt with trop x3 along with NPO after MN. Formal cardiology consult placed and nursing notified.    Ronda Carpenter PA-C  
Patient with copious secretions at this time, difficulty managing them. This RN notified Dr. BEENA Henry at 0443 via perfectesrve of patient's secretions and comfort care status. Dr. BEENA Henry gave orders for scopolamine patch.   
Physical Therapy Note:    Attempted to see patient this AM for physical therapy evaluation session. Patient on hold at this time per RN secondary to respiratory issues. Will follow and re-attempt as schedule permits/patient available. Thank you,    Jesus More, PT     Rehab Caseload Tracker   
Pt became agitated and pulling at his lines and telling his family \" I have to get up, I have to go, I have to pee!\" Family has requested that an end of life ma be placed. MD notified. Pt given morphine 4mg per order.   
Pt continues with comfort measures, family is at bedside no needs voiced from family at this time. Will continue to monitor.   
Pt in bed resting, his breathing is shallow with apnea moments noted. He has O2 3L NC in place for comfort. He remains on comfort measures and family is at bedside.   
Pt remains on comfort measures. He is resting at this time ma is in place. Family notifies staff when pt has a need. Pt is on room air now with apnea noted intermittently. Safety measures in place will continue to monitor.    
Pt transferred from ICU. Pt was moaning and attempting to talk when he arrived. He was grabbing at his chest and stating he could not breathe. Pt was placed on 3L NC and given Morphine 4mg per MD order. Family is at bedside pt is comfort measures. Pt does have wounds noted to bottom and BUE. Swelling noted to BUE and BLE. Bereavement cart ordered for family.   
Spoke with patients daughter, Stephanie Mealor, and informed of patient condition and change of rooms.   
TRANSFER - IN REPORT:    Verbal report received from Josephine WHITAKER on Jeronimo Ortiz Sr.  being received from ICU for routine progression of patient care      Report consisted of patient's Situation, Background, Assessment and   Recommendations(SBAR).     Information from the following report(s) Nurse Handoff Report was reviewed with the receiving nurse.    Opportunity for questions and clarification was provided.      Assessment completed upon patient's arrival to unit and care assumed.    
TRANSFER - IN REPORT:    Verbal report received from SHERMAN Pacheco on Jeronimo Moore Angel Sr.  being received from [place] ED for routine progression of patient care      Report consisted of patient's Situation, Background, Assessment and   Recommendations(SBAR).     Information from the following report(s) Nurse Handoff Report, ED SBAR, Adult Overview, MAR, Recent Results, and Event Log was reviewed with the receiving nurse.    Opportunity for questions and clarification was provided.      Assessment to be completed upon patient's arrival to unit and care assumed.     
TRANSFER - OUT REPORT:    Verbal report given to April RN on Jeronimo Ortiz Sr.  being transferred to [facility] for routine progression of patient care       Report consisted of patient's Situation, Background, Assessment and   Recommendations(SBAR).     Information from the following report(s) Nurse Handoff Report was reviewed with the receiving nurse.           Lines:   PICC 11/21/24 Left Brachial (Active)   Central Line Being Utilized Yes 11/22/24 0827   Criteria for Appropriate Use Long term IV/antibiotic administration 11/22/24 0827   Site Assessment Clean, dry & intact 11/22/24 0827   Phlebitis Assessment No symptoms 11/22/24 0827   Infiltration Assessment 0 11/21/24 2045   Extremity Circumference (cm) 27 cm 11/21/24 1205   External Catheter Length (cm) 0 cm 11/21/24 1205   Lumen #1 Color/Status Purple;Brisk blood return;Flushed;Alcohol cap applied 11/22/24 0827   Lumen #2 Color/Status Red;Brisk blood return;Flushed;Alcohol cap applied 11/22/24 0827   Line Care Connections checked and tightened 11/22/24 0827   Alcohol Cap Used Yes 11/22/24 0827   Date of Last Dressing Change 11/21/24 11/21/24 2045   Dressing Type Transparent w/CHG gel;Securing device 11/22/24 0827   Dressing Status Clean, dry & intact 11/22/24 0827   Dressing Intervention New 11/21/24 1205       Peripheral IV 12/11/24 Right Antecubital (Active)   Site Assessment Clean, dry & intact 12/12/24 0948   Line Status Flushed 12/12/24 0948   Line Care Connections checked and tightened;Cap changed 12/12/24 0948   Phlebitis Assessment No symptoms 12/12/24 0948   Infiltration Assessment 0 12/12/24 0948   Alcohol Cap Used Yes 12/12/24 0948   Dressing Status Clean, dry & intact 12/12/24 0948   Dressing Type Transparent 12/12/24 0948        Opportunity for questions and clarification was provided.      Patient transported with:  Registered Nurse       
TRANSFER - OUT REPORT:    Verbal report given to mikal cameron rn on Jeronimo Ortiz Sr.  being transferred to [facility] for change in patient condition (sob, tachycardia)       Report consisted of patient's Situation, Background, Assessment and   Recommendations(SBAR).     Information from the following report(s) Nurse Handoff Report, Index, ED Encounter Summary, ED SBAR, Intake/Output, MAR, and Recent Results was reviewed with the receiving nurse.           Lines:   PICC 11/21/24 Left Brachial (Active)   Central Line Being Utilized Yes 11/22/24 0827   Criteria for Appropriate Use Long term IV/antibiotic administration 11/22/24 0827   Site Assessment Clean, dry & intact 11/22/24 0827   Phlebitis Assessment No symptoms 11/22/24 0827   Infiltration Assessment 0 11/21/24 2045   Extremity Circumference (cm) 27 cm 11/21/24 1205   External Catheter Length (cm) 0 cm 11/21/24 1205   Lumen #1 Color/Status Purple;Brisk blood return;Flushed;Alcohol cap applied 11/22/24 0827   Lumen #2 Color/Status Red;Brisk blood return;Flushed;Alcohol cap applied 11/22/24 0827   Line Care Connections checked and tightened 11/22/24 0827   Alcohol Cap Used Yes 11/22/24 0827   Date of Last Dressing Change 11/21/24 11/21/24 2045   Dressing Type Transparent w/CHG gel;Securing device 11/22/24 0827   Dressing Status Clean, dry & intact 11/22/24 0827   Dressing Intervention New 11/21/24 1205       Peripheral IV 12/11/24 Right Antecubital (Active)   Site Assessment Clean, dry & intact 12/12/24 0948   Line Status Flushed 12/12/24 0948   Line Care Connections checked and tightened;Cap changed 12/12/24 0948   Phlebitis Assessment No symptoms 12/12/24 0948   Infiltration Assessment 0 12/12/24 0948   Alcohol Cap Used Yes 12/12/24 0948   Dressing Status Clean, dry & intact 12/12/24 0948   Dressing Type Transparent 12/12/24 0948        Opportunity for questions and clarification was provided.      Patient transported with:  Monitor and Registered 
Upon arrival to shift, patient noted to be very tachypneic and distressed on 100% Bipap. MD notified, see orders. Family notified of patient's acute decompensation and expressed desire that patient be made comfortable. Care team updated.  
VANCO DAILY NOTE  [facility]   Pharmacy Pharmacokinetic Monitoring Service - Vancomycin    Consulting Provider: Kyler   Indication: CAP  Target Concentration: Goal AUC/BAKARI 400-600 mg*hr/L  Day of Therapy: 1  Additional Antimicrobials: Zosyn    Pertinent Laboratory Values:   Wt Readings from Last 1 Encounters:   12/11/24 70.8 kg (156 lb)     Temp Readings from Last 1 Encounters:   12/11/24 97.6 °F (36.4 °C) (Axillary)     Recent Labs     12/11/24  1721 12/11/24  1859   BUN 13  --    CREATININE 0.58*  --    WBC 9.4  --    PROCAL 0.13  --    LACTSEPSIS  --  1.6     Estimated Creatinine Clearance: 98 mL/min (A) (based on SCr of 0.58 mg/dL (L)).    No results found for: \"VANCOTROUGH\", \"VANCORANDOM\"    MRSA Nasal Swab: Not ordered. Order placed by pharmacy    Assessment:  Date/Time Dose Concentration AUC         Note: Serum concentrations collected for AUC dosing may appear elevated if collected in close proximity to the dose administered, this is not necessarily an indication of toxicity    Plan:  Dosing recommendations based on Bayesian software  Start vancomycin 1500 mg x1, then 750 mg q 12 hr  Anticipated AUC of 418 and trough concentration of 12.7 at steady state  Renal labs as indicated   Vancomycin concentrations will be ordered as clinically appropriate  Pharmacy will continue to monitor patient and adjust therapy as indicated    Thank you for the consult,  Holly Thomas, PharmD, BCPS        
sodium chloride flush 0.9 % injection 5-40 mL  5-40 mL IntraVENous 2 times per day    sodium chloride flush 0.9 % injection 5-40 mL  5-40 mL IntraVENous PRN    0.9 % sodium chloride infusion   IntraVENous PRN    ondansetron (ZOFRAN-ODT) disintegrating tablet 4 mg  4 mg Oral Q8H PRN    Or    ondansetron (ZOFRAN) injection 4 mg  4 mg IntraVENous Q6H PRN    polyethylene glycol (GLYCOLAX) packet 17 g  17 g Oral Daily PRN    acetaminophen (TYLENOL) tablet 650 mg  650 mg Oral Q6H PRN    Or    acetaminophen (TYLENOL) suppository 650 mg  650 mg Rectal Q6H PRN    guaiFENesin-dextromethorphan (ROBITUSSIN DM) 100-10 MG/5ML syrup 5 mL  5 mL Oral Q4H PRN       Signed:  Christopher Saucedo MD    Part of this note may have been written by using a voice dictation software.  The note has been proof read but may still contain some grammatical/other typographical errors.    
mg SubLINGual Q5 Min PRN    metoprolol (LOPRESSOR) 5 MG/5ML injection        LORazepam (ATIVAN) 0.5 mg in sodium chloride (PF) 0.9 % 10 mL injection  0.5 mg IntraVENous Once    dexmedeTOMIDine (PRECEDEX) 400 mcg in sodium chloride 0.9 % 100 mL infusion  0.1-1.5 mcg/kg/hr IntraVENous Continuous    morphine sulfate (PF) injection 4 mg  4 mg IntraVENous Q3H PRN    LORazepam (ATIVAN) 1 mg in sodium chloride (PF) 0.9 % 10 mL injection  1 mg IntraVENous Q4H PRN    ipratropium 0.5 mg-albuterol 2.5 mg (DUONEB) nebulizer solution 1 Dose  1 Dose Inhalation Q4H PRN    hydrALAZINE (APRESOLINE) injection 10 mg  10 mg IntraVENous Q6H PRN    [Held by provider] enoxaparin (LOVENOX) injection 40 mg  40 mg SubCUTAneous Daily    sodium chloride flush 0.9 % injection 5-40 mL  5-40 mL IntraVENous 2 times per day    sodium chloride flush 0.9 % injection 5-40 mL  5-40 mL IntraVENous PRN    0.9 % sodium chloride infusion   IntraVENous PRN    ondansetron (ZOFRAN-ODT) disintegrating tablet 4 mg  4 mg Oral Q8H PRN    Or    ondansetron (ZOFRAN) injection 4 mg  4 mg IntraVENous Q6H PRN    polyethylene glycol (GLYCOLAX) packet 17 g  17 g Oral Daily PRN    acetaminophen (TYLENOL) tablet 650 mg  650 mg Oral Q6H PRN    Or    acetaminophen (TYLENOL) suppository 650 mg  650 mg Rectal Q6H PRN    guaiFENesin-dextromethorphan (ROBITUSSIN DM) 100-10 MG/5ML syrup 5 mL  5 mL Oral Q4H PRN       Signed:  Len Guzman MD    Part of this note may have been written by using a voice dictation software.  The note has been proof read but may still contain some grammatical/other typographical errors.

## 2024-12-16 NOTE — CARE COORDINATION
CM met with the patient's family. The family would like to take the patient home with [facility]. FANNY called [facility] and is waiting for a call back.

## 2024-12-16 NOTE — CARE COORDINATION
[facility] will meet with the family today to discuss discharging home with hospice care tomorrow.

## 2024-12-17 NOTE — DISCHARGE INSTRUCTIONS
Hospice.    *  Please give a list of your current medications to your Primary Care Provider.    *  Please update this list whenever your medications are discontinued, doses are      changed, or new medications (including over-the-counter products) are added.    *  Please carry medication information at all times in case of emergency situations.    These are general instructions for a healthy lifestyle:    No smoking/ No tobacco products/ Avoid exposure to second hand smoke  Surgeon General's Warning:  Quitting smoking now greatly reduces serious risk to your health.    Obesity, smoking, and sedentary lifestyle greatly increases your risk for illness    A healthy diet, regular physical exercise & weight monitoring are important for maintaining a healthy lifestyle    You may be retaining fluid if you have a history of heart failure or if you experience any of the following symptoms:  Weight gain of 3 pounds or more overnight or 5 pounds in a week, increased swelling in our hands or feet or shortness of breath while lying flat in bed.  Please call your doctor as soon as you notice any of these symptoms; do not wait until your next office visit.        The discharge information has been reviewed with the caregiver.  The caregiver verbalized understanding.  Discharge medications reviewed with the caregiver and appropriate educational materials and side effects teaching were provided.

## 2024-12-17 NOTE — PLAN OF CARE
Problem: Chronic Conditions and Co-morbidities  Goal: Patient's chronic conditions and co-morbidity symptoms are monitored and maintained or improved  Outcome: Progressing     Problem: Discharge Planning  Goal: Discharge to home or other facility with appropriate resources  12/17/2024 0139 by Bessy Gee, RN  Outcome: Progressing  12/16/2024 1517 by Francine Kaplan, RN  Outcome: Progressing     Problem: Safety - Adult  Goal: Free from fall injury  Outcome: Progressing     Problem: Skin/Tissue Integrity  Goal: Absence of new skin breakdown  Description: 1.  Monitor for areas of redness and/or skin breakdown  2.  Assess vascular access sites hourly  3.  Every 4-6 hours minimum:  Change oxygen saturation probe site  4.  Every 4-6 hours:  If on nasal continuous positive airway pressure, respiratory therapy assess nares and determine need for appliance change or resting period.  Outcome: Progressing     Problem: Respiratory - Adult  Goal: Achieves optimal ventilation and oxygenation  Outcome: Progressing     Problem: Pain  Goal: Verbalizes/displays adequate comfort level or baseline comfort level  Outcome: Progressing

## 2024-12-17 NOTE — DISCHARGE SUMMARY
Hospitalist Discharge Summary   Admit Date:  2024 10:37 PM   DC Note date: 2024  Name:  Jeronimo Ortiz Sr.   Age:  79 y.o.  Sex:  male  :  1945   MRN:  [id number]   Room:  [facility]  PCP:  Unknown, Provider, MD    Presenting Complaint: No chief complaint on file.     Initial Admission Diagnosis: HCAP (healthcare-associated pneumonia) [J18.9]     Problem List for this Hospitalization (present on admission):    Principal Problem:    COPD exacerbation (HCC)  Active Problems:    Severe aortic regurgitation    Chest pain    History of cerebrovascular accident    Infective endocarditis    Comfort measures only status    Hypokalemia    DNR (do not resuscitate)    Metabolic encephalopathy  Resolved Problems:    * No resolved hospital problems. *      Hospital Course:  Jeronimo Ortiz Sr. is a 79 y.o. 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 Admitted for COPD exacerbation in the setting of HCAP. Hospital course complicated by worsening respiratory failure and ICU stay. Discussion with family was held to pursue comfort care measures only and patient was transferred out of ICU .     Comfort care measures  -made DNR / comfort care  -as needed medications for respiratory distress or anxiety ordered  -ma placed for comfort   -family updated at bedside  DC home with home hospice care       COPD exacerbation (HCC)  -inhalers PRN for comfort only  -nasal cannula O2 for comfort       History of cerebrovascular accident  -noted  -comfort measures only       Infective endocarditis  -noted  -stopped antibiotics as comfort care only       DNR (do not resuscitate)  -noted        Anticipated Discharge Arrangements:   Home with

## 2024-12-17 NOTE — CARE COORDINATION
12/17/24 1245   Services At/After Discharge   Transition of Care Consult (CM Consult) Hospice   Internal Hospice No   Reason Outside Agency Chosen Patient already serviced by other home care/hospice agency   Services At/After Discharge Hospice   [place] Resource Information Provided? No   Mode of Transport at Discharge BLS   Confirm Follow Up Transport   (family)   Condition of Participation: Discharge Planning   The Plan for Transition of Care is related to the following treatment goals: home with hospice   The Patient and/or Patient Representative was provided with a Choice of Provider? Patient   The Patient and/Or Patient Representative agree with the Discharge Plan? Yes   Freedom of Choice list was provided with basic dialogue that supports the patient's individualized plan of care/goals, treatment preferences, and shares the quality data associated with the providers?  Yes     Patient is discharging via [facility] with [facility]. Family at bedside.